# Patient Record
Sex: FEMALE | Race: WHITE | NOT HISPANIC OR LATINO | Employment: OTHER | URBAN - METROPOLITAN AREA
[De-identification: names, ages, dates, MRNs, and addresses within clinical notes are randomized per-mention and may not be internally consistent; named-entity substitution may affect disease eponyms.]

---

## 2024-08-04 ENCOUNTER — NURSE TRIAGE (OUTPATIENT)
Dept: ADMINISTRATIVE | Facility: CLINIC | Age: 79
End: 2024-08-04
Payer: MEDICARE

## 2024-08-04 ENCOUNTER — TELEPHONE (OUTPATIENT)
Dept: URGENT CARE | Facility: CLINIC | Age: 79
End: 2024-08-04
Payer: MEDICARE

## 2024-08-04 ENCOUNTER — OFFICE VISIT (OUTPATIENT)
Dept: URGENT CARE | Facility: CLINIC | Age: 79
End: 2024-08-04
Payer: MEDICARE

## 2024-08-04 ENCOUNTER — HOSPITAL ENCOUNTER (INPATIENT)
Facility: HOSPITAL | Age: 79
LOS: 2 days | Discharge: HOME | DRG: 177 | End: 2024-08-06
Attending: EMERGENCY MEDICINE | Admitting: INTERNAL MEDICINE
Payer: MEDICARE

## 2024-08-04 VITALS
OXYGEN SATURATION: 91 % | BODY MASS INDEX: 17.31 KG/M2 | DIASTOLIC BLOOD PRESSURE: 66 MMHG | HEART RATE: 93 BPM | WEIGHT: 116.88 LBS | SYSTOLIC BLOOD PRESSURE: 120 MMHG | HEIGHT: 69 IN | TEMPERATURE: 99 F | RESPIRATION RATE: 18 BRPM

## 2024-08-04 DIAGNOSIS — R07.9 CHEST PAIN: ICD-10-CM

## 2024-08-04 DIAGNOSIS — U07.1 COVID-19 VIRUS DETECTED: ICD-10-CM

## 2024-08-04 DIAGNOSIS — R79.89 ELEVATED TROPONIN: ICD-10-CM

## 2024-08-04 DIAGNOSIS — R79.89 ELEVATED TROPONIN I LEVEL: ICD-10-CM

## 2024-08-04 DIAGNOSIS — R09.02 HYPOXIA: ICD-10-CM

## 2024-08-04 DIAGNOSIS — R06.2 WHEEZING: ICD-10-CM

## 2024-08-04 DIAGNOSIS — J06.9 URI WITH COUGH AND CONGESTION: ICD-10-CM

## 2024-08-04 DIAGNOSIS — E11.9 DIABETES MELLITUS TYPE 2, NONINSULIN DEPENDENT: ICD-10-CM

## 2024-08-04 DIAGNOSIS — R93.89 ABNORMAL CT OF THE CHEST: ICD-10-CM

## 2024-08-04 DIAGNOSIS — I10 PRIMARY HYPERTENSION: ICD-10-CM

## 2024-08-04 DIAGNOSIS — U07.1 COVID-19: Primary | ICD-10-CM

## 2024-08-04 PROBLEM — E03.9 HYPOTHYROIDISM: Status: ACTIVE | Noted: 2024-08-04

## 2024-08-04 PROBLEM — E87.1 HYPONATREMIA: Status: ACTIVE | Noted: 2024-08-04

## 2024-08-04 PROBLEM — E78.5 HYPERLIPIDEMIA: Status: ACTIVE | Noted: 2024-08-04

## 2024-08-04 LAB
ALBUMIN SERPL BCP-MCNC: 3.5 G/DL (ref 3.5–5.2)
ALP SERPL-CCNC: 73 U/L (ref 55–135)
ALT SERPL W/O P-5'-P-CCNC: 32 U/L (ref 10–44)
ANION GAP SERPL CALC-SCNC: 12 MMOL/L (ref 8–16)
AST SERPL-CCNC: 37 U/L (ref 10–40)
BASOPHILS # BLD AUTO: 0.01 K/UL (ref 0–0.2)
BASOPHILS NFR BLD: 0.2 % (ref 0–1.9)
BILIRUB SERPL-MCNC: 1.2 MG/DL (ref 0.1–1)
BILIRUB UR QL STRIP: NEGATIVE
BNP SERPL-MCNC: 39 PG/ML (ref 0–99)
BUN SERPL-MCNC: 12 MG/DL (ref 8–23)
CALCIUM SERPL-MCNC: 8.5 MG/DL (ref 8.7–10.5)
CHLORIDE SERPL-SCNC: 93 MMOL/L (ref 95–110)
CLARITY UR: CLEAR
CO2 SERPL-SCNC: 21 MMOL/L (ref 23–29)
COLOR UR: COLORLESS
CREAT SERPL-MCNC: 0.8 MG/DL (ref 0.5–1.4)
CTP QC/QA: YES
DIFFERENTIAL METHOD BLD: ABNORMAL
EOSINOPHIL # BLD AUTO: 0 K/UL (ref 0–0.5)
EOSINOPHIL NFR BLD: 0.2 % (ref 0–8)
ERYTHROCYTE [DISTWIDTH] IN BLOOD BY AUTOMATED COUNT: 12.3 % (ref 11.5–14.5)
EST. GFR  (NO RACE VARIABLE): >60 ML/MIN/1.73 M^2
GLUCOSE SERPL-MCNC: 156 MG/DL (ref 70–110)
GLUCOSE UR QL STRIP: NEGATIVE
HCT VFR BLD AUTO: 38.5 % (ref 37–48.5)
HGB BLD-MCNC: 13.2 G/DL (ref 12–16)
HGB UR QL STRIP: NEGATIVE
IMM GRANULOCYTES # BLD AUTO: 0.04 K/UL (ref 0–0.04)
IMM GRANULOCYTES NFR BLD AUTO: 0.7 % (ref 0–0.5)
KETONES UR QL STRIP: ABNORMAL
LACTATE SERPL-SCNC: 0.9 MMOL/L (ref 0.5–2.2)
LACTATE SERPL-SCNC: 1 MMOL/L (ref 0.5–2.2)
LEUKOCYTE ESTERASE UR QL STRIP: NEGATIVE
LYMPHOCYTES # BLD AUTO: 0.1 K/UL (ref 1–4.8)
LYMPHOCYTES NFR BLD: 2.2 % (ref 18–48)
MCH RBC QN AUTO: 31.2 PG (ref 27–31)
MCHC RBC AUTO-ENTMCNC: 34.3 G/DL (ref 32–36)
MCV RBC AUTO: 91 FL (ref 82–98)
MONOCYTES # BLD AUTO: 0 K/UL (ref 0.3–1)
MONOCYTES NFR BLD: 0.4 % (ref 4–15)
NEUTROPHILS # BLD AUTO: 5.4 K/UL (ref 1.8–7.7)
NEUTROPHILS NFR BLD: 96.3 % (ref 38–73)
NITRITE UR QL STRIP: NEGATIVE
NRBC BLD-RTO: 0 /100 WBC
PH UR STRIP: 7 [PH] (ref 5–8)
PLATELET # BLD AUTO: 204 K/UL (ref 150–450)
PMV BLD AUTO: 9.8 FL (ref 9.2–12.9)
POTASSIUM SERPL-SCNC: 4.1 MMOL/L (ref 3.5–5.1)
PROT SERPL-MCNC: 6.9 G/DL (ref 6–8.4)
PROT UR QL STRIP: NEGATIVE
RBC # BLD AUTO: 4.23 M/UL (ref 4–5.4)
SARS-COV-2 AG RESP QL IA.RAPID: POSITIVE
SODIUM SERPL-SCNC: 126 MMOL/L (ref 136–145)
SP GR UR STRIP: 1.03 (ref 1–1.03)
TROPONIN I SERPL DL<=0.01 NG/ML-MCNC: 1.52 NG/ML (ref 0–0.03)
URN SPEC COLLECT METH UR: ABNORMAL
UROBILINOGEN UR STRIP-ACNC: NEGATIVE EU/DL
WBC # BLD AUTO: 5.55 K/UL (ref 3.9–12.7)

## 2024-08-04 PROCEDURE — 99214 OFFICE O/P EST MOD 30 MIN: CPT | Mod: ,,, | Performed by: NURSE PRACTITIONER

## 2024-08-04 PROCEDURE — 87040 BLOOD CULTURE FOR BACTERIA: CPT | Mod: 59 | Performed by: NURSE PRACTITIONER

## 2024-08-04 PROCEDURE — 87811 SARS-COV-2 COVID19 W/OPTIC: CPT | Mod: QW,,, | Performed by: NURSE PRACTITIONER

## 2024-08-04 PROCEDURE — 36415 COLL VENOUS BLD VENIPUNCTURE: CPT | Performed by: INTERNAL MEDICINE

## 2024-08-04 PROCEDURE — 21400001 HC TELEMETRY ROOM

## 2024-08-04 PROCEDURE — 81003 URINALYSIS AUTO W/O SCOPE: CPT | Performed by: NURSE PRACTITIONER

## 2024-08-04 PROCEDURE — 71046 X-RAY EXAM CHEST 2 VIEWS: CPT | Mod: S$GLB,,, | Performed by: RADIOLOGY

## 2024-08-04 PROCEDURE — 87449 NOS EACH ORGANISM AG IA: CPT | Performed by: INTERNAL MEDICINE

## 2024-08-04 PROCEDURE — 84145 PROCALCITONIN (PCT): CPT | Performed by: INTERNAL MEDICINE

## 2024-08-04 PROCEDURE — 80053 COMPREHEN METABOLIC PANEL: CPT | Performed by: NURSE PRACTITIONER

## 2024-08-04 PROCEDURE — 99285 EMERGENCY DEPT VISIT HI MDM: CPT | Mod: 25

## 2024-08-04 PROCEDURE — 93010 ELECTROCARDIOGRAM REPORT: CPT | Mod: ,,, | Performed by: INTERNAL MEDICINE

## 2024-08-04 PROCEDURE — 83605 ASSAY OF LACTIC ACID: CPT | Mod: 91 | Performed by: NURSE PRACTITIONER

## 2024-08-04 PROCEDURE — 63600175 PHARM REV CODE 636 W HCPCS: Performed by: INTERNAL MEDICINE

## 2024-08-04 PROCEDURE — 84484 ASSAY OF TROPONIN QUANT: CPT | Mod: 91 | Performed by: NURSE PRACTITIONER

## 2024-08-04 PROCEDURE — 83880 ASSAY OF NATRIURETIC PEPTIDE: CPT | Performed by: NURSE PRACTITIONER

## 2024-08-04 PROCEDURE — 93005 ELECTROCARDIOGRAM TRACING: CPT

## 2024-08-04 PROCEDURE — 27000207 HC ISOLATION

## 2024-08-04 PROCEDURE — 25000003 PHARM REV CODE 250: Performed by: INTERNAL MEDICINE

## 2024-08-04 PROCEDURE — 25500020 PHARM REV CODE 255: Performed by: EMERGENCY MEDICINE

## 2024-08-04 PROCEDURE — 85025 COMPLETE CBC W/AUTO DIFF WBC: CPT | Performed by: NURSE PRACTITIONER

## 2024-08-04 PROCEDURE — 84484 ASSAY OF TROPONIN QUANT: CPT | Performed by: INTERNAL MEDICINE

## 2024-08-04 RX ORDER — ASPIRIN 325 MG
325 TABLET ORAL ONCE
Status: COMPLETED | OUTPATIENT
Start: 2024-08-04 | End: 2024-08-04

## 2024-08-04 RX ORDER — ALBUTEROL SULFATE 90 UG/1
2 INHALANT RESPIRATORY (INHALATION) EVERY 6 HOURS PRN
Qty: 18 G | Refills: 1 | Status: ON HOLD | OUTPATIENT
Start: 2024-08-04 | End: 2024-08-06 | Stop reason: HOSPADM

## 2024-08-04 RX ORDER — NIRMATRELVIR AND RITONAVIR 300-100 MG
KIT ORAL
Qty: 30 TABLET | Refills: 0 | Status: ON HOLD | OUTPATIENT
Start: 2024-08-04 | End: 2024-08-06 | Stop reason: HOSPADM

## 2024-08-04 RX ORDER — PANTOPRAZOLE SODIUM 40 MG/1
40 TABLET, DELAYED RELEASE ORAL DAILY
Status: DISCONTINUED | OUTPATIENT
Start: 2024-08-05 | End: 2024-08-06 | Stop reason: HOSPADM

## 2024-08-04 RX ORDER — LEVOTHYROXINE SODIUM 125 UG/1
125 TABLET ORAL
Status: DISCONTINUED | OUTPATIENT
Start: 2024-08-05 | End: 2024-08-06 | Stop reason: HOSPADM

## 2024-08-04 RX ORDER — HYDRALAZINE HYDROCHLORIDE 20 MG/ML
10 INJECTION INTRAMUSCULAR; INTRAVENOUS EVERY 6 HOURS PRN
Status: DISCONTINUED | OUTPATIENT
Start: 2024-08-04 | End: 2024-08-06 | Stop reason: HOSPADM

## 2024-08-04 RX ORDER — ALBUTEROL SULFATE 90 UG/1
2 INHALANT RESPIRATORY (INHALATION) EVERY 6 HOURS PRN
Status: DISCONTINUED | OUTPATIENT
Start: 2024-08-04 | End: 2024-08-06 | Stop reason: HOSPADM

## 2024-08-04 RX ORDER — PRAVASTATIN SODIUM 20 MG/1
40 TABLET ORAL NIGHTLY
Status: DISCONTINUED | OUTPATIENT
Start: 2024-08-05 | End: 2024-08-06 | Stop reason: HOSPADM

## 2024-08-04 RX ORDER — ACETAMINOPHEN 325 MG/1
650 TABLET ORAL EVERY 6 HOURS PRN
Status: DISCONTINUED | OUTPATIENT
Start: 2024-08-04 | End: 2024-08-06 | Stop reason: HOSPADM

## 2024-08-04 RX ORDER — SODIUM CHLORIDE 9 MG/ML
INJECTION, SOLUTION INTRAVENOUS CONTINUOUS
Status: DISCONTINUED | OUTPATIENT
Start: 2024-08-04 | End: 2024-08-05

## 2024-08-04 RX ORDER — GLUCAGON 1 MG
1 KIT INJECTION
Status: DISCONTINUED | OUTPATIENT
Start: 2024-08-04 | End: 2024-08-06 | Stop reason: HOSPADM

## 2024-08-04 RX ORDER — ENOXAPARIN SODIUM 100 MG/ML
40 INJECTION SUBCUTANEOUS EVERY 12 HOURS
Status: DISCONTINUED | OUTPATIENT
Start: 2024-08-04 | End: 2024-08-06 | Stop reason: HOSPADM

## 2024-08-04 RX ORDER — IBUPROFEN 200 MG
16 TABLET ORAL
Status: DISCONTINUED | OUTPATIENT
Start: 2024-08-04 | End: 2024-08-06 | Stop reason: HOSPADM

## 2024-08-04 RX ORDER — INSULIN ASPART 100 [IU]/ML
0-5 INJECTION, SOLUTION INTRAVENOUS; SUBCUTANEOUS
Status: DISCONTINUED | OUTPATIENT
Start: 2024-08-04 | End: 2024-08-06 | Stop reason: HOSPADM

## 2024-08-04 RX ORDER — ONDANSETRON HYDROCHLORIDE 2 MG/ML
4 INJECTION, SOLUTION INTRAVENOUS EVERY 6 HOURS PRN
Status: DISCONTINUED | OUTPATIENT
Start: 2024-08-04 | End: 2024-08-06 | Stop reason: HOSPADM

## 2024-08-04 RX ORDER — SODIUM CHLORIDE 0.9 % (FLUSH) 0.9 %
10 SYRINGE (ML) INJECTION EVERY 12 HOURS PRN
Status: DISCONTINUED | OUTPATIENT
Start: 2024-08-04 | End: 2024-08-06 | Stop reason: HOSPADM

## 2024-08-04 RX ORDER — NALOXONE HCL 0.4 MG/ML
0.02 VIAL (ML) INJECTION
Status: DISCONTINUED | OUTPATIENT
Start: 2024-08-04 | End: 2024-08-06 | Stop reason: HOSPADM

## 2024-08-04 RX ORDER — IBUPROFEN 200 MG
24 TABLET ORAL
Status: DISCONTINUED | OUTPATIENT
Start: 2024-08-04 | End: 2024-08-06 | Stop reason: HOSPADM

## 2024-08-04 RX ADMIN — IOHEXOL 100 ML: 350 INJECTION, SOLUTION INTRAVENOUS at 08:08

## 2024-08-04 RX ADMIN — ASPIRIN 325 MG ORAL TABLET 325 MG: 325 PILL ORAL at 11:08

## 2024-08-04 RX ADMIN — DEXAMETHASONE 6 MG: 4 TABLET ORAL at 11:08

## 2024-08-04 RX ADMIN — ENOXAPARIN SODIUM 40 MG: 40 INJECTION SUBCUTANEOUS at 11:08

## 2024-08-04 RX ADMIN — SODIUM CHLORIDE: 9 INJECTION, SOLUTION INTRAVENOUS at 11:08

## 2024-08-04 NOTE — FIRST PROVIDER EVALUATION
"Medical screening examination initiated.  I have conducted a focused provider triage encounter, findings are as follows:    Brief history of present illness:  Patient presents to ER for COVID-19 concerns.  States she was evaluated at urgent care today diagnosed with COVID-19.  Was instructed to come in the ER due to low SpO2.    Vitals:    08/04/24 1830   BP: 122/62   BP Location: Left arm   Patient Position: Sitting   Pulse: 96   Resp: 20   Temp: 99.3 °F (37.4 °C)   SpO2: (!) 92%   Weight: 54 kg (119 lb 0.8 oz)   Height: 5' 8.9" (1.75 m)       Pertinent physical exam:  No acute distress.  SpO2 92% on room air.    Brief workup plan:  Workup. ED bed for further eval/treatment.      Preliminary workup initiated; this workup will be continued and followed by the physician or advanced practice provider that is assigned to the patient when roomed.  "

## 2024-08-04 NOTE — Clinical Note
Diagnosis: COVID-19 [[id number]]   Future Attending Provider: AUGUSTUS MADDOX [[id number]]   Reason for IP Medical Treatment  (Clinical interventions that can only be accomplished in the IP setting? ) :: Elevated troponin   I certify that Inpatient services for greater than or equal to 2 midnights are medically necessary:: No   Plans for Post-Acute care--if anticipated (pick the single best option):: A. No post acute care anticipated at this time   Special Needs:: No Special Needs [1]

## 2024-08-05 PROBLEM — J98.19 RIGHT MIDDLE LOBE SYNDROME: Status: ACTIVE | Noted: 2024-08-05

## 2024-08-05 PROBLEM — J47.9 ADULT BRONCHIECTASIS: Status: ACTIVE | Noted: 2024-08-05

## 2024-08-05 LAB
ALBUMIN SERPL BCP-MCNC: 3 G/DL (ref 3.5–5.2)
ALP SERPL-CCNC: 69 U/L (ref 55–135)
ALT SERPL W/O P-5'-P-CCNC: 36 U/L (ref 10–44)
ANION GAP SERPL CALC-SCNC: 11 MMOL/L (ref 8–16)
AORTIC ROOT ANNULUS: 2.62 CM
ASCENDING AORTA: 2.98 CM
AST SERPL-CCNC: 40 U/L (ref 10–40)
AV INDEX (PROSTH): 0.83
AV MEAN GRADIENT: 3 MMHG
AV PEAK GRADIENT: 6 MMHG
AV VALVE AREA BY VELOCITY RATIO: 2.55 CM²
AV VALVE AREA: 2.46 CM²
AV VELOCITY RATIO: 0.86
BASOPHILS # BLD AUTO: 0.01 K/UL (ref 0–0.2)
BASOPHILS NFR BLD: 0.4 % (ref 0–1.9)
BILIRUB SERPL-MCNC: 0.9 MG/DL (ref 0.1–1)
BSA FOR ECHO PROCEDURE: 1.6 M2
BUN SERPL-MCNC: 9 MG/DL (ref 8–23)
CALCIUM SERPL-MCNC: 8 MG/DL (ref 8.7–10.5)
CHLORIDE SERPL-SCNC: 94 MMOL/L (ref 95–110)
CO2 SERPL-SCNC: 22 MMOL/L (ref 23–29)
CREAT SERPL-MCNC: 0.8 MG/DL (ref 0.5–1.4)
CV ECHO LV RWT: 0.49 CM
DIFFERENTIAL METHOD BLD: ABNORMAL
DOP CALC AO PEAK VEL: 1.18 M/S
DOP CALC AO VTI: 26.3 CM
DOP CALC LVOT AREA: 3 CM2
DOP CALC LVOT DIAMETER: 1.94 CM
DOP CALC LVOT PEAK VEL: 1.02 M/S
DOP CALC LVOT STROKE VOLUME: 64.7 CM3
DOP CALC RVOT PEAK VEL: 0.52 M/S
DOP CALC RVOT VTI: 12.7 CM
DOP CALCLVOT PEAK VEL VTI: 21.9 CM
E WAVE DECELERATION TIME: 171.45 MSEC
E/A RATIO: 0.98
E/E' RATIO: 6.56 M/S
ECHO LV POSTERIOR WALL: 0.98 CM (ref 0.6–1.1)
EJECTION FRACTION: 55 %
EOSINOPHIL # BLD AUTO: 0 K/UL (ref 0–0.5)
EOSINOPHIL NFR BLD: 0 % (ref 0–8)
ERYTHROCYTE [DISTWIDTH] IN BLOOD BY AUTOMATED COUNT: 12.5 % (ref 11.5–14.5)
EST. GFR  (NO RACE VARIABLE): >60 ML/MIN/1.73 M^2
FRACTIONAL SHORTENING: 36 % (ref 28–44)
GLUCOSE SERPL-MCNC: 296 MG/DL (ref 70–110)
HCT VFR BLD AUTO: 36.4 % (ref 37–48.5)
HGB BLD-MCNC: 12.3 G/DL (ref 12–16)
IMM GRANULOCYTES # BLD AUTO: 0.01 K/UL (ref 0–0.04)
IMM GRANULOCYTES NFR BLD AUTO: 0.4 % (ref 0–0.5)
INTERVENTRICULAR SEPTUM: 0.87 CM (ref 0.6–1.1)
IVC DIAMETER: 1.57 CM
IVRT: 91.34 MSEC
LA MAJOR: 5.18 CM
LA MINOR: 2.83 CM
LA WIDTH: 2.5 CM
LEFT ATRIUM SIZE: 2.49 CM
LEFT ATRIUM VOLUME INDEX: 11.8 ML/M2
LEFT ATRIUM VOLUME: 19.37 CM3
LEFT INTERNAL DIMENSION IN SYSTOLE: 2.58 CM (ref 2.1–4)
LEFT VENTRICLE DIASTOLIC VOLUME INDEX: 43.19 ML/M2
LEFT VENTRICLE DIASTOLIC VOLUME: 70.83 ML
LEFT VENTRICLE MASS INDEX: 70 G/M2
LEFT VENTRICLE SYSTOLIC VOLUME INDEX: 14.7 ML/M2
LEFT VENTRICLE SYSTOLIC VOLUME: 24.06 ML
LEFT VENTRICULAR INTERNAL DIMENSION IN DIASTOLE: 4.02 CM (ref 3.5–6)
LEFT VENTRICULAR MASS: 114.83 G
LV LATERAL E/E' RATIO: 5.86 M/S
LV SEPTAL E/E' RATIO: 7.45 M/S
LVED V (TEICH): 70.83 ML
LVES V (TEICH): 24.06 ML
LVOT MG: 2.21 MMHG
LVOT MV: 0.7 CM/S
LYMPHOCYTES # BLD AUTO: 0.2 K/UL (ref 1–4.8)
LYMPHOCYTES NFR BLD: 5.9 % (ref 18–48)
MCH RBC QN AUTO: 31.4 PG (ref 27–31)
MCHC RBC AUTO-ENTMCNC: 33.8 G/DL (ref 32–36)
MCV RBC AUTO: 93 FL (ref 82–98)
MONOCYTES # BLD AUTO: 0.1 K/UL (ref 0.3–1)
MONOCYTES NFR BLD: 2.7 % (ref 4–15)
MV PEAK A VEL: 0.84 M/S
MV PEAK E VEL: 0.82 M/S
NEUTROPHILS # BLD AUTO: 2.3 K/UL (ref 1.8–7.7)
NEUTROPHILS NFR BLD: 90.6 % (ref 38–73)
NRBC BLD-RTO: 0 /100 WBC
OHS QRS DURATION: 88 MS
OHS QTC CALCULATION: 450 MS
PLATELET # BLD AUTO: 209 K/UL (ref 150–450)
PMV BLD AUTO: 10.1 FL (ref 9.2–12.9)
POCT GLUCOSE: 326 MG/DL (ref 70–110)
POTASSIUM SERPL-SCNC: 4.8 MMOL/L (ref 3.5–5.1)
PROCALCITONIN SERPL IA-MCNC: 14.21 NG/ML
PROT SERPL-MCNC: 5.6 G/DL (ref 6–8.4)
PV MEAN GRADIENT: 1 MMHG
RA MAJOR: 3.68 CM
RA PRESSURE ESTIMATED: 3 MMHG
RA WIDTH: 3.8 CM
RBC # BLD AUTO: 3.92 M/UL (ref 4–5.4)
SODIUM SERPL-SCNC: 127 MMOL/L (ref 136–145)
STJ: 2.78 CM
TDI LATERAL: 0.14 M/S
TDI SEPTAL: 0.11 M/S
TDI: 0.13 M/S
TRICUSPID ANNULAR PLANE SYSTOLIC EXCURSION: 2.04 CM
TROPONIN I SERPL DL<=0.01 NG/ML-MCNC: 0.73 NG/ML (ref 0–0.03)
TROPONIN I SERPL DL<=0.01 NG/ML-MCNC: 1.24 NG/ML (ref 0–0.03)
WBC # BLD AUTO: 2.56 K/UL (ref 3.9–12.7)
Z-SCORE OF LEFT VENTRICULAR DIMENSION IN END DIASTOLE: -1.38
Z-SCORE OF LEFT VENTRICULAR DIMENSION IN END SYSTOLE: -0.82

## 2024-08-05 PROCEDURE — 25000003 PHARM REV CODE 250: Performed by: INTERNAL MEDICINE

## 2024-08-05 PROCEDURE — 36415 COLL VENOUS BLD VENIPUNCTURE: CPT | Performed by: INTERNAL MEDICINE

## 2024-08-05 PROCEDURE — 99222 1ST HOSP IP/OBS MODERATE 55: CPT | Mod: ,,, | Performed by: INTERNAL MEDICINE

## 2024-08-05 PROCEDURE — 63600175 PHARM REV CODE 636 W HCPCS: Performed by: INTERNAL MEDICINE

## 2024-08-05 PROCEDURE — 86606 ASPERGILLUS ANTIBODY: CPT | Performed by: NURSE PRACTITIONER

## 2024-08-05 PROCEDURE — 27000207 HC ISOLATION

## 2024-08-05 PROCEDURE — 94799 UNLISTED PULMONARY SVC/PX: CPT | Mod: XB

## 2024-08-05 PROCEDURE — 85025 COMPLETE CBC W/AUTO DIFF WBC: CPT | Performed by: INTERNAL MEDICINE

## 2024-08-05 PROCEDURE — 21400001 HC TELEMETRY ROOM

## 2024-08-05 PROCEDURE — 99900035 HC TECH TIME PER 15 MIN (STAT)

## 2024-08-05 PROCEDURE — 80053 COMPREHEN METABOLIC PANEL: CPT | Performed by: INTERNAL MEDICINE

## 2024-08-05 PROCEDURE — 84484 ASSAY OF TROPONIN QUANT: CPT | Performed by: INTERNAL MEDICINE

## 2024-08-05 PROCEDURE — 63600175 PHARM REV CODE 636 W HCPCS: Performed by: EMERGENCY MEDICINE

## 2024-08-05 PROCEDURE — 25000003 PHARM REV CODE 250: Performed by: EMERGENCY MEDICINE

## 2024-08-05 PROCEDURE — 86698 HISTOPLASMA ANTIBODY: CPT | Performed by: NURSE PRACTITIONER

## 2024-08-05 PROCEDURE — 99223 1ST HOSP IP/OBS HIGH 75: CPT | Mod: ,,, | Performed by: INTERNAL MEDICINE

## 2024-08-05 PROCEDURE — 86612 BLASTOMYCES ANTIBODY: CPT | Performed by: NURSE PRACTITIONER

## 2024-08-05 PROCEDURE — 36415 COLL VENOUS BLD VENIPUNCTURE: CPT | Performed by: NURSE PRACTITIONER

## 2024-08-05 RX ORDER — FUROSEMIDE 10 MG/ML
20 INJECTION INTRAMUSCULAR; INTRAVENOUS DAILY
Status: DISCONTINUED | OUTPATIENT
Start: 2024-08-05 | End: 2024-08-06 | Stop reason: HOSPADM

## 2024-08-05 RX ORDER — CYANOCOBALAMIN 1000 UG/ML
1000 INJECTION, SOLUTION INTRAMUSCULAR; SUBCUTANEOUS DAILY
Status: COMPLETED | OUTPATIENT
Start: 2024-08-05 | End: 2024-08-06

## 2024-08-05 RX ORDER — THIAMINE HCL 100 MG
100 TABLET ORAL DAILY
Status: DISCONTINUED | OUTPATIENT
Start: 2024-08-05 | End: 2024-08-06 | Stop reason: HOSPADM

## 2024-08-05 RX ORDER — SODIUM CHLORIDE 9 MG/ML
INJECTION, SOLUTION INTRAVENOUS CONTINUOUS
Status: DISCONTINUED | OUTPATIENT
Start: 2024-08-05 | End: 2024-08-05

## 2024-08-05 RX ADMIN — SODIUM CHLORIDE: 9 INJECTION, SOLUTION INTRAVENOUS at 09:08

## 2024-08-05 RX ADMIN — AZITHROMYCIN MONOHYDRATE 500 MG: 500 INJECTION, POWDER, LYOPHILIZED, FOR SOLUTION INTRAVENOUS at 01:08

## 2024-08-05 RX ADMIN — Medication 100 MG: at 03:08

## 2024-08-05 RX ADMIN — Medication 1 TABLET: at 03:08

## 2024-08-05 RX ADMIN — INSULIN ASPART 2 UNITS: 100 INJECTION, SOLUTION INTRAVENOUS; SUBCUTANEOUS at 09:08

## 2024-08-05 RX ADMIN — LEVOTHYROXINE SODIUM 125 MCG: 125 TABLET ORAL at 05:08

## 2024-08-05 RX ADMIN — CYANOCOBALAMIN 1000 MCG: 1000 INJECTION INTRAMUSCULAR; SUBCUTANEOUS at 03:08

## 2024-08-05 RX ADMIN — CEFTRIAXONE SODIUM 2 G: 2 INJECTION, POWDER, FOR SOLUTION INTRAMUSCULAR; INTRAVENOUS at 02:08

## 2024-08-05 RX ADMIN — DEXAMETHASONE 6 MG: 4 TABLET ORAL at 10:08

## 2024-08-05 RX ADMIN — THERA TABS 1 TABLET: TAB at 03:08

## 2024-08-05 RX ADMIN — ENOXAPARIN SODIUM 40 MG: 40 INJECTION SUBCUTANEOUS at 09:08

## 2024-08-05 RX ADMIN — PANTOPRAZOLE SODIUM 40 MG: 40 TABLET, DELAYED RELEASE ORAL at 10:08

## 2024-08-05 RX ADMIN — ENOXAPARIN SODIUM 40 MG: 40 INJECTION SUBCUTANEOUS at 10:08

## 2024-08-05 RX ADMIN — FUROSEMIDE 20 MG: 10 INJECTION, SOLUTION INTRAMUSCULAR; INTRAVENOUS at 03:08

## 2024-08-05 NOTE — SUBJECTIVE & OBJECTIVE
Past Medical History:   Diagnosis Date    Diabetes mellitus     Hypercholesteremia     Hypertension     Hypothyroidism     Osteopenia     Endocrinologist treating       Past Surgical History:   Procedure Laterality Date    BREAST BIOPSY      BREAST CYST ASPIRATION      COLONOSCOPY  2016    BR NL    ENDOSCOPY         Review of patient's allergies indicates:   Allergen Reactions    Adhesive tape-silicones     Latex, natural rubber        Family History       Problem Relation (Age of Onset)    Colon cancer Father (86)    Hodgkin's lymphoma Father          Tobacco Use    Smoking status: Never    Smokeless tobacco: Never   Substance and Sexual Activity    Alcohol use: Never    Drug use: Never    Sexual activity: Not Currently     Partners: Male     Birth control/protection: None         Review of Systems   Constitutional:  Negative for fatigue.   Respiratory:  Negative for cough, shortness of breath and stridor.      Objective:     Vital Signs (Most Recent):  Temp: 98.3 °F (36.8 °C) (08/05/24 1144)  Pulse: 75 (08/05/24 1144)  Resp: 18 (08/05/24 1144)  BP: 121/71 (08/05/24 1144)  SpO2: 95 % (08/05/24 1144) Vital Signs (24h Range):  Temp:  [97.5 °F (36.4 °C)-99.3 °F (37.4 °C)] 98.3 °F (36.8 °C)  Pulse:  [66-96] 75  Resp:  [17-30] 18  SpO2:  [92 %-96 %] 95 %  BP: (108-146)/(62-72) 121/71     Weight: 52.6 kg (116 lb)  Body mass index is 17.13 kg/m².    No intake or output data in the 24 hours ending 08/05/24 1333     Physical Exam  Vitals and nursing note reviewed.   Constitutional:       Appearance: She is well-developed.   HENT:      Head: Normocephalic and atraumatic.      Nose: Nose normal.      Mouth/Throat:      Pharynx: No oropharyngeal exudate.   Eyes:      General: No scleral icterus.     Conjunctiva/sclera: Conjunctivae normal.      Pupils: Pupils are equal, round, and reactive to light.   Neck:      Thyroid: No thyromegaly.      Vascular: No JVD.      Trachea: No tracheal deviation.   Cardiovascular:      Rate  "and Rhythm: Normal rate and regular rhythm.      Heart sounds: Normal heart sounds, S1 normal and S2 normal. No murmur heard.  Pulmonary:      Effort: Pulmonary effort is normal. No tachypnea, accessory muscle usage or respiratory distress.      Breath sounds: Normal breath sounds. No stridor.   Abdominal:      General: Bowel sounds are normal. There is no distension.      Palpations: Abdomen is soft. There is no hepatomegaly, splenomegaly or mass.      Tenderness: There is no abdominal tenderness. There is no guarding or rebound.   Musculoskeletal:         General: No tenderness. Normal range of motion.      Cervical back: Normal range of motion and neck supple.   Lymphadenopathy:      Upper Body:      Right upper body: No supraclavicular adenopathy.      Left upper body: No supraclavicular adenopathy.   Skin:     General: Skin is warm and dry.      Findings: No rash.      Nails: There is no clubbing.   Neurological:      Mental Status: She is alert and oriented to person, place, and time.      Coordination: Coordination normal.      Gait: Gait normal.      Deep Tendon Reflexes: Reflexes are normal and symmetric.          Vents:       Lines/Drains/Airways       Peripheral Intravenous Line  Duration                  Peripheral IV - Single Lumen 08/04/24 1936 20 G Left Antecubital <1 day                    Significant Labs:    CBC/Anemia Profile:  Recent Labs   Lab 08/04/24  1934 08/05/24  0323   WBC 5.55 2.56*   HGB 13.2 12.3   HCT 38.5 36.4*    209   MCV 91 93   RDW 12.3 12.5        Chemistries:  Recent Labs   Lab 08/04/24 1934 08/05/24  0323   * 127*   K 4.1 4.8   CL 93* 94*   CO2 21* 22*   BUN 12 9   CREATININE 0.8 0.8   CALCIUM 8.5* 8.0*   ALBUMIN 3.5 3.0*   PROT 6.9 5.6*   BILITOT 1.2* 0.9   ALKPHOS 73 69   ALT 32 36   AST 37 40       Cardiac Markers: No results for input(s): "CKMB", "TROPONINT", "MYOGLOBIN" in the last 48 hours.  Respiratory Culture: No results for input(s): "GSRESP", " ""RESPIRATORYC" in the last 48 hours.  Troponin:   Recent Labs   Lab 08/04/24  1934 08/04/24  2330 08/05/24  0323   TROPONINI 1.523* 1.238* 0.731*     All pertinent labs within the past 24 hours have been reviewed.    Significant Imaging:   I have reviewed all pertinent imaging results/findings within the past 24 hours.    Echo    Left Ventricle: The left ventricle is normal in size. Normal wall   thickness. There is concentric remodeling. There is normal systolic   function. Ejection fraction by visual approximation is 55%. There is   normal diastolic function.    Right Ventricle: Normal right ventricular cavity size. Wall thickness   is normal. Systolic function is normal.    IVC/SVC: Normal venous pressure at 3 mmHg.       EXAMINATION:  CTA CHEST NON CORONARY (PE STUDIES)     CLINICAL HISTORY:  Pulmonary embolism (PE) suspected, high prob;     TECHNIQUE:  Low dose axial images, sagittal and coronal reformations were obtained from the thoracic inlet to the lung bases following the IV administration of 100 mL of Omnipaque 350.  Contrast timing was optimized to evaluate the pulmonary arteries.  MIP images were performed.     COMPARISON:  None     FINDINGS:  No evidence for pulmonary embolism.  No evidence for aortic dissection or aneurysm.  Cardiovascular structures have a normal non gated appearance.  No acute osseous injury     Tree in bud opacities consistent with endobronchial infection. 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     Mild motion artifacts.  Subpleural nodular opacities likely related to infectious process.     Impression:     No pulmonary embolism.  No dissection.     Moderate tree in bud opacities consistent with endobronchial infection.  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  Correlate clinically to chronic DORI infection.     All CT scans   are performed using dose optimization techniques including the following: automated exposure control; adjustment of the mA and/or " kV; use of iterative reconstruction technique.  Dose modulation was employed for ALARA by means of: Automated exposure control; adjustment of the mA and/or kV according to patient size (this includes techniques or standardized protocols for targeted exams where dose is matched to indication/reason for exam; i.e. extremities or head); and/or use of iterative reconstructive technique.     Component      Latest Ref Rng 8/4/2024   Sodium      136 - 145 mmol/L 126 (L)    Potassium      3.5 - 5.1 mmol/L 4.1    Chloride      95 - 110 mmol/L 93 (L)    CO2      23 - 29 mmol/L 21 (L)    Glucose      70 - 110 mg/dL 156 (H)    BUN      8 - 23 mg/dL 12    Creatinine      0.5 - 1.4 mg/dL 0.8    Calcium      8.7 - 10.5 mg/dL 8.5 (L)    PROTEIN TOTAL      6.0 - 8.4 g/dL 6.9    Albumin      3.5 - 5.2 g/dL 3.5    BILIRUBIN TOTAL      0.1 - 1.0 mg/dL 1.2 (H)    ALP      55 - 135 U/L 73    AST      10 - 40 U/L 37    ALT      10 - 44 U/L 32    eGFR      >60 mL/min/1.73 m^2 >60    Anion Gap      8 - 16 mmol/L 12    Lactic Acid Level      0.5 - 2.2 mmol/L 1.0    BNP      0 - 99 pg/mL 39    Troponin I      0.000 - 0.026 ng/mL 1.238 (H)    Procalcitonin      <0.25 ng/mL 14.21 (H)       Legend:  (L) Low  (H) High

## 2024-08-05 NOTE — H&P
[facility] Medicine  History & Physical    Patient Name: Nehal Jimenez  MRN: [id number]  Patient Class: IP- Inpatient  Admission Date: 8/4/2024  Attending Physician:  Irene Rutledge MD  Primary Care Provider: Cory Gibbons MD         Patient information was obtained from patient, relative(s), ER records, and ER physician .     Subjective:     Principal Problem:COVID-19    Chief Complaint:   Chief Complaint   Patient presents with    COVID-19 Concerns     [facility] sent pt dx with covid today with abnormal chest xr results, denies sob, c/o cough        HPI: 79-year-old white woman with history of non-insulin-dependent diabetes mellitus type 2, hypertension, hyperlipidemia, hypothyroidism, urinary incontinence, and osteoporosis who originally went to urgent care today with complaint of rhinorrhea and cough and was diagnosed with COVID.  She had an abnormal chest x-ray with right middle lobe lung nodule and O2 saturations of 90-91% on room air and was sent to the emergency department.  Patient reports she has had upper respiratory infection symptoms over the last several days, constant, mild to moderate severity.  She has had no associated chest pain, shortness for breath, palpitations, nausea, vomiting, fevers, chills, diarrhea, constipation, or abdominal pain.  Her cough is nonproductive.  Abnormal labs in our emergency department include sodium 126, glucose 156, and troponin 1.523.  Patient denies any prior cardiac history.    Past Medical History:   Diagnosis Date    Diabetes mellitus     Hypercholesteremia     Hypertension     Hypothyroidism     Osteopenia     Endocrinologist treating       Past Surgical History:   Procedure Laterality Date    BREAST BIOPSY      BREAST CYST ASPIRATION      COLONOSCOPY  2016    [facility]    ENDOSCOPY         Review of patient's allergies indicates:   Allergen Reactions    Adhesive tape-silicones     Latex, natural rubber        No current facility-administered  medications on file prior to encounter.     Current Outpatient Medications on File Prior to Encounter   Medication Sig    albuterol (VENTOLIN HFA) 90 mcg/actuation inhaler Inhale 2 puffs into the lungs every 6 (six) hours as needed for Wheezing. Inhaler    calcium carbonate (CALCIUM 300 ORAL) Take by mouth.    calcium citrate-vitamin D3 315-200 mg (CITRACAL+D) 315 mg-5 mcg (200 unit) per tablet Take 1 tablet by mouth 2 (two) times daily.    ergocalciferol, vitamin D2, (VITAMIN D ORAL) Take by mouth.    levothyroxine (SYNTHROID) 125 MCG tablet Take by mouth.    losartan (COZAAR) 25 MG tablet losartan 25 mg tablet   TAKE 1 TABLET BY MOUTH DAILY    metFORMIN (GLUCOPHAGE-XR) 500 MG ER 24hr tablet Take 500 mg by mouth once daily.    moxifloxacin (VIGAMOX) 0.5 % ophthalmic solution Place 1 drop into the left eye 4 (four) times daily.    nirmatrelvir-ritonavir (PAXLOVID) 300 mg (150 mg x 2)-100 mg copackaged tablets (EUA) Take 3 tablets by mouth 2 (two) times daily. Each dose contains 2 nirmatrelvir (pink tablets) and 1 ritonavir (white tablet). Take all 3 tablets together  HOLD STATIN/CHOLESTERAL MED X 7 DAYS    pravastatin (PRAVACHOL) 40 MG tablet Take 1 tablet by mouth once daily.    azithromycin (Z-STIVEN) 250 MG tablet azithromycin 250 mg tablet (Patient not taking: Reported on 8/4/2024)    denosumab (PROLIA) 60 mg/mL Syrg Inject 60 mg into the skin.    lancets 33 gauge Misc USE EVERY DAY    levocetirizine (XYZAL) 5 MG tablet Take 5 mg by mouth every evening. (Patient not taking: Reported on 8/4/2024)    lisinopriL (PRINIVIL,ZESTRIL) 2.5 MG tablet Take 1 tablet by mouth once daily. (Patient not taking: Reported on 8/4/2024)    meclizine (ANTIVERT) 25 MG tablet meclizine 25 mg chewable tablet   CHEW AND SWALLOW 1 TABLET BY MOUTH THREE TIMES DAILY AS NEEDED (Patient not taking: Reported on 8/4/2024)    mirabegron (MYRBETRIQ) 50 mg Tb24 Take 1 tablet by mouth once daily. (Patient not taking: Reported on 8/4/2024)     mometasone (NASONEX) 50 mcg/actuation nasal spray by Nasal route. (Patient not taking: Reported on 8/4/2024)    ONETOUCH ULTRA TEST Strp TEST BLOOD SUGAR ONE TIME DAILY    pantoprazole (PROTONIX) 40 MG tablet pantoprazole 40 mg tablet,delayed release   TAKE 1 TABLET BY MOUTH EVERY MORNING (Patient not taking: Reported on 8/4/2024)    pimecrolimus (ELIDEL) 1 % cream 2 (two) times daily. (Patient not taking: Reported on 8/4/2024)    predniSONE (DELTASONE) 5 MG tablet prednisone 5 mg tablet   TAKE 1 TABLET BY MOUTH EVERY DAY IN THE MORNING (Patient not taking: Reported on 8/4/2024)     Family History       Problem Relation (Age of Onset)    Colon cancer Father (86)    Hodgkin's lymphoma Father          Tobacco Use    Smoking status: Never    Smokeless tobacco: Never   Substance and Sexual Activity    Alcohol use: Never    Drug use: Never    Sexual activity: Not Currently     Partners: Male     Birth control/protection: None     Review of Systems   Constitutional:  Negative for chills and fever.   HENT:  Positive for rhinorrhea.    Respiratory:  Positive for cough. Negative for shortness of breath.    Cardiovascular:  Negative for chest pain, palpitations and leg swelling.   Gastrointestinal:  Negative for abdominal pain, constipation, diarrhea, nausea and vomiting.   All other systems reviewed and are negative.    Objective:     Vital Signs (Most Recent):  Temp: 98.9 °F (37.2 °C) (08/04/24 2202)  Pulse: 85 (08/04/24 2202)  Resp: (!) 22 (08/04/24 2202)  BP: 134/71 (08/04/24 2202)  SpO2: 95 % (08/04/24 2202) Vital Signs (24h Range):  Temp:  [98.8 °F (37.1 °C)-99.3 °F (37.4 °C)] 98.9 °F (37.2 °C)  Pulse:  [85-96] 85  Resp:  [17-30] 22  SpO2:  [91 %-96 %] 95 %  BP: (120-146)/(62-71) 134/71     Weight: 54 kg (119 lb 0.8 oz)  Body mass index is 17.63 kg/m².     Physical Exam  Vitals reviewed.   Constitutional:       General: She is not in acute distress.     Appearance: She is not ill-appearing or diaphoretic.   HENT:       Nose: Nose normal.   Eyes:      Conjunctiva/sclera: Conjunctivae normal.   Cardiovascular:      Rate and Rhythm: Normal rate.   Pulmonary:      Effort: Pulmonary effort is normal. No respiratory distress.      Breath sounds: No wheezing.   Abdominal:      General: There is no distension.      Tenderness: There is no abdominal tenderness.   Musculoskeletal:         General: No swelling.   Skin:     General: Skin is warm and dry.   Neurological:      Mental Status: She is alert and oriented to person, place, and time.   Psychiatric:         Mood and Affect: Mood normal.         Behavior: Behavior normal.                Significant Labs: All pertinent labs within the past 24 hours have been reviewed.  Recent Lab Results         08/04/24  2117 08/04/24  1934   08/04/24  1001        Albumin   3.5         ALP   73         ALT   32         Anion Gap   12         Appearance, UA Clear           AST   37         Baso #   0.01         Basophil %   0.2         Bilirubin (UA) Negative           BILIRUBIN TOTAL   1.2  Comment: For infants and newborns, interpretation of results should be based  on gestational age, weight and in agreement with clinical  observations.    Premature Infant recommended reference ranges:  Up to 24 hours.............<8.0 mg/dL  Up to 48 hours............<12.0 mg/dL  3-5 days..................<15.0 mg/dL  6-29 days.................<15.0 mg/dL           BNP   39  Comment: Values of less than 100 pg/ml are consistent with non-CHF populations.         BUN   12         Calcium   8.5         Chloride   93         CO2   21         Color, UA Colorless           Creatinine   0.8         Differential Method   Automated         eGFR   >60         Eos #   0.0         Eos %   0.2         Glucose   156         Glucose, UA Negative           Gran # (ANC)   5.4         Gran %   96.3         Hematocrit   38.5         Hemoglobin   13.2         Immature Grans (Abs)   0.04  Comment: Mild elevation in immature  granulocytes is non specific and   can be seen in a variety of conditions including stress response,   acute inflammation, trauma and pregnancy. Correlation with other   laboratory and clinical findings is essential.           Immature Granulocytes   0.7         Ketones, UA 1+           Lactic Acid Level   1.0  Comment: Falsely low lactic acid results can be found in samples   containing >=13.0 mg/dL total bilirubin and/or >=3.5 mg/dL   direct bilirubin.           Leukocyte Esterase, UA Negative           Lymph #   0.1         Lymph %   2.2         MCH   31.2         MCHC   34.3         MCV   91         Mono #   0.0         Mono %   0.4         MPV   9.8         NITRITE UA Negative           nRBC   0         Blood, UA Negative           pH, UA 7.0           Platelet Count   204         Potassium   4.1         PROTEIN TOTAL   6.9         Protein, UA Negative  Comment: Recommend a 24 hour urine protein or a urine   protein/creatinine ratio if globulin induced proteinuria is  clinically suspected.              Acceptable     Yes       RBC   4.23         RDW   12.3         SARS Coronavirus 2 Antigen     Positive       Sodium   126         Spec Grav UA 1.030           Specimen UA Urine, Clean Catch           Troponin I   1.523  Comment: The reference interval for Troponin I represents the 99th percentile   cutoff   for our facility and is consistent with 3rd generation assay   performance.           UROBILINOGEN UA Negative           WBC   5.55                 Significant Imaging: I have reviewed all pertinent imaging results/findings within the past 24 hours.  CTA Chest Non-Coronary (PE Studies)   Final Result      No pulmonary embolism.  No dissection.      Moderate tree in bud opacities consistent with endobronchial infection.  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  Correlate clinically to chronic ODRI infection.      All CT scans   are performed using dose optimization techniques  "including the following: automated exposure control; adjustment of the mA and/or kV; use of iterative reconstruction technique.  Dose modulation was employed for ALARA by means of: Automated exposure control; adjustment of the mA and/or kV according to patient size (this includes techniques or standardized protocols for targeted exams where dose is matched to indication/reason for exam; i.e. extremities or head); and/or use of iterative reconstructive technique.         Electronically signed by: Lee Marrero   Date:    08/04/2024   Time:    21:08      X-Ray Chest AP Portable   Final Result      No acute abnormality.  Chronic changes similar to prior exam.  Stable exam         Electronically signed by: Lee Marrero   Date:    08/04/2024   Time:    19:57         Assessment/Plan:     * COVID-19  Patient is identified as Severe COVID-19 based on hypoxemia with O2 saturations <94% on room air or on ambulation  Initiate standard COVID protocols; COVID-19 testing ,Infection Control notification  and isolation- respiratory, contact and droplet per protocol    Diagnostics: CBC, CMP, Procalcitonin, BNP, and Portable CXR  -white blood cell count 5.55, lactic acid level 1.0, COVID-19 positive, BNP 39, troponin 1.523, urinalysis negative, blood cultures pending, and currently afebrile  -CTA of chest with No pulmonary embolism.  No dissection. Moderate tree in bud opacities consistent with endobronchial infection.  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  Correlate clinically to chronic DORI infection."     Management: Initiate targeted therapy with Dexamethasone PO/IV 6mg daily x10 days, Maintain oxygen saturations 92-96% via Nasal Cannula  LPM and monitor with continuous pulse oximetry. , Inhaled bronchodilators as needed for shortness of breath., and Continuous cardiac monitoring.  -Decadron 6 mg p.o. daily added as patient with relative hypoxia 90-91% on room air upon ER arrival  -empiric IV Rocephin and IV " azithromycin given abnormal CTA of chest  -check procalcitonin level  -check sputum culture, AFB culture, and fungal culture  -check Fungitell assay  -pulmonary consult for a.m. in the setting of abnormal CTA of chest    Elevated troponin  Elevated troponin most likely related to COVID-19 infection and underlying pulmonary process; however, patient does have multiple risk factors for cardiovascular disease and we will evaluate further  -patient denies chest pain, palpitations, and shortness a breath  -troponin 1.5-3, BNP 39  -EKG with normal sinus rhythm at 93 with nonspecific T-wave abnormalities  -CTA of chest reviewed as above with no evidence of PE  -recent labs 04/17/2024 with total cholesterol 190, triglycerides 46, HDL 89, LDL 92, TSH 0.78, free T4 1.59, and A1c 6.8  -give aspirin 325 mg p.o. x1 dose  -check serial cardiac enzymes  -check echocardiogram  -telemetry monitoring  -NPO at midnight in case cardiac evaluation/intervention pursued  -cardiology consult for a.m.      Hyponatremia  Hyponatremia is likely due to unclear etiology. The patient's most recent sodium results are listed below.  Recent Labs     08/04/24  1934   *     Plan  - Correct the sodium by 4-6mEq in 24 hours.   - additional studies not indicated at this time--> recent thyroid studies were within normal range 04/17/2024.  Patient with chronic mild hyponatremia on review of past labs; sodium 139 on 04/17/2024 and 134 on 10/12/2023  - Will treat the hyponatremia with IV fluids as follows:  Normal saline at 100 mL/hr times 10 hours  - Monitor sodium Daily.   - Patient hyponatremia is  pending reassessment  -patient is on no home diuretics and denies vomiting or diarrhea    Diabetes mellitus type 2, noninsulin dependent  Patient's FSGs are controlled on current medication regimen.  Last A1c reviewed-   Lab Results   Component Value Date    HGBA1C 6.8 (H) 04/17/2024     Most recent fingerstick glucose reviewed- No results for input(s):  ""POCTGLUCOSE" in the last 24 hours.  Current correctional scale  Low  Maintain anti-hyperglycemic dose as follows-   Antihyperglycemics (From admission, onward)      Start     Stop Route Frequency Ordered    08/04/24 2335  insulin aspart U-100 pen 0-5 Units         -- SubQ Before meals & nightly PRN 08/04/24 2236          Hold Oral hypoglycemics while patient is in the hospital.  Hold Glucophage.    Hypothyroidism  -continue Synthroid  -recent labs 04/17/2024 with TSH 0.78 and free T4 1.59      Hyperlipidemia  -continue Pravachol  -recent lipids 04/17/2024 with total cholesterol 190, triglycerides 46, HDL 89, and LDL 92      Primary hypertension  Chronic, controlled. Latest blood pressure and vitals reviewed-     Temp:  [98.8 °F (37.1 °C)-99.3 °F (37.4 °C)]   Pulse:  [85-96]   Resp:  [17-30]   BP: (120-146)/(62-71)   SpO2:  [91 %-96 %] .   Home meds for hypertension were reviewed and noted below.   Hypertension Medications               lisinopriL (PRINIVIL,ZESTRIL) 2.5 MG tablet Take 1 tablet by mouth once daily.    losartan (COZAAR) 25 MG tablet losartan 25 mg tablet   TAKE 1 TABLET BY MOUTH DAILY            While in the hospital, will manage blood pressure as follows; Adjust home antihypertensive regimen as follows- hold lisinopril and Cozaar.  P.r.n. IV hydralazine with parameters.    Will utilize p.r.n. blood pressure medication only if patient's blood pressure greater than 160/100 and she develops symptoms such as worsening chest pain or shortness of breath.      VTE Risk Mitigation (From admission, onward)           Ordered     enoxaparin injection 40 mg  Every 12 hours         08/04/24 2236     IP VTE HIGH RISK PATIENT  Once         08/04/24 2236     Place sequential compression device  Until discontinued         08/04/24 2236                                    Irene Rutledge MD  Department of Hospital Medicine  [facility] Tele[facility] ([facility])          "

## 2024-08-05 NOTE — PLAN OF CARE
[facility] - Tele[facility] (Hospital)  Initial Discharge Assessment       Primary Care Provider: Cory Gibbons MD    Admission Diagnosis: Abnormal CT of the chest [R93.89]  Elevated troponin [R79.89]  Elevated troponin I level [R79.89]  Chest pain [R07.9]  COVID-19 [U07.1]    Admission Date: 8/4/2024  Expected Discharge Date: Per Attending    Transition of Care Barriers: None    Payor: MEDICARE / Plan: MEDICARE PART A & B / Product Type: Government /     Extended Emergency Contact Information  Primary Emergency Contact: nida jimenez  Mobile Phone: [phone]  Relation: Spouse  Preferred language: English   needed? No    Discharge Plan A: Home with family         [facility] [employer] [id number] [place] [place] [facility] [address] [facility] [address]  [address]  [facility] [address]  Phone: [phone] Fax: [phone]      Initial Assessment (most recent)       Adult Discharge Assessment - 08/05/24 0915          Discharge Assessment    Assessment Type Discharge Planning Assessment     Confirmed/corrected address, phone number and insurance Yes     Confirmed Demographics Correct on Facesheet     Source of Information family     When was your last doctors appointment? 08/04/24   Mehul Pritchett NP - [facility]    Communicated RUMA with patient/caregiver Date not available/Unable to determine     Reason For Admission COVID-19     People in Home spouse     Facility Arrived From: home     Do you expect to return to your current living situation? Yes     Do you have help at home or someone to help you manage your care at home? Yes     Who are your caregiver(s) and their phone number(s)? Nida Jimenez - spouse     Prior to hospitilization cognitive status: Alert/Oriented     Current cognitive status: Alert/Oriented     Walking or Climbing Stairs Difficulty no     Dressing/Bathing Difficulty no     Home Accessibility wheelchair accessible     Equipment Currently Used  at Home none     Readmission within 30 days? No     Patient currently being followed by outpatient case management? No     Do you currently have service(s) that help you manage your care at home? No     Do you take prescription medications? Yes     Do you have prescription coverage? Yes     Coverage Medicare A & B     Do you have any problems affording any of your prescribed medications? No     Is the patient taking medications as prescribed? yes     Who is going to help you get home at discharge? Bran Jimenez - spouse and 2 adult daughters     How do you get to doctors appointments? car, drives self     Are you on dialysis? No     Do you take coumadin? No     Discharge Plan A Home with family     DME Needed Upon Discharge  none     Discharge Plan discussed with: Adult children     Transition of Care Barriers None        Transportation Needs    Has the lack of transportation kept you from medical appointments, meetings, work or from getting things needed for daily living? No                   Anticipated DC dispo: home with family  Prior Level of Function: independent with ADLs  People in home: Bran Jimenez - spouse     Comments:  SW spoke to Patient's daughter, Lay, over the phone, due to isolations,  to introduce role and discuss discharge planning. Patient's spouse will be help at home and can provide transport at time of discharge. Confirmed demographics, insurance, and emergency contacts. SW updated Patient's whiteboard with contact information and anticipated DC plan. CM discharge needs depends on hospital progress. JAKOB will continue following to assist with other needs.

## 2024-08-05 NOTE — HPI
79-year-old white woman with history of non-insulin-dependent diabetes mellitus type 2, hypertension, hyperlipidemia, hypothyroidism, urinary incontinence, and osteoporosis who originally went to urgent care today with complaint of rhinorrhea and cough and was diagnosed with COVID.  She had an abnormal chest x-ray with right middle lobe lung nodule and O2 saturations of 90-91% on room air and was sent to the emergency department.  Patient reports she has had upper respiratory infection symptoms over the last several days, constant, mild to moderate severity.  She has had no associated chest pain, shortness for breath, palpitations, nausea, vomiting, fevers, chills, diarrhea, constipation, or abdominal pain.  Her cough is nonproductive.  Abnormal labs in our emergency department include sodium 126, glucose 156, and troponin 1.523.  Patient denies any prior cardiac history.

## 2024-08-05 NOTE — CONSULTS
[facility] Tele[facility] ([facility])  Cardiology  Consult Note    Patient Name: Nehal Jimenez  MRN: [id number]  Admission Date: 8/4/2024  Hospital Length of Stay: 1 days  Code Status: Full Code   Attending Provider: Meagan Hernandez MD   Consulting Provider: Jose Miller MD  Primary Care Physician: Cory Gibbons MD  Principal Problem:COVID-19    Patient information was obtained from patient and ER records.     Inpatient consult to Cardiology  Consult performed by: Jose Miller MD  Consult ordered by: Irene Rutledge MD        Subjective:     Chief Complaint:  troponin     HPI:   79-year-old white woman with history of non-insulin-dependent diabetes mellitus type 2, hypertension, hyperlipidemia, hypothyroidism, admitted for covid pneumonia and hypoxemia.  Pt also with mild increase in troponin peak 1.5 .Patient denies CP, angina or anginal equivalent.Pt active at home before this episode, aerobics 3x week. EKG is normal.        Past Medical History:   Diagnosis Date    Diabetes mellitus     Hypercholesteremia     Hypertension     Hypothyroidism     Osteopenia     Endocrinologist treating       Past Surgical History:   Procedure Laterality Date    BREAST BIOPSY      BREAST CYST ASPIRATION      COLONOSCOPY  2016    [facility]    ENDOSCOPY         Review of patient's allergies indicates:   Allergen Reactions    Adhesive tape-silicones     Latex, natural rubber        No current facility-administered medications on file prior to encounter.     Current Outpatient Medications on File Prior to Encounter   Medication Sig    albuterol (VENTOLIN HFA) 90 mcg/actuation inhaler Inhale 2 puffs into the lungs every 6 (six) hours as needed for Wheezing. Inhaler    calcium carbonate (CALCIUM 300 ORAL) Take by mouth.    calcium citrate-vitamin D3 315-200 mg (CITRACAL+D) 315 mg-5 mcg (200 unit) per tablet Take 1 tablet by mouth 2 (two) times daily.    ergocalciferol, vitamin D2, (VITAMIN D ORAL) Take by mouth.    levothyroxine  (SYNTHROID) 125 MCG tablet Take by mouth.    losartan (COZAAR) 25 MG tablet losartan 25 mg tablet   TAKE 1 TABLET BY MOUTH DAILY    metFORMIN (GLUCOPHAGE-XR) 500 MG ER 24hr tablet Take 500 mg by mouth once daily.    moxifloxacin (VIGAMOX) 0.5 % ophthalmic solution Place 1 drop into the left eye 4 (four) times daily.    nirmatrelvir-ritonavir (PAXLOVID) 300 mg (150 mg x 2)-100 mg copackaged tablets (EUA) Take 3 tablets by mouth 2 (two) times daily. Each dose contains 2 nirmatrelvir (pink tablets) and 1 ritonavir (white tablet). Take all 3 tablets together  HOLD STATIN/CHOLESTERAL MED X 7 DAYS    pravastatin (PRAVACHOL) 40 MG tablet Take 1 tablet by mouth once daily.    azithromycin (Z-STIVEN) 250 MG tablet azithromycin 250 mg tablet (Patient not taking: Reported on 8/4/2024)    denosumab (PROLIA) 60 mg/mL Syrg Inject 60 mg into the skin.    lancets 33 gauge Misc USE EVERY DAY    levocetirizine (XYZAL) 5 MG tablet Take 5 mg by mouth every evening. (Patient not taking: Reported on 8/4/2024)    lisinopriL (PRINIVIL,ZESTRIL) 2.5 MG tablet Take 1 tablet by mouth once daily. (Patient not taking: Reported on 8/4/2024)    meclizine (ANTIVERT) 25 MG tablet meclizine 25 mg chewable tablet   CHEW AND SWALLOW 1 TABLET BY MOUTH THREE TIMES DAILY AS NEEDED (Patient not taking: Reported on 8/4/2024)    mirabegron (MYRBETRIQ) 50 mg Tb24 Take 1 tablet by mouth once daily. (Patient not taking: Reported on 8/4/2024)    mometasone (NASONEX) 50 mcg/actuation nasal spray by Nasal route. (Patient not taking: Reported on 8/4/2024)    ONETOUCH ULTRA TEST Strp TEST BLOOD SUGAR ONE TIME DAILY    pantoprazole (PROTONIX) 40 MG tablet pantoprazole 40 mg tablet,delayed release   TAKE 1 TABLET BY MOUTH EVERY MORNING (Patient not taking: Reported on 8/4/2024)    pimecrolimus (ELIDEL) 1 % cream 2 (two) times daily. (Patient not taking: Reported on 8/4/2024)    predniSONE (DELTASONE) 5 MG tablet prednisone 5 mg tablet   TAKE 1 TABLET BY MOUTH EVERY DAY  IN THE MORNING (Patient not taking: Reported on 8/4/2024)     Family History       Problem Relation (Age of Onset)    Colon cancer Father (86)    Hodgkin's lymphoma Father          Tobacco Use    Smoking status: Never    Smokeless tobacco: Never   Substance and Sexual Activity    Alcohol use: Never    Drug use: Never    Sexual activity: Not Currently     Partners: Male     Birth control/protection: None     Review of Systems   Constitutional: Negative. Negative for weight gain.   HENT: Negative.     Eyes: Negative.    Cardiovascular: Negative.  Negative for chest pain, leg swelling and palpitations.   Respiratory: Negative.  Negative for shortness of breath.    Endocrine: Negative.    Hematologic/Lymphatic: Negative.    Skin: Negative.    Musculoskeletal:  Negative for muscle weakness.   Gastrointestinal: Negative.    Genitourinary: Negative.    Neurological: Negative.  Negative for dizziness.   Psychiatric/Behavioral: Negative.     Allergic/Immunologic: Negative.    All other systems reviewed and are negative.    Objective:     Vital Signs (Most Recent):  Temp: 97.5 °F (36.4 °C) (08/05/24 0805)  Pulse: 75 (08/05/24 0812)  Resp: 18 (08/05/24 0805)  BP: 126/72 (08/05/24 0805)  SpO2: (!) 94 % (08/05/24 0805) Vital Signs (24h Range):  Temp:  [97.5 °F (36.4 °C)-99.3 °F (37.4 °C)] 97.5 °F (36.4 °C)  Pulse:  [66-96] 75  Resp:  [17-30] 18  SpO2:  [91 %-96 %] 94 %  BP: (108-146)/(62-72) 126/72     Weight: 52.6 kg (116 lb)  Body mass index is 17.13 kg/m².    SpO2: (!) 94 %       No intake or output data in the 24 hours ending 08/05/24 0950    Lines/Drains/Airways       Peripheral Intravenous Line  Duration                  Peripheral IV - Single Lumen 08/04/24 1936 20 G Left Antecubital <1 day                     Physical Exam  Vitals and nursing note reviewed.   Constitutional:       Appearance: She is well-developed.   HENT:      Head: Normocephalic and atraumatic.   Eyes:      Conjunctiva/sclera: Conjunctivae normal.       Pupils: Pupils are equal, round, and reactive to light.   Cardiovascular:      Rate and Rhythm: Normal rate and regular rhythm.      Pulses: Intact distal pulses.      Heart sounds: Normal heart sounds.   Pulmonary:      Effort: Pulmonary effort is normal.      Breath sounds: Normal breath sounds.   Abdominal:      General: Bowel sounds are normal.      Palpations: Abdomen is soft.   Musculoskeletal:         General: Normal range of motion.      Cervical back: Normal range of motion and neck supple.   Skin:     General: Skin is warm and dry.   Neurological:      Mental Status: She is alert and oriented to person, place, and time.          Significant Labs: All pertinent lab results from the last 24 hours have been reviewed.    Significant Imaging: X-Ray: CXR: X-Ray Chest 1 View (CXR): No results found for this visit on 08/04/24.  Assessment and Plan:     Elevated troponin  79-year-old white woman with history of non-insulin-dependent diabetes mellitus type 2, hypertension, hyperlipidemia, hypothyroidism, admitted for covid pneumonia and hypoxemia.  Pt also with mild increase in troponin peak 1.5 .Patient denies CP, angina or anginal equivalent.Pt active at home before this episode, aerobics 3x week. EKG is normal.    Troponin is probably from demand ischemia. If echo normal follow up in cardiology clinic 2-4 weeks after discharge.        VTE Risk Mitigation (From admission, onward)           Ordered     enoxaparin injection 40 mg  Every 12 hours         08/04/24 2236     IP VTE HIGH RISK PATIENT  Once         08/04/24 2236     Place sequential compression device  Until discontinued         08/04/24 2236                    Thank you for your consult. I will follow-up with patient. Please contact us if you have any additional questions.    Jose Miller MD  Cardiology   [facility] Tele[facility] ([facility])

## 2024-08-05 NOTE — ASSESSMENT & PLAN NOTE
Elevated troponin most likely related to COVID-19 infection and underlying pulmonary process; however, patient does have multiple risk factors for cardiovascular disease and we will evaluate further  -patient denies chest pain, palpitations, and shortness a breath  -troponin 1.5-3, BNP 39  -EKG with normal sinus rhythm at 93 with nonspecific T-wave abnormalities  -CTA of chest reviewed as above with no evidence of PE  -recent labs 04/17/2024 with total cholesterol 190, triglycerides 46, HDL 89, LDL 92, TSH 0.78, free T4 1.59, and A1c 6.8  -give aspirin 325 mg p.o. x1 dose  -check serial cardiac enzymes  -check echocardiogram  -telemetry monitoring  -NPO at midnight in case cardiac evaluation/intervention pursued  -cardiology consult for a.m.

## 2024-08-05 NOTE — ASSESSMENT & PLAN NOTE
79-year-old white woman with history of non-insulin-dependent diabetes mellitus type 2, hypertension, hyperlipidemia, hypothyroidism, admitted for covid pneumonia and hypoxemia.  Pt also with mild increase in troponin peak 1.5 .Patient denies CP, angina or anginal equivalent.Pt active at home before this episode, aerobics 3x week. EKG is normal.    Troponin is probably from demand ischemia. If echo normal follow up in cardiology clinic 2-4 weeks after discharge.

## 2024-08-05 NOTE — ASSESSMENT & PLAN NOTE
"Patient's FSGs are controlled on current medication regimen.  Last A1c reviewed-   Lab Results   Component Value Date    HGBA1C 6.8 (H) 04/17/2024     Most recent fingerstick glucose reviewed- No results for input(s): "POCTGLUCOSE" in the last 24 hours.  Current correctional scale  Low  Maintain anti-hyperglycemic dose as follows-   Antihyperglycemics (From admission, onward)      Start     Stop Route Frequency Ordered    08/04/24 2335  insulin aspart U-100 pen 0-5 Units         -- SubQ Before meals & nightly PRN 08/04/24 2236          Hold Oral hypoglycemics while patient is in the hospital.  Hold Glucophage.  "

## 2024-08-05 NOTE — HPI
Nehal Jimenez is 79 y.o.  Asked to see for abn chest CT and respiratory infection: Covid  Saturday: seen in urgent care body care, nasal congestion  Cough, yellow expectorated sputum  COVID SARS +ve started on Albuterol, PAXLOVID and Flonase  Was asked to go to ER after CXR was read abnormal  Denied fever, No hemoptysis  No chest pain, No SOB   On RA  Most constutuional symptoms resolved after taking 1st dose of Paxlovid  Her Troponin was elevated Procalcitonin 14.6, Na 126    No sputum resulted: Blood cultures -ve to date

## 2024-08-05 NOTE — ASSESSMENT & PLAN NOTE
Elevated troponin most likely related to COVID-19 infection and underlying pulmonary process; however, patient does have multiple risk factors for cardiovascular disease and we will evaluate further  -patient denies chest pain, palpitations, and shortness a breath  -troponin 1.5-3, BNP 39  -EKG with normal sinus rhythm at 93 with nonspecific T-wave abnormalities  -CTA of chest reviewed as above with no evidence of PE  -recent labs 04/17/2024 with total cholesterol 190, triglycerides 46, HDL 89, LDL 92, TSH 0.78, free T4 1.59, and A1c 6.8  -give aspirin 325 mg p.o. x1 dose  -check serial cardiac enzymes  -check echocardiogram  -telemetry monitoring  -NPO at midnight in case cardiac evaluation/intervention pursued  -cardiology consult for a.m.      Demand ischemia sec to Covid, no NSTEMI

## 2024-08-05 NOTE — PROGRESS NOTES
[facility] Medicine  Progress Note    Patient Name: Nehal Jimenez  MRN: [id number]  Patient Class: IP- Inpatient   Admission Date: 8/4/2024  Length of Stay: 1 days  Attending Physician: Meagan Hernandez MD  Primary Care Provider: Cory Gibbons MD        Subjective:     Principal Problem:COVID-19        HPI:  79-year-old white woman with history of non-insulin-dependent diabetes mellitus type 2, hypertension, hyperlipidemia, hypothyroidism, urinary incontinence, and osteoporosis who originally went to urgent care today with complaint of rhinorrhea and cough and was diagnosed with COVID.  She had an abnormal chest x-ray with right middle lobe lung nodule and O2 saturations of 90-91% on room air and was sent to the emergency department.  Patient reports she has had upper respiratory infection symptoms over the last several days, constant, mild to moderate severity.  She has had no associated chest pain, shortness for breath, palpitations, nausea, vomiting, fevers, chills, diarrhea, constipation, or abdominal pain.  Her cough is nonproductive.  Abnormal labs in our emergency department include sodium 126, glucose 156, and troponin 1.523.  Patient denies any prior cardiac history.    Overview/Hospital Course:  79-year-old white woman with hx DM2, HTN, HLP, hypothyroidism, urinary incontinence, and osteoporosis sent from  to ER yesterday with c/o rhinorrhea and cough and was diagnosed with COVID.  She had an abnormal chest x-ray with RML nodule and O2 saturations of 90-91% on room air. Patient reports she has had upper respiratory infection symptoms over the last several days, constant, mild to moderate severity.  She has had no associated chest pain, shortness for breath, palpitations, nausea, vomiting, fevers, chills, diarrhea, constipation, or abdominal pain.  Her cough is nonproductive.  Abnormal labs in our emergency department include sodium 126, glucose 156, and troponin 1.523.   Patient denies any prior cardiac history.    8/5- Appreciate Cards and Pulm input. Looks and feels better, no CP or SOB, no fever, chills, getting Dexa plus IV abx. IS added. Troponin coming down, and cards thinks that the elevated Trop due to demand ischemia. Pulm also cleared her as Covid Pneumonia or possible [place] Synd. Na 127 now, d/c IVF, given Lasix 20 mg IV. Will add IS to present care and may d/c her soon.     Interval History: Appreciate Cards and Pulm input. Looks and feels better, no CP or SOB, no fever, chills, getting Dexa plus IV abx. IS added. Troponin coming down, and cards thinks that the elevated Trop due to demand ischemia. Pulm also cleared her as Covid Pneumonia or possible [place] Synd. Na 127 now, d/c IVF, given Lasix 20 mg IV. Will add IS to present care and may d/c her soon.     Review of Systems   Constitutional:  Positive for activity change. Negative for chills and fever.   HENT:  Positive for rhinorrhea.    Respiratory:  Positive for cough. Negative for shortness of breath.    Cardiovascular:  Negative for chest pain, palpitations and leg swelling.   Gastrointestinal:  Negative for abdominal pain, constipation, diarrhea, nausea and vomiting.   All other systems reviewed and are negative.    Objective:     Vital Signs (Most Recent):  Temp: 98.2 °F (36.8 °C) (08/05/24 1550)  Pulse: 83 (08/05/24 1550)  Resp: 18 (08/05/24 1550)  BP: 108/69 (08/05/24 1550)  SpO2: (!) 94 % (08/05/24 1550) Vital Signs (24h Range):  Temp:  [97.5 °F (36.4 °C)-99 °F (37.2 °C)] 98.2 °F (36.8 °C)  Pulse:  [66-90] 83  Resp:  [17-30] 18  SpO2:  [94 %-96 %] 94 %  BP: (108-146)/(62-72) 108/69     Weight: 52.6 kg (116 lb)  Body mass index is 17.13 kg/m².  No intake or output data in the 24 hours ending 08/05/24 1848      Physical Exam  Vitals and nursing note reviewed.   Constitutional:       Appearance: She is well-developed.   HENT:      Head: Normocephalic and atraumatic.      Nose: Nose normal.       Mouth/Throat:      Pharynx: No oropharyngeal exudate.   Eyes:      General: No scleral icterus.     Conjunctiva/sclera: Conjunctivae normal.      Pupils: Pupils are equal, round, and reactive to light.   Neck:      Thyroid: No thyromegaly.      Vascular: No JVD.      Trachea: No tracheal deviation.   Cardiovascular:      Rate and Rhythm: Normal rate and regular rhythm.      Heart sounds: Normal heart sounds, S1 normal and S2 normal. No murmur heard.  Pulmonary:      Effort: Pulmonary effort is normal. No tachypnea, accessory muscle usage or respiratory distress.      Breath sounds: Normal breath sounds. No stridor.   Abdominal:      General: Bowel sounds are normal. There is no distension.      Palpations: Abdomen is soft. There is no hepatomegaly, splenomegaly or mass.      Tenderness: There is no abdominal tenderness. There is no guarding or rebound.   Musculoskeletal:         General: No tenderness. Normal range of motion.      Cervical back: Normal range of motion and neck supple.   Lymphadenopathy:      Upper Body:      Right upper body: No supraclavicular adenopathy.      Left upper body: No supraclavicular adenopathy.   Skin:     General: Skin is warm and dry.      Findings: No rash.      Nails: There is no clubbing.   Neurological:      Mental Status: She is alert and oriented to person, place, and time.      Coordination: Coordination normal.      Gait: Gait normal.      Deep Tendon Reflexes: Reflexes are normal and symmetric.             Significant Labs: All pertinent labs within the past 24 hours have been reviewed.  Blood Culture:   Recent Labs   Lab 08/04/24 1921 08/04/24 1934   LABBLOO No Growth to date No Growth to date     BMP:   Recent Labs   Lab 08/05/24  0323   *   *   K 4.8   CL 94*   CO2 22*   BUN 9   CREATININE 0.8   CALCIUM 8.0*     CBC:   Recent Labs   Lab 08/04/24 1934 08/05/24  0323   WBC 5.55 2.56*   HGB 13.2 12.3   HCT 38.5 36.4*    209     CMP:   Recent Labs   Lab  "08/04/24 1934 08/05/24  0323   * 127*   K 4.1 4.8   CL 93* 94*   CO2 21* 22*   * 296*   BUN 12 9   CREATININE 0.8 0.8   CALCIUM 8.5* 8.0*   PROT 6.9 5.6*   ALBUMIN 3.5 3.0*   BILITOT 1.2* 0.9   ALKPHOS 73 69   AST 37 40   ALT 32 36   ANIONGAP 12 11     Cardiac Markers:   Recent Labs   Lab 08/04/24 1934   BNP 39     Lactic Acid:   Recent Labs   Lab 08/04/24 1934 08/04/24  2229   LACTATE 1.0 0.9     Troponin:   Recent Labs   Lab 08/04/24 1934 08/04/24  2330 08/05/24  0323   TROPONINI 1.523* 1.238* 0.731*       Significant Imaging: I have reviewed all pertinent imaging results/findings within the past 24 hours.    Assessment/Plan:      * COVID-19  Patient is identified as Severe COVID-19 based on hypoxemia with O2 saturations <94% on room air or on ambulation  Initiate standard COVID protocols; COVID-19 testing ,Infection Control notification  and isolation- respiratory, contact and droplet per protocol    Diagnostics: CBC, CMP, Procalcitonin, BNP, and Portable CXR  -white blood cell count 5.55, lactic acid level 1.0, COVID-19 positive, BNP 39, troponin 1.523, urinalysis negative, blood cultures pending, and currently afebrile  -CTA of chest with No pulmonary embolism.  No dissection. Moderate tree in bud opacities consistent with endobronchial infection.  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  Correlate clinically to chronic DORI infection."     Management: Initiate targeted therapy with Dexamethasone PO/IV 6mg daily x10 days, Maintain oxygen saturations 92-96% via Nasal Cannula  LPM and monitor with continuous pulse oximetry. , Inhaled bronchodilators as needed for shortness of breath., and Continuous cardiac monitoring.  -Decadron 6 mg p.o. daily added as patient with relative hypoxia 90-91% on room air upon ER arrival  -empiric IV Rocephin and IV azithromycin given abnormal CTA of chest  -check procalcitonin level  -check sputum culture, AFB culture, and fungal culture  -check " "Fungitell assay  -pulmonary consult for a.m. in the setting of abnormal CTA of chest    Doing better, on oral Dexa, no Remdesivir  May d/c soon    Hypothyroidism  -continue Synthroid  -recent labs 04/17/2024 with TSH 0.78 and free T4 1.59      Hyperlipidemia  -continue Pravachol  -recent lipids 04/17/2024 with total cholesterol 190, triglycerides 46, HDL 89, and LDL 92      Primary hypertension  Chronic, controlled. Latest blood pressure and vitals reviewed-     Temp:  [98.8 °F (37.1 °C)-99.3 °F (37.4 °C)]   Pulse:  [85-96]   Resp:  [17-30]   BP: (120-146)/(62-71)   SpO2:  [91 %-96 %] .   Home meds for hypertension were reviewed and noted below.   Hypertension Medications               lisinopriL (PRINIVIL,ZESTRIL) 2.5 MG tablet Take 1 tablet by mouth once daily.    losartan (COZAAR) 25 MG tablet losartan 25 mg tablet   TAKE 1 TABLET BY MOUTH DAILY            While in the hospital, will manage blood pressure as follows; Adjust home antihypertensive regimen as follows- hold lisinopril and Cozaar.  P.r.n. IV hydralazine with parameters.    Will utilize p.r.n. blood pressure medication only if patient's blood pressure greater than 160/100 and she develops symptoms such as worsening chest pain or shortness of breath.    Diabetes mellitus type 2, noninsulin dependent  Patient's FSGs are controlled on current medication regimen.  Last A1c reviewed-   Lab Results   Component Value Date    HGBA1C 6.8 (H) 04/17/2024     Most recent fingerstick glucose reviewed- No results for input(s): "POCTGLUCOSE" in the last 24 hours.  Current correctional scale  Low  Maintain anti-hyperglycemic dose as follows-   Antihyperglycemics (From admission, onward)      Start     Stop Route Frequency Ordered    08/04/24 2335  insulin aspart U-100 pen 0-5 Units         -- SubQ Before meals & nightly PRN 08/04/24 2236          Hold Oral hypoglycemics while patient is in the hospital.  Hold Glucophage.    DM well " controlled    Hyponatremia  Hyponatremia is likely due to unclear etiology. The patient's most recent sodium results are listed below.  Recent Labs     08/04/24  1934 08/05/24  0323   * 127*       Plan  - Correct the sodium by 4-6mEq in 24 hours.   - additional studies not indicated at this time--> recent thyroid studies were within normal range 04/17/2024.  Patient with chronic mild hyponatremia on review of past labs; sodium 139 on 04/17/2024 and 134 on 10/12/2023  - Will treat the hyponatremia with IV fluids as follows:  Normal saline at 100 mL/hr times 10 hours  - Monitor sodium Daily.   - Patient hyponatremia is  pending reassessment  -patient is on no home diuretics and denies vomiting or diarrhea    Possibly due to Covid Pneumonia  D/c IVF, give lasix  Check labs in am    Elevated troponin  Elevated troponin most likely related to COVID-19 infection and underlying pulmonary process; however, patient does have multiple risk factors for cardiovascular disease and we will evaluate further  -patient denies chest pain, palpitations, and shortness a breath  -troponin 1.5-3, BNP 39  -EKG with normal sinus rhythm at 93 with nonspecific T-wave abnormalities  -CTA of chest reviewed as above with no evidence of PE  -recent labs 04/17/2024 with total cholesterol 190, triglycerides 46, HDL 89, LDL 92, TSH 0.78, free T4 1.59, and A1c 6.8  -give aspirin 325 mg p.o. x1 dose  -check serial cardiac enzymes  -check echocardiogram  -telemetry monitoring  -NPO at midnight in case cardiac evaluation/intervention pursued  -cardiology consult for a.m.      Demand ischemia sec to Covid, no NSTEMI      VTE Risk Mitigation (From admission, onward)           Ordered     enoxaparin injection 40 mg  Every 12 hours         08/04/24 2236     IP VTE HIGH RISK PATIENT  Once         08/04/24 2236     Place sequential compression device  Until discontinued         08/04/24 2236                    Discharge Planning   RUMA:      Code  Status: Full Code   Is the patient medically ready for discharge?:     Reason for patient still in hospital (select all that apply): Patient trending condition, Laboratory test, Treatment, Imaging, and Consult recommendations  Discharge Plan A: Home with family          Meagan Hernandez MD  Department of Hospital Medicine   [facility] Tele[facility] ([facility])

## 2024-08-05 NOTE — ASSESSMENT & PLAN NOTE
"Patient's FSGs are controlled on current medication regimen.  Last A1c reviewed-   Lab Results   Component Value Date    HGBA1C 6.8 (H) 04/17/2024     Most recent fingerstick glucose reviewed- No results for input(s): "POCTGLUCOSE" in the last 24 hours.  Current correctional scale  Low  Maintain anti-hyperglycemic dose as follows-   Antihyperglycemics (From admission, onward)      Start     Stop Route Frequency Ordered    08/04/24 2335  insulin aspart U-100 pen 0-5 Units         -- SubQ Before meals & nightly PRN 08/04/24 2236          Hold Oral hypoglycemics while patient is in the hospital.  Hold Glucophage.    DM well controlled  "

## 2024-08-05 NOTE — ASSESSMENT & PLAN NOTE
Patient is identified as       Afebrile  On RA  PO Dexamethasone    Not candidate for REMDESIVIR    Outpatient PAXLOVID    Initiate standard COVID protocols; COVID-19 testing ,Infection Control notification  and isolation- respiratory, contact and droplet per protocol    Diagnostics: CBC, CMP, Ferritin, CRP, and Portable CXR    Management: Inhaled bronchodilators as needed for shortness of breath.    Advance Care Planning  Current advance care plan has not been discussed with patient/family/POA and patient currently wishes Full Code.

## 2024-08-05 NOTE — ASSESSMENT & PLAN NOTE
"Patient is identified as Severe COVID-19 based on hypoxemia with O2 saturations <94% on room air or on ambulation  Initiate standard COVID protocols; COVID-19 testing ,Infection Control notification  and isolation- respiratory, contact and droplet per protocol    Diagnostics: CBC, CMP, Procalcitonin, BNP, and Portable CXR  -white blood cell count 5.55, lactic acid level 1.0, COVID-19 positive, BNP 39, troponin 1.523, urinalysis negative, blood cultures pending, and currently afebrile  -CTA of chest with No pulmonary embolism.  No dissection. Moderate tree in bud opacities consistent with endobronchial infection.  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  Correlate clinically to chronic DORI infection."     Management: Initiate targeted therapy with Dexamethasone PO/IV 6mg daily x10 days, Maintain oxygen saturations 92-96% via Nasal Cannula  LPM and monitor with continuous pulse oximetry. , Inhaled bronchodilators as needed for shortness of breath., and Continuous cardiac monitoring.  -Decadron 6 mg p.o. daily added as patient with relative hypoxia 90-91% on room air upon ER arrival  -empiric IV Rocephin and IV azithromycin given abnormal CTA of chest  -check procalcitonin level  -check sputum culture, AFB culture, and fungal culture  -check Fungitell assay  -pulmonary consult for a.m. in the setting of abnormal CTA of chest  "

## 2024-08-05 NOTE — HOSPITAL COURSE
79-year-old white woman with hx DM2, HTN, HLP, hypothyroidism, urinary incontinence, and osteoporosis sent from  to ER yesterday with c/o rhinorrhea and cough and was diagnosed with COVID.  She had an abnormal chest x-ray with RML nodule and O2 saturations of 90-91% on room air. Patient reports she has had upper respiratory infection symptoms over the last several days, constant, mild to moderate severity.  She has had no associated chest pain, shortness for breath, palpitations, nausea, vomiting, fevers, chills, diarrhea, constipation, or abdominal pain.  Her cough is nonproductive.  Abnormal labs in our emergency department include sodium 126, glucose 156, and troponin 1.523.  Patient denies any prior cardiac history.    8/5- Appreciate Cards and Pulm input. Looks and feels better, no CP or SOB, no fever, chills, getting Dexa plus IV abx. IS added. Troponin coming down, and cards thinks that the elevated Trop due to demand ischemia. Pulm also cleared her as Covid Pneumonia or possible [place] Synd. Na 127 now, d/c IVF, given Lasix 20 mg IV. Will add IS to present care and may d/c her soon.     8/6- looks and feels much better, stronger, breathing easier, eating drinking well. Cough, SOB much improved. Good response to Dexamethasone plus Remdesivir. Wishes to go home. Sharla Aldrich and Paul also examined and cleared her, elevated troponin likely from Demand Ischemia. Her Sats remained 96% on RA and she did not need any O2. She was seen and examined and deemed stable for discharge home today.

## 2024-08-05 NOTE — ASSESSMENT & PLAN NOTE
Hyponatremia is likely due to unclear etiology. The patient's most recent sodium results are listed below.  Recent Labs     08/04/24  1934   *     Plan  - Correct the sodium by 4-6mEq in 24 hours.   - additional studies not indicated at this time--> recent thyroid studies were within normal range 04/17/2024.  Patient with chronic mild hyponatremia on review of past labs; sodium 139 on 04/17/2024 and 134 on 10/12/2023  - Will treat the hyponatremia with IV fluids as follows:  Normal saline at 100 mL/hr times 10 hours  - Monitor sodium Daily.   - Patient hyponatremia is  pending reassessment  -patient is on no home diuretics and denies vomiting or diarrhea

## 2024-08-05 NOTE — ASSESSMENT & PLAN NOTE
"Patient is identified as Severe COVID-19 based on hypoxemia with O2 saturations <94% on room air or on ambulation  Initiate standard COVID protocols; COVID-19 testing ,Infection Control notification  and isolation- respiratory, contact and droplet per protocol    Diagnostics: CBC, CMP, Procalcitonin, BNP, and Portable CXR  -white blood cell count 5.55, lactic acid level 1.0, COVID-19 positive, BNP 39, troponin 1.523, urinalysis negative, blood cultures pending, and currently afebrile  -CTA of chest with No pulmonary embolism.  No dissection. Moderate tree in bud opacities consistent with endobronchial infection.  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  Correlate clinically to chronic DORI infection."     Management: Initiate targeted therapy with Dexamethasone PO/IV 6mg daily x10 days, Maintain oxygen saturations 92-96% via Nasal Cannula  LPM and monitor with continuous pulse oximetry. , Inhaled bronchodilators as needed for shortness of breath., and Continuous cardiac monitoring.  -Decadron 6 mg p.o. daily added as patient with relative hypoxia 90-91% on room air upon ER arrival  -empiric IV Rocephin and IV azithromycin given abnormal CTA of chest  -check procalcitonin level  -check sputum culture, AFB culture, and fungal culture  -check Fungitell assay  -pulmonary consult for a.m. in the setting of abnormal CTA of chest    Doing better, on oral Dexa, no Remdesivir  May d/c soon  "

## 2024-08-05 NOTE — SUBJECTIVE & OBJECTIVE
Interval History: Appreciate Cards and Pulm input. Looks and feels better, no CP or SOB, no fever, chills, getting Dexa plus IV abx. IS added. Troponin coming down, and cards thinks that the elevated Trop due to demand ischemia. Pulm also cleared her as Covid Pneumonia or possible [place] [facility]. Na 127 now, d/c IVF, given Lasix 20 mg IV. Will add IS to present care and may d/c her soon.     Review of Systems   Constitutional:  Positive for activity change. Negative for chills and fever.   HENT:  Positive for rhinorrhea.    Respiratory:  Positive for cough. Negative for shortness of breath.    Cardiovascular:  Negative for chest pain, palpitations and leg swelling.   Gastrointestinal:  Negative for abdominal pain, constipation, diarrhea, nausea and vomiting.   All other systems reviewed and are negative.    Objective:     Vital Signs (Most Recent):  Temp: 98.2 °F (36.8 °C) (08/05/24 1550)  Pulse: 83 (08/05/24 1550)  Resp: 18 (08/05/24 1550)  BP: 108/69 (08/05/24 1550)  SpO2: (!) 94 % (08/05/24 1550) Vital Signs (24h Range):  Temp:  [97.5 °F (36.4 °C)-99 °F (37.2 °C)] 98.2 °F (36.8 °C)  Pulse:  [66-90] 83  Resp:  [17-30] 18  SpO2:  [94 %-96 %] 94 %  BP: (108-146)/(62-72) 108/69     Weight: 52.6 kg (116 lb)  Body mass index is 17.13 kg/m².  No intake or output data in the 24 hours ending 08/05/24 1848      Physical Exam  Vitals and nursing note reviewed.   Constitutional:       Appearance: She is well-developed.   HENT:      Head: Normocephalic and atraumatic.      Nose: Nose normal.      Mouth/Throat:      Pharynx: No oropharyngeal exudate.   Eyes:      General: No scleral icterus.     Conjunctiva/sclera: Conjunctivae normal.      Pupils: Pupils are equal, round, and reactive to light.   Neck:      Thyroid: No thyromegaly.      Vascular: No JVD.      Trachea: No tracheal deviation.   Cardiovascular:      Rate and Rhythm: Normal rate and regular rhythm.      Heart sounds: Normal heart sounds, S1 normal and S2 normal.  No murmur heard.  Pulmonary:      Effort: Pulmonary effort is normal. No tachypnea, accessory muscle usage or respiratory distress.      Breath sounds: Normal breath sounds. No stridor.   Abdominal:      General: Bowel sounds are normal. There is no distension.      Palpations: Abdomen is soft. There is no hepatomegaly, splenomegaly or mass.      Tenderness: There is no abdominal tenderness. There is no guarding or rebound.   Musculoskeletal:         General: No tenderness. Normal range of motion.      Cervical back: Normal range of motion and neck supple.   Lymphadenopathy:      Upper Body:      Right upper body: No supraclavicular adenopathy.      Left upper body: No supraclavicular adenopathy.   Skin:     General: Skin is warm and dry.      Findings: No rash.      Nails: There is no clubbing.   Neurological:      Mental Status: She is alert and oriented to person, place, and time.      Coordination: Coordination normal.      Gait: Gait normal.      Deep Tendon Reflexes: Reflexes are normal and symmetric.             Significant Labs: All pertinent labs within the past 24 hours have been reviewed.  Blood Culture:   Recent Labs   Lab 08/04/24 1921 08/04/24 1934   LABBLOO No Growth to date No Growth to date     BMP:   Recent Labs   Lab 08/05/24  0323   *   *   K 4.8   CL 94*   CO2 22*   BUN 9   CREATININE 0.8   CALCIUM 8.0*     CBC:   Recent Labs   Lab 08/04/24 1934 08/05/24  0323   WBC 5.55 2.56*   HGB 13.2 12.3   HCT 38.5 36.4*    209     CMP:   Recent Labs   Lab 08/04/24 1934 08/05/24  0323   * 127*   K 4.1 4.8   CL 93* 94*   CO2 21* 22*   * 296*   BUN 12 9   CREATININE 0.8 0.8   CALCIUM 8.5* 8.0*   PROT 6.9 5.6*   ALBUMIN 3.5 3.0*   BILITOT 1.2* 0.9   ALKPHOS 73 69   AST 37 40   ALT 32 36   ANIONGAP 12 11     Cardiac Markers:   Recent Labs   Lab 08/04/24 1934   BNP 39     Lactic Acid:   Recent Labs   Lab 08/04/24 1934 08/04/24 2229   LACTATE 1.0 0.9     Troponin:   Recent  Labs   Lab 08/04/24  1934 08/04/24  2330 08/05/24  0323   TROPONINI 1.523* 1.238* 0.731*       Significant Imaging: I have reviewed all pertinent imaging results/findings within the past 24 hours.

## 2024-08-05 NOTE — SUBJECTIVE & OBJECTIVE
Past Medical History:   Diagnosis Date    Diabetes mellitus     Hypercholesteremia     Hypertension     Hypothyroidism     Osteopenia     Endocrinologist treating       Past Surgical History:   Procedure Laterality Date    BREAST BIOPSY      BREAST CYST ASPIRATION      COLONOSCOPY  2016    [facility]    ENDOSCOPY         Review of patient's allergies indicates:   Allergen Reactions    Adhesive tape-silicones     Latex, natural rubber        No current facility-administered medications on file prior to encounter.     Current Outpatient Medications on File Prior to Encounter   Medication Sig    albuterol (VENTOLIN HFA) 90 mcg/actuation inhaler Inhale 2 puffs into the lungs every 6 (six) hours as needed for Wheezing. Inhaler    calcium carbonate (CALCIUM 300 ORAL) Take by mouth.    calcium citrate-vitamin D3 315-200 mg (CITRACAL+D) 315 mg-5 mcg (200 unit) per tablet Take 1 tablet by mouth 2 (two) times daily.    ergocalciferol, vitamin D2, (VITAMIN D ORAL) Take by mouth.    levothyroxine (SYNTHROID) 125 MCG tablet Take by mouth.    losartan (COZAAR) 25 MG tablet losartan 25 mg tablet   TAKE 1 TABLET BY MOUTH DAILY    metFORMIN (GLUCOPHAGE-XR) 500 MG ER 24hr tablet Take 500 mg by mouth once daily.    moxifloxacin (VIGAMOX) 0.5 % ophthalmic solution Place 1 drop into the left eye 4 (four) times daily.    nirmatrelvir-ritonavir (PAXLOVID) 300 mg (150 mg x 2)-100 mg copackaged tablets (EUA) Take 3 tablets by mouth 2 (two) times daily. Each dose contains 2 nirmatrelvir (pink tablets) and 1 ritonavir (white tablet). Take all 3 tablets together  HOLD STATIN/CHOLESTERAL MED X 7 DAYS    pravastatin (PRAVACHOL) 40 MG tablet Take 1 tablet by mouth once daily.    azithromycin (Z-STIVEN) 250 MG tablet azithromycin 250 mg tablet (Patient not taking: Reported on 8/4/2024)    denosumab (PROLIA) 60 mg/mL Syrg Inject 60 mg into the skin.    lancets 33 gauge Misc USE EVERY DAY    levocetirizine (XYZAL) 5 MG tablet Take 5 mg by mouth every  evening. (Patient not taking: Reported on 8/4/2024)    lisinopriL (PRINIVIL,ZESTRIL) 2.5 MG tablet Take 1 tablet by mouth once daily. (Patient not taking: Reported on 8/4/2024)    meclizine (ANTIVERT) 25 MG tablet meclizine 25 mg chewable tablet   CHEW AND SWALLOW 1 TABLET BY MOUTH THREE TIMES DAILY AS NEEDED (Patient not taking: Reported on 8/4/2024)    mirabegron (MYRBETRIQ) 50 mg Tb24 Take 1 tablet by mouth once daily. (Patient not taking: Reported on 8/4/2024)    mometasone (NASONEX) 50 mcg/actuation nasal spray by Nasal route. (Patient not taking: Reported on 8/4/2024)    ONETOUCH ULTRA TEST Strp TEST BLOOD SUGAR ONE TIME DAILY    pantoprazole (PROTONIX) 40 MG tablet pantoprazole 40 mg tablet,delayed release   TAKE 1 TABLET BY MOUTH EVERY MORNING (Patient not taking: Reported on 8/4/2024)    pimecrolimus (ELIDEL) 1 % cream 2 (two) times daily. (Patient not taking: Reported on 8/4/2024)    predniSONE (DELTASONE) 5 MG tablet prednisone 5 mg tablet   TAKE 1 TABLET BY MOUTH EVERY DAY IN THE MORNING (Patient not taking: Reported on 8/4/2024)     Family History       Problem Relation (Age of Onset)    Colon cancer Father (86)    Hodgkin's lymphoma Father          Tobacco Use    Smoking status: Never    Smokeless tobacco: Never   Substance and Sexual Activity    Alcohol use: Never    Drug use: Never    Sexual activity: Not Currently     Partners: Male     Birth control/protection: None     Review of Systems   Constitutional:  Negative for chills and fever.   HENT:  Positive for rhinorrhea.    Respiratory:  Positive for cough. Negative for shortness of breath.    Cardiovascular:  Negative for chest pain, palpitations and leg swelling.   Gastrointestinal:  Negative for abdominal pain, constipation, diarrhea, nausea and vomiting.   All other systems reviewed and are negative.    Objective:     Vital Signs (Most Recent):  Temp: 98.9 °F (37.2 °C) (08/04/24 2202)  Pulse: 85 (08/04/24 2202)  Resp: (!) 22 (08/04/24 2202)  BP:  134/71 (08/04/24 2202)  SpO2: 95 % (08/04/24 2202) Vital Signs (24h Range):  Temp:  [98.8 °F (37.1 °C)-99.3 °F (37.4 °C)] 98.9 °F (37.2 °C)  Pulse:  [85-96] 85  Resp:  [17-30] 22  SpO2:  [91 %-96 %] 95 %  BP: (120-146)/(62-71) 134/71     Weight: 54 kg (119 lb 0.8 oz)  Body mass index is 17.63 kg/m².     Physical Exam  Vitals reviewed.   Constitutional:       General: She is not in acute distress.     Appearance: She is not ill-appearing or diaphoretic.   HENT:      Nose: Nose normal.   Eyes:      Conjunctiva/sclera: Conjunctivae normal.   Cardiovascular:      Rate and Rhythm: Normal rate.   Pulmonary:      Effort: Pulmonary effort is normal. No respiratory distress.      Breath sounds: No wheezing.   Abdominal:      General: There is no distension.      Tenderness: There is no abdominal tenderness.   Musculoskeletal:         General: No swelling.   Skin:     General: Skin is warm and dry.   Neurological:      Mental Status: She is alert and oriented to person, place, and time.   Psychiatric:         Mood and Affect: Mood normal.         Behavior: Behavior normal.                Significant Labs: All pertinent labs within the past 24 hours have been reviewed.  Recent Lab Results         08/04/24  2117 08/04/24  1934   08/04/24  1001        Albumin   3.5         ALP   73         ALT   32         Anion Gap   12         Appearance, UA Clear           AST   37         Baso #   0.01         Basophil %   0.2         Bilirubin (UA) Negative           BILIRUBIN TOTAL   1.2  Comment: For infants and newborns, interpretation of results should be based  on gestational age, weight and in agreement with clinical  observations.    Premature Infant recommended reference ranges:  Up to 24 hours.............<8.0 mg/dL  Up to 48 hours............<12.0 mg/dL  3-5 days..................<15.0 mg/dL  6-29 days.................<15.0 mg/dL           BNP   39  Comment: Values of less than 100 pg/ml are consistent with non-CHF  populations.         BUN   12         Calcium   8.5         Chloride   93         CO2   21         Color, UA Colorless           Creatinine   0.8         Differential Method   Automated         eGFR   >60         Eos #   0.0         Eos %   0.2         Glucose   156         Glucose, UA Negative           Gran # (ANC)   5.4         Gran %   96.3         Hematocrit   38.5         Hemoglobin   13.2         Immature Grans (Abs)   0.04  Comment: Mild elevation in immature granulocytes is non specific and   can be seen in a variety of conditions including stress response,   acute inflammation, trauma and pregnancy. Correlation with other   laboratory and clinical findings is essential.           Immature Granulocytes   0.7         Ketones, UA 1+           Lactic Acid Level   1.0  Comment: Falsely low lactic acid results can be found in samples   containing >=13.0 mg/dL total bilirubin and/or >=3.5 mg/dL   direct bilirubin.           Leukocyte Esterase, UA Negative           Lymph #   0.1         Lymph %   2.2         MCH   31.2         MCHC   34.3         MCV   91         Mono #   0.0         Mono %   0.4         MPV   9.8         NITRITE UA Negative           nRBC   0         Blood, UA Negative           pH, UA 7.0           Platelet Count   204         Potassium   4.1         PROTEIN TOTAL   6.9         Protein, UA Negative  Comment: Recommend a 24 hour urine protein or a urine   protein/creatinine ratio if globulin induced proteinuria is  clinically suspected.              Acceptable     Yes       RBC   4.23         RDW   12.3         SARS Coronavirus 2 Antigen     Positive       Sodium   126         Spec Grav UA 1.030           Specimen UA Urine, Clean Catch           Troponin I   1.523  Comment: The reference interval for Troponin I represents the 99th percentile   cutoff   for our facility and is consistent with 3rd generation assay   performance.           UROBILINOGEN UA Negative           WBC    5.55                 Significant Imaging: I have reviewed all pertinent imaging results/findings within the past 24 hours.  CTA Chest Non-Coronary (PE Studies)   Final Result      No pulmonary embolism.  No dissection.      Moderate tree in bud opacities consistent with endobronchial infection.  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  Correlate clinically to chronic DORI infection.      All CT scans   are performed using dose optimization techniques including the following: automated exposure control; adjustment of the mA and/or kV; use of iterative reconstruction technique.  Dose modulation was employed for ALARA by means of: Automated exposure control; adjustment of the mA and/or kV according to patient size (this includes techniques or standardized protocols for targeted exams where dose is matched to indication/reason for exam; i.e. extremities or head); and/or use of iterative reconstructive technique.         Electronically signed by: Lee Marrero   Date:    08/04/2024   Time:    21:08      X-Ray Chest AP Portable   Final Result      No acute abnormality.  Chronic changes similar to prior exam.  Stable exam         Electronically signed by: Lee Marrero   Date:    08/04/2024   Time:    19:57

## 2024-08-05 NOTE — ASSESSMENT & PLAN NOTE
Hyponatremia is likely due to unclear etiology. The patient's most recent sodium results are listed below.  Recent Labs     08/04/24  1934 08/05/24  0323   * 127*       Plan  - Correct the sodium by 4-6mEq in 24 hours.   - additional studies not indicated at this time--> recent thyroid studies were within normal range 04/17/2024.  Patient with chronic mild hyponatremia on review of past labs; sodium 139 on 04/17/2024 and 134 on 10/12/2023  - Will treat the hyponatremia with IV fluids as follows:  Normal saline at 100 mL/hr times 10 hours  - Monitor sodium Daily.   - Patient hyponatremia is  pending reassessment  -patient is on no home diuretics and denies vomiting or diarrhea    Possibly due to Covid Pneumonia  D/c IVF, give lasix  Check labs in am

## 2024-08-05 NOTE — ASSESSMENT & PLAN NOTE
-continue Pravachol  -recent lipids 04/17/2024 with total cholesterol 190, triglycerides 46, HDL 89, and LDL 92

## 2024-08-05 NOTE — ASSESSMENT & PLAN NOTE
Chronic, controlled. Latest blood pressure and vitals reviewed-     Temp:  [98.8 °F (37.1 °C)-99.3 °F (37.4 °C)]   Pulse:  [85-96]   Resp:  [17-30]   BP: (120-146)/(62-71)   SpO2:  [91 %-96 %] .   Home meds for hypertension were reviewed and noted below.   Hypertension Medications               lisinopriL (PRINIVIL,ZESTRIL) 2.5 MG tablet Take 1 tablet by mouth once daily.    losartan (COZAAR) 25 MG tablet losartan 25 mg tablet   TAKE 1 TABLET BY MOUTH DAILY            While in the hospital, will manage blood pressure as follows; Adjust home antihypertensive regimen as follows- hold lisinopril and Cozaar.  P.r.n. IV hydralazine with parameters.    Will utilize p.r.n. blood pressure medication only if patient's blood pressure greater than 160/100 and she develops symptoms such as worsening chest pain or shortness of breath.

## 2024-08-05 NOTE — SUBJECTIVE & OBJECTIVE
Past Medical History:   Diagnosis Date    Diabetes mellitus     Hypercholesteremia     Hypertension     Hypothyroidism     Osteopenia     Endocrinologist treating       Past Surgical History:   Procedure Laterality Date    BREAST BIOPSY      BREAST CYST ASPIRATION      COLONOSCOPY  2016    [facility]    ENDOSCOPY         Review of patient's allergies indicates:   Allergen Reactions    Adhesive tape-silicones     Latex, natural rubber        No current facility-administered medications on file prior to encounter.     Current Outpatient Medications on File Prior to Encounter   Medication Sig    albuterol (VENTOLIN HFA) 90 mcg/actuation inhaler Inhale 2 puffs into the lungs every 6 (six) hours as needed for Wheezing. Inhaler    calcium carbonate (CALCIUM 300 ORAL) Take by mouth.    calcium citrate-vitamin D3 315-200 mg (CITRACAL+D) 315 mg-5 mcg (200 unit) per tablet Take 1 tablet by mouth 2 (two) times daily.    ergocalciferol, vitamin D2, (VITAMIN D ORAL) Take by mouth.    levothyroxine (SYNTHROID) 125 MCG tablet Take by mouth.    losartan (COZAAR) 25 MG tablet losartan 25 mg tablet   TAKE 1 TABLET BY MOUTH DAILY    metFORMIN (GLUCOPHAGE-XR) 500 MG ER 24hr tablet Take 500 mg by mouth once daily.    moxifloxacin (VIGAMOX) 0.5 % ophthalmic solution Place 1 drop into the left eye 4 (four) times daily.    nirmatrelvir-ritonavir (PAXLOVID) 300 mg (150 mg x 2)-100 mg copackaged tablets (EUA) Take 3 tablets by mouth 2 (two) times daily. Each dose contains 2 nirmatrelvir (pink tablets) and 1 ritonavir (white tablet). Take all 3 tablets together  HOLD STATIN/CHOLESTERAL MED X 7 DAYS    pravastatin (PRAVACHOL) 40 MG tablet Take 1 tablet by mouth once daily.    azithromycin (Z-STIVEN) 250 MG tablet azithromycin 250 mg tablet (Patient not taking: Reported on 8/4/2024)    denosumab (PROLIA) 60 mg/mL Syrg Inject 60 mg into the skin.    lancets 33 gauge Misc USE EVERY DAY    levocetirizine (XYZAL) 5 MG tablet Take 5 mg by mouth every  evening. (Patient not taking: Reported on 8/4/2024)    lisinopriL (PRINIVIL,ZESTRIL) 2.5 MG tablet Take 1 tablet by mouth once daily. (Patient not taking: Reported on 8/4/2024)    meclizine (ANTIVERT) 25 MG tablet meclizine 25 mg chewable tablet   CHEW AND SWALLOW 1 TABLET BY MOUTH THREE TIMES DAILY AS NEEDED (Patient not taking: Reported on 8/4/2024)    mirabegron (MYRBETRIQ) 50 mg Tb24 Take 1 tablet by mouth once daily. (Patient not taking: Reported on 8/4/2024)    mometasone (NASONEX) 50 mcg/actuation nasal spray by Nasal route. (Patient not taking: Reported on 8/4/2024)    ONETOUCH ULTRA TEST Strp TEST BLOOD SUGAR ONE TIME DAILY    pantoprazole (PROTONIX) 40 MG tablet pantoprazole 40 mg tablet,delayed release   TAKE 1 TABLET BY MOUTH EVERY MORNING (Patient not taking: Reported on 8/4/2024)    pimecrolimus (ELIDEL) 1 % cream 2 (two) times daily. (Patient not taking: Reported on 8/4/2024)    predniSONE (DELTASONE) 5 MG tablet prednisone 5 mg tablet   TAKE 1 TABLET BY MOUTH EVERY DAY IN THE MORNING (Patient not taking: Reported on 8/4/2024)     Family History       Problem Relation (Age of Onset)    Colon cancer Father (86)    Hodgkin's lymphoma Father          Tobacco Use    Smoking status: Never    Smokeless tobacco: Never   Substance and Sexual Activity    Alcohol use: Never    Drug use: Never    Sexual activity: Not Currently     Partners: Male     Birth control/protection: None     Review of Systems   Constitutional: Negative. Negative for weight gain.   HENT: Negative.     Eyes: Negative.    Cardiovascular: Negative.  Negative for chest pain, leg swelling and palpitations.   Respiratory: Negative.  Negative for shortness of breath.    Endocrine: Negative.    Hematologic/Lymphatic: Negative.    Skin: Negative.    Musculoskeletal:  Negative for muscle weakness.   Gastrointestinal: Negative.    Genitourinary: Negative.    Neurological: Negative.  Negative for dizziness.   Psychiatric/Behavioral: Negative.      Allergic/Immunologic: Negative.    All other systems reviewed and are negative.    Objective:     Vital Signs (Most Recent):  Temp: 97.5 °F (36.4 °C) (08/05/24 0805)  Pulse: 75 (08/05/24 0812)  Resp: 18 (08/05/24 0805)  BP: 126/72 (08/05/24 0805)  SpO2: (!) 94 % (08/05/24 0805) Vital Signs (24h Range):  Temp:  [97.5 °F (36.4 °C)-99.3 °F (37.4 °C)] 97.5 °F (36.4 °C)  Pulse:  [66-96] 75  Resp:  [17-30] 18  SpO2:  [91 %-96 %] 94 %  BP: (108-146)/(62-72) 126/72     Weight: 52.6 kg (116 lb)  Body mass index is 17.13 kg/m².    SpO2: (!) 94 %       No intake or output data in the 24 hours ending 08/05/24 0950    Lines/Drains/Airways       Peripheral Intravenous Line  Duration                  Peripheral IV - Single Lumen 08/04/24 1936 20 G Left Antecubital <1 day                     Physical Exam  Vitals and nursing note reviewed.   Constitutional:       Appearance: She is well-developed.   HENT:      Head: Normocephalic and atraumatic.   Eyes:      Conjunctiva/sclera: Conjunctivae normal.      Pupils: Pupils are equal, round, and reactive to light.   Cardiovascular:      Rate and Rhythm: Normal rate and regular rhythm.      Pulses: Intact distal pulses.      Heart sounds: Normal heart sounds.   Pulmonary:      Effort: Pulmonary effort is normal.      Breath sounds: Normal breath sounds.   Abdominal:      General: Bowel sounds are normal.      Palpations: Abdomen is soft.   Musculoskeletal:         General: Normal range of motion.      Cervical back: Normal range of motion and neck supple.   Skin:     General: Skin is warm and dry.   Neurological:      Mental Status: She is alert and oriented to person, place, and time.          Significant Labs: All pertinent lab results from the last 24 hours have been reviewed.    Significant Imaging: X-Ray: CXR: X-Ray Chest 1 View (CXR): No results found for this visit on 08/04/24.

## 2024-08-05 NOTE — ASSESSMENT & PLAN NOTE
Hyponatremia is likely due to COVID, Bronchiectasis. The patient's most recent sodium results are listed below.  Recent Labs     08/04/24  1934 08/05/24  0323   * 127*     Plan  - Correct the sodium by 4-6mEq in 24 hours.   - Obtain the following studies:    - Will treat the hyponatremia with Removal of offending medications  - Monitor sodium Daily.   - Patient hyponatremia is improving  -

## 2024-08-05 NOTE — PLAN OF CARE
pt able to make needs known  pt remained afebrile throughout this shift  pt remained free of falls this shift  plan of care reviewed pt verbalizes understanding  Pt moving/turning independent. Frequent weight shifting encouraged  Pt sinus rhythm of monitor  Bed low, side rails up x 2, wheels locked,call light in reach  Hourly rounding completed  Will continue to observe    Problem: Adult Inpatient Plan of Care  Goal: Plan of Care Review  Outcome: Progressing

## 2024-08-05 NOTE — ED PROVIDER NOTES
SCRIBE #1 NOTE: I, Amalia Doherty am scribing for, and in the presence of, Garcia Cardoza MD. I have scribed the HPI/ROS/PEx    SCRIBE #2 NOTE: I, Nisreen Nguyen am scribing for, and in the presence of,  Phuc Cintron MD. I have scribed the remaining portions of the note not scribed by Scribe #1.      History     Chief Complaint   Patient presents with    COVID-19 Concerns      sent pt dx with covid today with abnormal chest xr results, denies sob, c/o cough     Review of patient's allergies indicates:   Allergen Reactions    Adhesive tape-silicones     Latex, natural rubber          History of Present Illness     HPI    8/4/2024, 7:33 PM  History obtained from the patient and       History of Present Illness: Nehal Jimenez is a 79 y.o. female patient with a PMHx of DM, hypercholesteremia, hypothyroidism, osteopenia, and HTN who presents to the Emergency Department for evaluation of abnormal chest x-ray results that showed a nodule in her R middle lobe which were gathered PTA during an Urgent Care visit. The pt and her  went to  for sxs of rhinorrhea and cough; they were dx with COVID-19. Symptoms are constant and moderate in severity. No mitigating or exacerbating factors reported. Patient denies any SOB, fever, n/v, fatigue, chills, appetite changes, and all other sxs at this time. No prior tx.  notes that pt's O2 was also in the low 90s at Urgent care. No further complaints or concerns at this time.       Arrival mode: Personal vehicle    PCP: Cory Gibbons MD        Past Medical History:  Past Medical History:   Diagnosis Date    Diabetes mellitus     Hypercholesteremia     Hypertension     Hypothyroidism     Osteopenia     Endocrinologist treating       Past Surgical History:  Past Surgical History:   Procedure Laterality Date    BREAST BIOPSY      BREAST CYST ASPIRATION      COLONOSCOPY  2016    [facility]    ENDOSCOPY           Family History:  Family History   Problem Relation  Name Age of Onset    Colon cancer Father  86    Hodgkin's lymphoma Father         Social History:  Social History     Tobacco Use    Smoking status: Never    Smokeless tobacco: Never   Substance and Sexual Activity    Alcohol use: Never    Drug use: Never    Sexual activity: Not Currently     Partners: Male     Birth control/protection: None        Review of Systems     Review of Systems   Constitutional:  Negative for appetite change, chills, fatigue and fever.   HENT:  Positive for rhinorrhea. Negative for sore throat.    Respiratory:  Positive for cough. Negative for shortness of breath.    Cardiovascular:  Negative for chest pain.   Gastrointestinal:  Negative for nausea and vomiting.   Genitourinary:  Negative for dysuria.   Musculoskeletal:  Negative for back pain.   Skin:  Negative for rash.   Neurological:  Negative for weakness.   Hematological:  Does not bruise/bleed easily.   All other systems reviewed and are negative.     Physical Exam     Initial Vitals [08/04/24 1830]   BP Pulse Resp Temp SpO2   122/62 96 20 99.3 °F (37.4 °C) (!) 92 %      MAP       --          Physical Exam  Nursing Notes and Vital Signs Reviewed.  Constitutional: Patient is in no acute distress. Appears stated age.  Head: Atraumatic. Normocephalic.  Eyes: EOM intact. Conjunctivae are not pale. No scleral icterus.  ENT: Mucous membranes are moist. Oropharynx is clear and symmetric.    Neck: Supple. Full ROM. No lymphadenopathy.  Cardiovascular: Regular rate. Regular rhythm. No murmurs, rubs, or gallops.  Pulmonary/Chest: No respiratory distress. Clear to auscultation bilaterally. No wheezing or rales. No retractions.  Abdominal: Soft and non-distended.  There is no tenderness.  No rebound, guarding, or rigidity. Good bowel sounds.  Genitourinary: No CVA tenderness  Musculoskeletal: Moves all extremities. No obvious deformities. No edema. No calf tenderness.  Skin: Warm and dry.  Neurological:  Alert, awake, and appropriate.  Normal  "speech.  No acute focal neurological deficits are appreciated.  Psychiatric: Normal affect. Good eye contact. Appropriate in content.     ED Course   Procedures  ED Vital Signs:  Vitals:    08/04/24 1830 08/04/24 1920 08/04/24 1933 08/04/24 2102   BP: 122/62  123/67 (!) 146/68   Pulse: 96 89 88 90   Resp: 20  (!) 30 17   Temp: 99.3 °F (37.4 °C)   99 °F (37.2 °C)   TempSrc:       SpO2: (!) 92%  95% 96%   Weight: 54 kg (119 lb 0.8 oz)      Height: 5' 8.9" (1.75 m)       08/04/24 2202 08/04/24 2320 08/04/24 2334 08/05/24 0023   BP: 134/71 123/68     Pulse: 85 79 80 70   Resp: (!) 22 18  20   Temp: 98.9 °F (37.2 °C) 97.5 °F (36.4 °C)  97.9 °F (36.6 °C)   TempSrc:    Oral   SpO2: 95% (!) 94%  (!) 94%   Weight:       Height:        08/05/24 0024 08/05/24 0410   BP: 123/68    Pulse: 70 66   Resp: 20    Temp: 97.9 °F (36.6 °C)    TempSrc:     SpO2: (!) 94%    Weight:     Height:         Abnormal Lab Results:  Labs Reviewed   CBC W/ AUTO DIFFERENTIAL - Abnormal       Result Value    WBC 5.55      RBC 4.23      Hemoglobin 13.2      Hematocrit 38.5      MCV 91      MCH 31.2 (*)     MCHC 34.3      RDW 12.3      Platelets 204      MPV 9.8      Immature Granulocytes 0.7 (*)     Gran # (ANC) 5.4      Immature Grans (Abs) 0.04      Lymph # 0.1 (*)     Mono # 0.0 (*)     Eos # 0.0      Baso # 0.01      nRBC 0      Gran % 96.3 (*)     Lymph % 2.2 (*)     Mono % 0.4 (*)     Eosinophil % 0.2      Basophil % 0.2      Differential Method Automated     COMPREHENSIVE METABOLIC PANEL - Abnormal    Sodium 126 (*)     Potassium 4.1      Chloride 93 (*)     CO2 21 (*)     Glucose 156 (*)     BUN 12      Creatinine 0.8      Calcium 8.5 (*)     Total Protein 6.9      Albumin 3.5      Total Bilirubin 1.2 (*)     Alkaline Phosphatase 73      AST 37      ALT 32      eGFR >60      Anion Gap 12     URINALYSIS, REFLEX TO URINE CULTURE - Abnormal    Specimen UA Urine, Clean Catch      Color, UA Colorless (*)     Appearance, UA Clear      pH, UA 7.0   "    Specific Gravity, UA 1.030      Protein, UA Negative      Glucose, UA Negative      Ketones, UA 1+ (*)     Bilirubin (UA) Negative      Occult Blood UA Negative      Nitrite, UA Negative      Urobilinogen, UA Negative      Leukocytes, UA Negative      Narrative:     Specimen Source->Urine   TROPONIN I - Abnormal    Troponin I 1.523 (*)    CULTURE, RESPIRATORY   AFB CULTURE & SMEAR   CULTURE, FUNGUS   LACTIC ACID, PLASMA    Lactate (Lactic Acid) 1.0     B-TYPE NATRIURETIC PEPTIDE    BNP 39     FUNGITELL ASSAY FOR (1.3)-B-D-GLUCANS   POCT GLUCOSE MONITORING CONTINUOUS        All Lab Results:  Results for orders placed or performed during the hospital encounter of 08/04/24   CBC auto differential   Result Value Ref Range    WBC 5.55 3.90 - 12.70 K/uL    RBC 4.23 4.00 - 5.40 M/uL    Hemoglobin 13.2 12.0 - 16.0 g/dL    Hematocrit 38.5 37.0 - 48.5 %    MCV 91 82 - 98 fL    MCH 31.2 (H) 27.0 - 31.0 pg    MCHC 34.3 32.0 - 36.0 g/dL    RDW 12.3 11.5 - 14.5 %    Platelets 204 150 - 450 K/uL    MPV 9.8 9.2 - 12.9 fL    Immature Granulocytes 0.7 (H) 0.0 - 0.5 %    Gran # (ANC) 5.4 1.8 - 7.7 K/uL    Immature Grans (Abs) 0.04 0.00 - 0.04 K/uL    Lymph # 0.1 (L) 1.0 - 4.8 K/uL    Mono # 0.0 (L) 0.3 - 1.0 K/uL    Eos # 0.0 0.0 - 0.5 K/uL    Baso # 0.01 0.00 - 0.20 K/uL    nRBC 0 0 /100 WBC    Gran % 96.3 (H) 38.0 - 73.0 %    Lymph % 2.2 (L) 18.0 - 48.0 %    Mono % 0.4 (L) 4.0 - 15.0 %    Eosinophil % 0.2 0.0 - 8.0 %    Basophil % 0.2 0.0 - 1.9 %    Differential Method Automated    Comprehensive metabolic panel   Result Value Ref Range    Sodium 126 (L) 136 - 145 mmol/L    Potassium 4.1 3.5 - 5.1 mmol/L    Chloride 93 (L) 95 - 110 mmol/L    CO2 21 (L) 23 - 29 mmol/L    Glucose 156 (H) 70 - 110 mg/dL    BUN 12 8 - 23 mg/dL    Creatinine 0.8 0.5 - 1.4 mg/dL    Calcium 8.5 (L) 8.7 - 10.5 mg/dL    Total Protein 6.9 6.0 - 8.4 g/dL    Albumin 3.5 3.5 - 5.2 g/dL    Total Bilirubin 1.2 (H) 0.1 - 1.0 mg/dL    Alkaline Phosphatase 73 55 -  135 U/L    AST 37 10 - 40 U/L    ALT 32 10 - 44 U/L    eGFR >60 >60 mL/min/1.73 m^2    Anion Gap 12 8 - 16 mmol/L   Lactic acid, plasma #1   Result Value Ref Range    Lactate (Lactic Acid) 1.0 0.5 - 2.2 mmol/L   Urinalysis, Reflex to Urine Culture Urine, Clean Catch    Specimen: Urine   Result Value Ref Range    Specimen UA Urine, Clean Catch     Color, UA Colorless (A) Yellow, Straw, Mimi    Appearance, UA Clear Clear    pH, UA 7.0 5.0 - 8.0    Specific Gravity, UA 1.030 1.005 - 1.030    Protein, UA Negative Negative    Glucose, UA Negative Negative    Ketones, UA 1+ (A) Negative    Bilirubin (UA) Negative Negative    Occult Blood UA Negative Negative    Nitrite, UA Negative Negative    Urobilinogen, UA Negative <2.0 EU/dL    Leukocytes, UA Negative Negative   Brain natriuretic peptide   Result Value Ref Range    BNP 39 0 - 99 pg/mL   Troponin I   Result Value Ref Range    Troponin I 1.523 (H) 0.000 - 0.026 ng/mL   Lactic acid, plasma #2   Result Value Ref Range    Lactate (Lactic Acid) 0.9 0.5 - 2.2 mmol/L   Troponin I   Result Value Ref Range    Troponin I 1.238 (H) 0.000 - 0.026 ng/mL   Procalcitonin   Result Value Ref Range    Procalcitonin 14.21 (H) <0.25 ng/mL   Troponin I   Result Value Ref Range    Troponin I 0.731 (H) 0.000 - 0.026 ng/mL   CBC Auto Differential   Result Value Ref Range    WBC 2.56 (L) 3.90 - 12.70 K/uL    RBC 3.92 (L) 4.00 - 5.40 M/uL    Hemoglobin 12.3 12.0 - 16.0 g/dL    Hematocrit 36.4 (L) 37.0 - 48.5 %    MCV 93 82 - 98 fL    MCH 31.4 (H) 27.0 - 31.0 pg    MCHC 33.8 32.0 - 36.0 g/dL    RDW 12.5 11.5 - 14.5 %    Platelets 209 150 - 450 K/uL    MPV 10.1 9.2 - 12.9 fL    Immature Granulocytes 0.4 0.0 - 0.5 %    Gran # (ANC) 2.3 1.8 - 7.7 K/uL    Immature Grans (Abs) 0.01 0.00 - 0.04 K/uL    Lymph # 0.2 (L) 1.0 - 4.8 K/uL    Mono # 0.1 (L) 0.3 - 1.0 K/uL    Eos # 0.0 0.0 - 0.5 K/uL    Baso # 0.01 0.00 - 0.20 K/uL    nRBC 0 0 /100 WBC    Gran % 90.6 (H) 38.0 - 73.0 %    Lymph % 5.9 (L)  18.0 - 48.0 %    Mono % 2.7 (L) 4.0 - 15.0 %    Eosinophil % 0.0 0.0 - 8.0 %    Basophil % 0.4 0.0 - 1.9 %    Differential Method Automated    Comprehensive metabolic panel   Result Value Ref Range    Sodium 127 (L) 136 - 145 mmol/L    Potassium 4.8 3.5 - 5.1 mmol/L    Chloride 94 (L) 95 - 110 mmol/L    CO2 22 (L) 23 - 29 mmol/L    Glucose 296 (H) 70 - 110 mg/dL    BUN 9 8 - 23 mg/dL    Creatinine 0.8 0.5 - 1.4 mg/dL    Calcium 8.0 (L) 8.7 - 10.5 mg/dL    Total Protein 5.6 (L) 6.0 - 8.4 g/dL    Albumin 3.0 (L) 3.5 - 5.2 g/dL    Total Bilirubin 0.9 0.1 - 1.0 mg/dL    Alkaline Phosphatase 69 55 - 135 U/L    AST 40 10 - 40 U/L    ALT 36 10 - 44 U/L    eGFR >60 >60 mL/min/1.73 m^2    Anion Gap 11 8 - 16 mmol/L   EKG 12-lead   Result Value Ref Range    QRS Duration 88 ms    [facility] QTC Calculation 450 ms         Imaging Results:  Imaging Results              CTA Chest Non-Coronary (PE Studies) (Final result)  Result time 08/04/24 21:08:12      Final result by Lee Marrero MD (08/04/24 21:08:12)                   Impression:      No pulmonary embolism.  No dissection.    Moderate tree in bud opacities consistent with endobronchial infection.  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  Correlate clinically to chronic DORI infection.    All CT scans   are performed using dose optimization techniques including the following: automated exposure control; adjustment of the mA and/or kV; use of iterative reconstruction technique.  Dose modulation was employed for ALARA by means of: Automated exposure control; adjustment of the mA and/or kV according to patient size (this includes techniques or standardized protocols for targeted exams where dose is matched to indication/reason for exam; i.e. extremities or head); and/or use of iterative reconstructive technique.      Electronically signed by: Lee Marrero  Date:    08/04/2024  Time:    21:08               Narrative:    EXAMINATION:  CTA CHEST NON CORONARY (PE  STUDIES)    CLINICAL HISTORY:  Pulmonary embolism (PE) suspected, high prob;    TECHNIQUE:  Low dose axial images, sagittal and coronal reformations were obtained from the thoracic inlet to the lung bases following the IV administration of 100 mL of Omnipaque 350.  Contrast timing was optimized to evaluate the pulmonary arteries.  MIP images were performed.    COMPARISON:  None    FINDINGS:  No evidence for pulmonary embolism.  No evidence for aortic dissection or aneurysm.  Cardiovascular structures have a normal non gated appearance.  No acute osseous injury    Tree in bud opacities consistent with endobronchial infection. 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    Mild motion artifacts.  Subpleural nodular opacities likely related to infectious process.                                       X-Ray Chest AP Portable (Final result)  Result time 08/04/24 19:57:58      Final result by Lee Marrero MD (08/04/24 19:57:58)                   Impression:      No acute abnormality.  Chronic changes similar to prior exam.  Stable exam      Electronically signed by: Lee Marrero  Date:    08/04/2024  Time:    19:57               Narrative:    EXAMINATION:  XR CHEST AP PORTABLE    CLINICAL HISTORY:  Cough;    TECHNIQUE:  Single frontal view of the chest was performed.    COMPARISON:  Prior    FINDINGS:  No pleural effusion or pneumothorax.  Coarse interstitial lung markings with hyperexpansion of the lungs similar to prior exam.  Atherosclerotic changes.    The cardiac silhouette is normal in size. The hilar and mediastinal contours are unremarkable.    Bones are intact.                                       The EKG was ordered, reviewed, and independently interpreted by the ED provider.  Interpretation time: 18:45  Rate: 93 BPM  Rhythm: normal sinus rhythm  Interpretation: No acute ST changes. No STEMI.           The Emergency Provider reviewed the vital signs and test results, which are outlined above.      ED Discussion     8:00 PM: Dr. Cardoza transfers care of patient to Dr. Cintron pending lab and imaging results.    8:16 PM: Dr. Cintron agrees with Dr. Cardoza's assessment and plan of care. Evaluated pt. Pt is resting comfortably and is in no acute distress.  D/w pt all pertinent results. D/w pt any concerns expressed at this time. Answered all questions. Pt expresses understanding at this time.    9:57 PM: Discussed case with Irene Rutledge MD ([facility]). Dr. Rutledge agrees with current care and management of pt and accepts admission.   Admitting Service: [facility]  Admitting Physician: Dr. Rutledge  Admit to: Inpatient Tele    9:57 PM: Re-evaluated pt. I have discussed test results, shared treatment plan, and the need for admission with patient and family at bedside. Pt and family express understanding at this time and agree with all information. All questions answered. Pt and family have no further questions or concerns at this time. Pt is ready for admit.      ED Course as of 08/05/24 0503   Mon Aug 05, 2024   0502 DDx includes COVID, PNA, ACS, HF, CA [BA]      ED Course User Index  [BA] Phuc Cintron MD     Medical Decision Making  Amount and/or Complexity of Data Reviewed  Labs: ordered. Decision-making details documented in ED Course.  Radiology: ordered. Decision-making details documented in ED Course.  ECG/medicine tests: ordered and independent interpretation performed. Decision-making details documented in ED Course.    Risk  Prescription drug management.  Decision regarding hospitalization.                ED Medication(s):  Medications   albuterol inhaler 2 puff (has no administration in time range)   levothyroxine tablet 125 mcg (has no administration in time range)   pantoprazole EC tablet 40 mg (has no administration in time range)   pravastatin tablet 40 mg (has no administration in time range)   sodium chloride 0.9% flush 10 mL (has no administration in time range)   naloxone 0.4 mg/mL injection  0.02 mg (has no administration in time range)   glucose chewable tablet 16 g (has no administration in time range)   glucose chewable tablet 24 g (has no administration in time range)   glucagon (human recombinant) injection 1 mg (has no administration in time range)   0.9%  NaCl infusion ( Intravenous New Bag 8/4/24 2344)   acetaminophen tablet 650 mg (has no administration in time range)   ondansetron injection 4 mg (has no administration in time range)   hydrALAZINE injection 10 mg (has no administration in time range)   enoxaparin injection 40 mg (40 mg Subcutaneous Given 8/4/24 2338)   insulin aspart U-100 pen 0-5 Units (has no administration in time range)   dextrose 10% bolus 125 mL 125 mL (has no administration in time range)   dextrose 10% bolus 250 mL 250 mL (has no administration in time range)   dexAMETHasone tablet 6 mg (6 mg Oral Given 8/4/24 2336)   cefTRIAXone (ROCEPHIN) 2 g in D5W 100 mL IVPB (MB+) (0 g Intravenous Stopped 8/5/24 0323)   azithromycin (ZITHROMAX) 500 mg in D5W 250 mL IVPB (Vial-Mate) (0 mg Intravenous Stopped 8/5/24 0242)   iohexoL (OMNIPAQUE 350) injection 100 mL (100 mLs Intravenous Given 8/4/24 2054)   aspirin tablet 325 mg (325 mg Oral Given 8/4/24 2336)       Current Discharge Medication List                  Scribe Attestation:   Scribe #1: I performed the above scribed service and the documentation accurately describes the services I performed. I attest to the accuracy of the note.     Attending:   Physician Attestation Statement for Scribe #1: I, Garcia Cardoza MD, personally performed the services described in this documentation, as scribed by Amalia Doherty, in my presence, and it is both accurate and complete.       Scribe Attestation:   Scribe #2: I performed the above scribed service and the documentation accurately describes the services I performed. I attest to the accuracy of the note.    Attending Attestation:           Physician Attestation for  Scribe:    Physician Attestation Statement for Scribe #2: I, Phuc Cintron MD, reviewed documentation, as scribed by Nisreen Nguyen in my presence, and it is both accurate and complete. I also acknowledge and confirm the content of the note done by Scribe #1.           Clinical Impression       ICD-10-CM ICD-9-CM   1. Elevated troponin  R79.89 790.6   2. COVID-19  U07.1 079.89   3. Abnormal CT of the chest  R93.89 793.2   4. Chest pain  R07.9 786.50   5. Elevated troponin I level  R79.89 790.6       Disposition:   Disposition: Admitted  Condition: Fair         Phuc Cintron MD  08/05/24 0509

## 2024-08-05 NOTE — PLAN OF CARE
POC reviewed with pt. Pt verbalizes understanding of POC. No questions at this time.  AAOx4. NADN.  NSR on cardiac monitor.  Pt remains free of falls.  No complaints at this time.  Safety measures in place. Will continue to monitor. Patient wearing non-skid footwear.  Informed pt to call for assistance before getting up. Pt verbalizes understanding.  Hourly rounding and chart check complete.   Bed in lowest position, wheels locked, side rails up x2.     Writer discusses AVS with patient. New medications discussed, no questions. Patient's PCP was added to the AVS, PCP through Dr. Chalino Coronado. Patient will call to make appt for follow up. Patient's PIVs were removed. Patient left via wheelchair with transport to [facility] for cab voucher for ride home.

## 2024-08-05 NOTE — ASSESSMENT & PLAN NOTE
Underlying Bronchiectasis :unknown to patient  No prior imaging to collaborate  Sputum gram stain and culture  Abx: Ceftriaxone + AZITHRO: transition to PO Abx 10  days LEVAQUIN  Followup in pulmonary 4 weeks: CXR and repeat CT chest 8 weeks  Acc apella

## 2024-08-05 NOTE — CONSULTS
[facility] - Tele[facility] ([facility])  Pulmonology  Consult Note    Patient Name: Nehal Jimenez  MRN: [id number]  Admission Date: 8/4/2024  Hospital Length of Stay: 1 days  Code Status: Full Code  Attending Physician: Meagan Hernandez MD  Primary Care Provider: Cory Gibbons MD   Principal Problem: COVID-19    Consults  Subjective:     HPI:  Nehal Jimenez is 79 y.o.  Asked to see for abn chest CT and respiratory infection: Covid  Saturday: seen in urgent care body care, nasal congestion  Cough, yellow expectorated sputum  COVID SARS +ve started on Albuterol, PAXLOVID and Flonase  Was asked to go to ER after CXR was read abnormal  Denied fever, No hemoptysis  No chest pain, No SOB   On RA  Most constutuional symptoms resolved after taking 1st dose of Paxlovid  Her Troponin was elevated Procalcitonin 14.6, Na 126    No sputum resulted: Blood cultures -ve to date      Past Medical History:   Diagnosis Date    Diabetes mellitus     Hypercholesteremia     Hypertension     Hypothyroidism     Osteopenia     Endocrinologist treating       Past Surgical History:   Procedure Laterality Date    BREAST BIOPSY      BREAST CYST ASPIRATION      COLONOSCOPY  2016    [facility]    ENDOSCOPY         Review of patient's allergies indicates:   Allergen Reactions    Adhesive tape-silicones     Latex, natural rubber        Family History       Problem Relation (Age of Onset)    Colon cancer Father (86)    Hodgkin's lymphoma Father          Tobacco Use    Smoking status: Never    Smokeless tobacco: Never   Substance and Sexual Activity    Alcohol use: Never    Drug use: Never    Sexual activity: Not Currently     Partners: Male     Birth control/protection: None         Review of Systems   Constitutional:  Negative for fatigue.   Respiratory:  Negative for cough, shortness of breath and stridor.      Objective:     Vital Signs (Most Recent):  Temp: 98.3 °F (36.8 °C) (08/05/24 1144)  Pulse: 75 (08/05/24 1144)  Resp: 18 (08/05/24 1144)  BP: 121/71  (08/05/24 1144)  SpO2: 95 % (08/05/24 1144) Vital Signs (24h Range):  Temp:  [97.5 °F (36.4 °C)-99.3 °F (37.4 °C)] 98.3 °F (36.8 °C)  Pulse:  [66-96] 75  Resp:  [17-30] 18  SpO2:  [92 %-96 %] 95 %  BP: (108-146)/(62-72) 121/71     Weight: 52.6 kg (116 lb)  Body mass index is 17.13 kg/m².    No intake or output data in the 24 hours ending 08/05/24 1333     Physical Exam  Vitals and nursing note reviewed.   Constitutional:       Appearance: She is well-developed.   HENT:      Head: Normocephalic and atraumatic.      Nose: Nose normal.      Mouth/Throat:      Pharynx: No oropharyngeal exudate.   Eyes:      General: No scleral icterus.     Conjunctiva/sclera: Conjunctivae normal.      Pupils: Pupils are equal, round, and reactive to light.   Neck:      Thyroid: No thyromegaly.      Vascular: No JVD.      Trachea: No tracheal deviation.   Cardiovascular:      Rate and Rhythm: Normal rate and regular rhythm.      Heart sounds: Normal heart sounds, S1 normal and S2 normal. No murmur heard.  Pulmonary:      Effort: Pulmonary effort is normal. No tachypnea, accessory muscle usage or respiratory distress.      Breath sounds: Normal breath sounds. No stridor.   Abdominal:      General: Bowel sounds are normal. There is no distension.      Palpations: Abdomen is soft. There is no hepatomegaly, splenomegaly or mass.      Tenderness: There is no abdominal tenderness. There is no guarding or rebound.   Musculoskeletal:         General: No tenderness. Normal range of motion.      Cervical back: Normal range of motion and neck supple.   Lymphadenopathy:      Upper Body:      Right upper body: No supraclavicular adenopathy.      Left upper body: No supraclavicular adenopathy.   Skin:     General: Skin is warm and dry.      Findings: No rash.      Nails: There is no clubbing.   Neurological:      Mental Status: She is alert and oriented to person, place, and time.      Coordination: Coordination normal.      Gait: Gait normal.       "Deep Tendon Reflexes: Reflexes are normal and symmetric.          Vents:       Lines/Drains/Airways       Peripheral Intravenous Line  Duration                  Peripheral IV - Single Lumen 08/04/24 1936 20 G Left Antecubital <1 day                    Significant Labs:    CBC/Anemia Profile:  Recent Labs   Lab 08/04/24 1934 08/05/24  0323   WBC 5.55 2.56*   HGB 13.2 12.3   HCT 38.5 36.4*    209   MCV 91 93   RDW 12.3 12.5        Chemistries:  Recent Labs   Lab 08/04/24 1934 08/05/24  0323   * 127*   K 4.1 4.8   CL 93* 94*   CO2 21* 22*   BUN 12 9   CREATININE 0.8 0.8   CALCIUM 8.5* 8.0*   ALBUMIN 3.5 3.0*   PROT 6.9 5.6*   BILITOT 1.2* 0.9   ALKPHOS 73 69   ALT 32 36   AST 37 40       Cardiac Markers: No results for input(s): "CKMB", "TROPONINT", "MYOGLOBIN" in the last 48 hours.  Respiratory Culture: No results for input(s): "GSRESP", "RESPIRATORYC" in the last 48 hours.  Troponin:   Recent Labs   Lab 08/04/24 1934 08/04/24  2330 08/05/24  0323   TROPONINI 1.523* 1.238* 0.731*     All pertinent labs within the past 24 hours have been reviewed.    Significant Imaging:   I have reviewed all pertinent imaging results/findings within the past 24 hours.    Echo    Left Ventricle: The left ventricle is normal in size. Normal wall   thickness. There is concentric remodeling. There is normal systolic   function. Ejection fraction by visual approximation is 55%. There is   normal diastolic function.    Right Ventricle: Normal right ventricular cavity size. Wall thickness   is normal. Systolic function is normal.    IVC/SVC: Normal venous pressure at 3 mmHg.       EXAMINATION:  CTA CHEST NON CORONARY (PE STUDIES)     CLINICAL HISTORY:  Pulmonary embolism (PE) suspected, high prob;     TECHNIQUE:  Low dose axial images, sagittal and coronal reformations were obtained from the thoracic inlet to the lung bases following the IV administration of 100 mL of Omnipaque 350.  Contrast timing was optimized to evaluate " the pulmonary arteries.  MIP images were performed.     COMPARISON:  None     FINDINGS:  No evidence for pulmonary embolism.  No evidence for aortic dissection or aneurysm.  Cardiovascular structures have a normal non gated appearance.  No acute osseous injury     Tree in bud opacities consistent with endobronchial infection. 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     Mild motion artifacts.  Subpleural nodular opacities likely related to infectious process.     Impression:     No pulmonary embolism.  No dissection.     Moderate tree in bud opacities consistent with endobronchial infection.  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  Correlate clinically to chronic DORI infection.     All CT scans   are performed using dose optimization techniques including the following: automated exposure control; adjustment of the mA and/or kV; use of iterative reconstruction technique.  Dose modulation was employed for ALARA by means of: Automated exposure control; adjustment of the mA and/or kV according to patient size (this includes techniques or standardized protocols for targeted exams where dose is matched to indication/reason for exam; i.e. extremities or head); and/or use of iterative reconstructive technique.     Component      Latest Ref Rng 8/4/2024   Sodium      136 - 145 mmol/L 126 (L)    Potassium      3.5 - 5.1 mmol/L 4.1    Chloride      95 - 110 mmol/L 93 (L)    CO2      23 - 29 mmol/L 21 (L)    Glucose      70 - 110 mg/dL 156 (H)    BUN      8 - 23 mg/dL 12    Creatinine      0.5 - 1.4 mg/dL 0.8    Calcium      8.7 - 10.5 mg/dL 8.5 (L)    PROTEIN TOTAL      6.0 - 8.4 g/dL 6.9    Albumin      3.5 - 5.2 g/dL 3.5    BILIRUBIN TOTAL      0.1 - 1.0 mg/dL 1.2 (H)    ALP      55 - 135 U/L 73    AST      10 - 40 U/L 37    ALT      10 - 44 U/L 32    eGFR      >60 mL/min/1.73 m^2 >60    Anion Gap      8 - 16 mmol/L 12    Lactic Acid Level      0.5 - 2.2 mmol/L 1.0    BNP      0 - 99 pg/mL 39   "  Troponin I      0.000 - 0.026 ng/mL 1.238 (H)    Procalcitonin      <0.25 ng/mL 14.21 (H)       Legend:  (L) Low  (H) High    ABG  No results for input(s): "PH", "PO2", "PCO2", "HCO3", "BE" in the last 168 hours.  Assessment/Plan:     Pulmonary  Right middle lobe syndrome  Underlying Bronchiectasis :unknown to patient  No prior imaging to collaborate  Sputum gram stain and culture  Abx: Ceftriaxone + AZITHRO: transition to PO Abx 10  days LEVAQUIN  Followup in pulmonary 4 weeks: CXR and repeat CT chest 8 weeks  Acc apella      Adult bronchiectasis  Suggestive of [place] syndrome    Followup in office    PFT  Nebulizer  Acapella  Hypertonic saline nebs    May need bronch as outpatient is expectorated sputum not revealing      Cardiac/Vascular  Elevated troponin  Work up per cardiology    ID  * COVID-19  Patient is identified as       Afebrile  On RA  PO Dexamethasone    Not candidate for REMDESIVIR    Outpatient PAXLOVID    Initiate standard COVID protocols; COVID-19 testing ,Infection Control notification  and isolation- respiratory, contact and droplet per protocol    Diagnostics: CBC, CMP, Ferritin, CRP, and Portable CXR    Management: Inhaled bronchodilators as needed for shortness of breath.    Advance Care Planning Current advance care plan has not been discussed with patient/family/POA and patient currently wishes Full Code.     Endocrine  Hyponatremia  Hyponatremia is likely due to COVID, Bronchiectasis. The patient's most recent sodium results are listed below.  Recent Labs     08/04/24  1934 08/05/24  0323   * 127*     Plan  - Correct the sodium by 4-6mEq in 24 hours.   - Obtain the following studies:    - Will treat the hyponatremia with Removal of offending medications  - Monitor sodium Daily.   - Patient hyponatremia is improving  -            Thank you for your consult. I will follow-up with patient. Please contact us if you have any additional questions.     Brian Aldrich, " MD  Pulmonology  [facility] - Telemetry ([facility])

## 2024-08-05 NOTE — HPI
79-year-old white woman with history of non-insulin-dependent diabetes mellitus type 2, hypertension, hyperlipidemia, hypothyroidism, admitted for covid pneumonia and hypoxemia.  Pt also with mild increase in troponin peak 1.5 .Patient denies CP, angina or anginal equivalent.Pt active at home before this episode, aerobics 3x week. EKG is normal.

## 2024-08-05 NOTE — ASSESSMENT & PLAN NOTE
Suggestive of [place] syndrome    Followup in office    PFT  Nebulizer  Acapella  Hypertonic saline nebs    May need bronch as outpatient is expectorated sputum not revealing

## 2024-08-06 VITALS
HEIGHT: 69 IN | SYSTOLIC BLOOD PRESSURE: 113 MMHG | DIASTOLIC BLOOD PRESSURE: 68 MMHG | WEIGHT: 121.69 LBS | BODY MASS INDEX: 18.02 KG/M2 | RESPIRATION RATE: 18 BRPM | OXYGEN SATURATION: 93 % | TEMPERATURE: 98 F | HEART RATE: 76 BPM

## 2024-08-06 PROBLEM — R79.89 ELEVATED TROPONIN: Status: RESOLVED | Noted: 2024-08-04 | Resolved: 2024-08-06

## 2024-08-06 PROBLEM — J98.19 RIGHT MIDDLE LOBE SYNDROME: Status: RESOLVED | Noted: 2024-08-05 | Resolved: 2024-08-06

## 2024-08-06 PROBLEM — U07.1 COVID-19: Status: RESOLVED | Noted: 2024-08-04 | Resolved: 2024-08-06

## 2024-08-06 PROBLEM — E87.1 HYPONATREMIA: Status: RESOLVED | Noted: 2024-08-04 | Resolved: 2024-08-06

## 2024-08-06 LAB
ANION GAP SERPL CALC-SCNC: 9 MMOL/L (ref 8–16)
BASOPHILS # BLD AUTO: 0.01 K/UL (ref 0–0.2)
BASOPHILS NFR BLD: 0.2 % (ref 0–1.9)
BUN SERPL-MCNC: 16 MG/DL (ref 8–23)
CALCIUM SERPL-MCNC: 8.1 MG/DL (ref 8.7–10.5)
CHLORIDE SERPL-SCNC: 97 MMOL/L (ref 95–110)
CO2 SERPL-SCNC: 25 MMOL/L (ref 23–29)
CREAT SERPL-MCNC: 0.8 MG/DL (ref 0.5–1.4)
DIFFERENTIAL METHOD BLD: ABNORMAL
EOSINOPHIL # BLD AUTO: 0 K/UL (ref 0–0.5)
EOSINOPHIL NFR BLD: 0 % (ref 0–8)
ERYTHROCYTE [DISTWIDTH] IN BLOOD BY AUTOMATED COUNT: 12.1 % (ref 11.5–14.5)
EST. GFR  (NO RACE VARIABLE): >60 ML/MIN/1.73 M^2
GLUCOSE SERPL-MCNC: 283 MG/DL (ref 70–110)
HCT VFR BLD AUTO: 35.6 % (ref 37–48.5)
HGB BLD-MCNC: 12.2 G/DL (ref 12–16)
IMM GRANULOCYTES # BLD AUTO: 0.04 K/UL (ref 0–0.04)
IMM GRANULOCYTES NFR BLD AUTO: 0.9 % (ref 0–0.5)
LYMPHOCYTES # BLD AUTO: 0.6 K/UL (ref 1–4.8)
LYMPHOCYTES NFR BLD: 12.4 % (ref 18–48)
MCH RBC QN AUTO: 31.3 PG (ref 27–31)
MCHC RBC AUTO-ENTMCNC: 34.3 G/DL (ref 32–36)
MCV RBC AUTO: 91 FL (ref 82–98)
MONOCYTES # BLD AUTO: 0.2 K/UL (ref 0.3–1)
MONOCYTES NFR BLD: 3.7 % (ref 4–15)
NEUTROPHILS # BLD AUTO: 3.8 K/UL (ref 1.8–7.7)
NEUTROPHILS NFR BLD: 82.8 % (ref 38–73)
NRBC BLD-RTO: 0 /100 WBC
PLATELET # BLD AUTO: 230 K/UL (ref 150–450)
PMV BLD AUTO: 10.9 FL (ref 9.2–12.9)
POCT GLUCOSE: 247 MG/DL (ref 70–110)
POCT GLUCOSE: 369 MG/DL (ref 70–110)
POTASSIUM SERPL-SCNC: 4.2 MMOL/L (ref 3.5–5.1)
RBC # BLD AUTO: 3.9 M/UL (ref 4–5.4)
SODIUM SERPL-SCNC: 131 MMOL/L (ref 136–145)
WBC # BLD AUTO: 4.61 K/UL (ref 3.9–12.7)

## 2024-08-06 PROCEDURE — 99232 SBSQ HOSP IP/OBS MODERATE 35: CPT | Mod: ,,, | Performed by: INTERNAL MEDICINE

## 2024-08-06 PROCEDURE — 94761 N-INVAS EAR/PLS OXIMETRY MLT: CPT

## 2024-08-06 PROCEDURE — 94799 UNLISTED PULMONARY SVC/PX: CPT

## 2024-08-06 PROCEDURE — 36415 COLL VENOUS BLD VENIPUNCTURE: CPT | Performed by: EMERGENCY MEDICINE

## 2024-08-06 PROCEDURE — 63600175 PHARM REV CODE 636 W HCPCS: Performed by: EMERGENCY MEDICINE

## 2024-08-06 PROCEDURE — 25000003 PHARM REV CODE 250: Performed by: INTERNAL MEDICINE

## 2024-08-06 PROCEDURE — 63600175 PHARM REV CODE 636 W HCPCS: Performed by: INTERNAL MEDICINE

## 2024-08-06 PROCEDURE — 85025 COMPLETE CBC W/AUTO DIFF WBC: CPT | Performed by: EMERGENCY MEDICINE

## 2024-08-06 PROCEDURE — 25000003 PHARM REV CODE 250: Performed by: EMERGENCY MEDICINE

## 2024-08-06 PROCEDURE — 80048 BASIC METABOLIC PNL TOTAL CA: CPT | Performed by: EMERGENCY MEDICINE

## 2024-08-06 RX ORDER — LEVOFLOXACIN 500 MG/1
500 TABLET, FILM COATED ORAL DAILY
Qty: 10 TABLET | Refills: 0 | Status: SHIPPED | OUTPATIENT
Start: 2024-08-06 | End: 2024-08-08

## 2024-08-06 RX ADMIN — INSULIN ASPART 5 UNITS: 100 INJECTION, SOLUTION INTRAVENOUS; SUBCUTANEOUS at 01:08

## 2024-08-06 RX ADMIN — PANTOPRAZOLE SODIUM 40 MG: 40 TABLET, DELAYED RELEASE ORAL at 09:08

## 2024-08-06 RX ADMIN — ENOXAPARIN SODIUM 40 MG: 40 INJECTION SUBCUTANEOUS at 09:08

## 2024-08-06 RX ADMIN — FUROSEMIDE 20 MG: 10 INJECTION, SOLUTION INTRAMUSCULAR; INTRAVENOUS at 09:08

## 2024-08-06 RX ADMIN — Medication 100 MG: at 09:08

## 2024-08-06 RX ADMIN — LEVOTHYROXINE SODIUM 125 MCG: 125 TABLET ORAL at 06:08

## 2024-08-06 RX ADMIN — DEXAMETHASONE 6 MG: 4 TABLET ORAL at 09:08

## 2024-08-06 RX ADMIN — THERA TABS 1 TABLET: TAB at 09:08

## 2024-08-06 RX ADMIN — CEFTRIAXONE SODIUM 2 G: 2 INJECTION, POWDER, FOR SOLUTION INTRAMUSCULAR; INTRAVENOUS at 02:08

## 2024-08-06 RX ADMIN — INSULIN ASPART 2 UNITS: 100 INJECTION, SOLUTION INTRAVENOUS; SUBCUTANEOUS at 06:08

## 2024-08-06 RX ADMIN — AZITHROMYCIN MONOHYDRATE 500 MG: 500 INJECTION, POWDER, LYOPHILIZED, FOR SOLUTION INTRAVENOUS at 01:08

## 2024-08-06 RX ADMIN — CYANOCOBALAMIN 1000 MCG: 1000 INJECTION INTRAMUSCULAR; SUBCUTANEOUS at 09:08

## 2024-08-06 RX ADMIN — Medication 1 TABLET: at 09:08

## 2024-08-06 NOTE — HOSPITAL COURSE
8-6-24 echo is normal. EKG is normal troponin is from demand ischemia. Follow up in cardiology 2-4 weeks after discharge

## 2024-08-06 NOTE — PLAN OF CARE
[facility] - Telemetry (Hospital)  Discharge Final Note    Primary Care Provider: Cory Gibbons MD    Expected Discharge Date: 8/6/2024    Final Discharge Note (most recent)       Final Note - 08/06/24 1351          Final Note    Assessment Type Final Discharge Note     Anticipated Discharge Disposition Home or Self Care        Post-Acute Status    Coverage Medicare A & B     Discharge Delays None known at this time                     Important Message from Medicare             Contact Info       Cory Gibbons MD   Specialty: Internal Medicine   Relationship: PCP - General    [facility]  [address]  [address]  [place] [address]   Phone: [phone]       Next Steps: Schedule an appointment as soon as possible for a visit in 3 day(s)    Instructions: Hospital follow up          DC Disposition: home with family  Family Notified: Patient and daughter over the phone   Transportation: personal transportation    Patient had no equipment or placement needs from .     Patient has resources to schedule hospital follow up [facility] on [facility].

## 2024-08-06 NOTE — PROGRESS NOTES
[facility] - Telemetry ([facility])  Cardiology  Progress Note    Patient Name: Nehal Jimenez  MRN: [id number]  Admission Date: 8/4/2024  Hospital Length of Stay: 2 days  Code Status: Full Code   Attending Physician: Meagan Hernandez MD   Primary Care Physician: Cory Gibbons MD  Expected Discharge Date: 8/6/2024  Principal Problem:COVID-19    Subjective:     Hospital Course:   8-6-24 echo is normal. EKG is normal troponin is from demand ischemia. Follow up in cardiology 2-4 weeks after discharge    Interval History:     Review of Systems   Constitutional: Negative. Negative for weight gain.   HENT: Negative.     Eyes: Negative.    Cardiovascular: Negative.  Negative for chest pain, leg swelling and palpitations.   Respiratory: Negative.  Negative for shortness of breath.    Endocrine: Negative.    Hematologic/Lymphatic: Negative.    Skin: Negative.    Musculoskeletal:  Negative for muscle weakness.   Gastrointestinal: Negative.    Genitourinary: Negative.    Neurological: Negative.  Negative for dizziness.   Psychiatric/Behavioral: Negative.     Allergic/Immunologic: Negative.    All other systems reviewed and are negative.    Objective:     Vital Signs (Most Recent):  Temp: 98.1 °F (36.7 °C) (08/06/24 0802)  Pulse: 71 (08/06/24 1335)  Resp: 18 (08/06/24 1148)  BP: 113/68 (08/06/24 1148)  SpO2: (!) 93 % (08/06/24 1148) Vital Signs (24h Range):  Temp:  [97.6 °F (36.4 °C)-98.8 °F (37.1 °C)] 98.1 °F (36.7 °C)  Pulse:  [66-88] 71  Resp:  [16-18] 18  SpO2:  [93 %-96 %] 93 %  BP: (108-151)/(67-74) 113/68     Weight: 55.2 kg (121 lb 11.1 oz)  Body mass index is 17.97 kg/m².     SpO2: (!) 93 %         Intake/Output Summary (Last 24 hours) at 8/6/2024 1405  Last data filed at 8/5/2024 1859  Gross per 24 hour   Intake 375.14 ml   Output --   Net 375.14 ml       Lines/Drains/Airways       Peripheral Intravenous Line  Duration                  Peripheral IV - Single Lumen 08/04/24 1936 20 G Left Antecubital 1 day                        Physical Exam       Significant Labs: All pertinent lab results from the last 24 hours have been reviewed.    Significant Imaging: X-Ray: CXR: X-Ray Chest 1 View (CXR): No results found for this visit on 08/04/24.  Assessment and Plan:     Brief HPI:     No notes have been filed under this hospital service.  Service: Cardiology      VTE Risk Mitigation (From admission, onward)           Ordered     enoxaparin injection 40 mg  Every 12 hours         08/04/24 2236     IP VTE HIGH RISK PATIENT  Once         08/04/24 2236     Place sequential compression device  Until discontinued         08/04/24 2236                    Jose Miller MD  Cardiology  [facility] - Telemetry ([facility])

## 2024-08-06 NOTE — SUBJECTIVE & OBJECTIVE
Interval History:     Review of Systems   Constitutional: Negative. Negative for weight gain.   HENT: Negative.     Eyes: Negative.    Cardiovascular: Negative.  Negative for chest pain, leg swelling and palpitations.   Respiratory: Negative.  Negative for shortness of breath.    Endocrine: Negative.    Hematologic/Lymphatic: Negative.    Skin: Negative.    Musculoskeletal:  Negative for muscle weakness.   Gastrointestinal: Negative.    Genitourinary: Negative.    Neurological: Negative.  Negative for dizziness.   Psychiatric/Behavioral: Negative.     Allergic/Immunologic: Negative.    All other systems reviewed and are negative.    Objective:     Vital Signs (Most Recent):  Temp: 98.1 °F (36.7 °C) (08/06/24 0802)  Pulse: 71 (08/06/24 1335)  Resp: 18 (08/06/24 1148)  BP: 113/68 (08/06/24 1148)  SpO2: (!) 93 % (08/06/24 1148) Vital Signs (24h Range):  Temp:  [97.6 °F (36.4 °C)-98.8 °F (37.1 °C)] 98.1 °F (36.7 °C)  Pulse:  [66-88] 71  Resp:  [16-18] 18  SpO2:  [93 %-96 %] 93 %  BP: (108-151)/(67-74) 113/68     Weight: 55.2 kg (121 lb 11.1 oz)  Body mass index is 17.97 kg/m².     SpO2: (!) 93 %         Intake/Output Summary (Last 24 hours) at 8/6/2024 1405  Last data filed at 8/5/2024 1859  Gross per 24 hour   Intake 375.14 ml   Output --   Net 375.14 ml       Lines/Drains/Airways       Peripheral Intravenous Line  Duration                  Peripheral IV - Single Lumen 08/04/24 1936 20 G Left Antecubital 1 day                       Physical Exam       Significant Labs: All pertinent lab results from the last 24 hours have been reviewed.    Significant Imaging: X-Ray: CXR: X-Ray Chest 1 View (CXR): No results found for this visit on 08/04/24.

## 2024-08-07 ENCOUNTER — NURSE TRIAGE (OUTPATIENT)
Dept: ADMINISTRATIVE | Facility: CLINIC | Age: 79
End: 2024-08-07
Payer: MEDICARE

## 2024-08-07 LAB
1,3 BETA GLUCAN SER-MCNC: <31 PG/ML
FUNGITELL COMMENTS: NEGATIVE

## 2024-08-08 ENCOUNTER — TELEPHONE (OUTPATIENT)
Dept: PULMONOLOGY | Facility: CLINIC | Age: 79
End: 2024-08-08
Payer: MEDICARE

## 2024-08-08 DIAGNOSIS — J18.9 PNEUMONIA OF RIGHT MIDDLE LOBE DUE TO INFECTIOUS ORGANISM: Primary | ICD-10-CM

## 2024-08-08 DIAGNOSIS — J18.9 PNEUMONIA OF RIGHT MIDDLE LOBE DUE TO INFECTIOUS ORGANISM: ICD-10-CM

## 2024-08-08 DIAGNOSIS — J47.9 ADULT BRONCHIECTASIS: Primary | ICD-10-CM

## 2024-08-08 RX ORDER — AMOXICILLIN AND CLAVULANATE POTASSIUM 875; 125 MG/1; MG/1
1 TABLET, FILM COATED ORAL 2 TIMES DAILY
Qty: 20 TABLET | Refills: 0 | Status: SHIPPED | OUTPATIENT
Start: 2024-08-08 | End: 2024-08-18

## 2024-08-09 ENCOUNTER — TELEPHONE (OUTPATIENT)
Dept: CARDIOLOGY | Facility: HOSPITAL | Age: 79
End: 2024-08-09
Payer: MEDICARE

## 2024-08-09 NOTE — TELEPHONE ENCOUNTER
Contacted pt and got her scheduled for a HFU on 08/30/24 w/ C. EMILIE Canales    ---- SHAE Kim 08/09/24 12:16 PM ----  Please phone patient. Needs two week f/u with Michelle Canales NP.    Thanks

## 2024-08-10 LAB
BACTERIA BLD CULT: NORMAL
BACTERIA BLD CULT: NORMAL

## 2024-08-10 NOTE — DISCHARGE SUMMARY
[facility]  Discharge Summary      Patient Name: Nehal Jimenez  MRN: [id number]  [facility]: [id number]  Patient Class: IP- Inpatient  Admission Date: 8/4/2024  Hospital Length of Stay: 2 days  Discharge Date and Time: 8/6/2024  3:42 PM  Attending Physician: No att. providers found   Discharging Provider: Meagan Hernandez MD  Primary Care Provider: Cory Gibbons MD    Primary Care Team: Networked reference to record PCT     HPI:   79-year-old white woman with history of non-insulin-dependent diabetes mellitus type 2, hypertension, hyperlipidemia, hypothyroidism, urinary incontinence, and osteoporosis who originally went to urgent care today with complaint of rhinorrhea and cough and was diagnosed with COVID.  She had an abnormal chest x-ray with right middle lobe lung nodule and O2 saturations of 90-91% on room air and was sent to the emergency department.  Patient reports she has had upper respiratory infection symptoms over the last several days, constant, mild to moderate severity.  She has had no associated chest pain, shortness for breath, palpitations, nausea, vomiting, fevers, chills, diarrhea, constipation, or abdominal pain.  Her cough is nonproductive.  Abnormal labs in our emergency department include sodium 126, glucose 156, and troponin 1.523.  Patient denies any prior cardiac history.    * No surgery found *      Hospital Course:   79-year-old white woman with hx DM2, HTN, HLP, hypothyroidism, urinary incontinence, and osteoporosis sent from  to ER yesterday with c/o rhinorrhea and cough and was diagnosed with COVID.  She had an abnormal chest x-ray with RML nodule and O2 saturations of 90-91% on room air. Patient reports she has had upper respiratory infection symptoms over the last several days, constant, mild to moderate severity.  She has had no associated chest pain, shortness for breath, palpitations, nausea, vomiting, fevers, chills, diarrhea, constipation, or  abdominal pain.  Her cough is nonproductive.  Abnormal labs in our emergency department include sodium 126, glucose 156, and troponin 1.523.  Patient denies any prior cardiac history.    8/5- Appreciate Cards and Pulm input. Looks and feels better, no CP or SOB, no fever, chills, getting Dexa plus IV abx. IS added. Troponin coming down, and cards thinks that the elevated Trop due to demand ischemia. Pulm also cleared her as Covid Pneumonia or possible [place] Synd. Na 127 now, d/c IVF, given Lasix 20 mg IV. Will add IS to present care and may d/c her soon.     8/6- looks and feels much better, stronger, breathing easier, eating drinking well. Cough, SOB much improved. Good response to Dexamethasone plus Remdesivir. Wishes to go home. Sharla Aldrich and Paul also examined and cleared her, elevated troponin likely from Demand Ischemia. Her Sats remained 96% on RA and she did not need any O2. She was seen and examined and deemed stable for discharge home today.        Goals of Care Treatment Preferences:  Code Status: Full Code         Consults:   Consults (From admission, onward)          Status Ordering Provider     Inpatient consult to Pulmonology  Once        Provider:  Brian Aldrich MD    Completed AUGUSTUS MADDOX     Inpatient consult to Cardiology  Once        Provider:  Jose Miller MD    Completed AUGUSTUS MADDOX            No new Assessment & Plan notes have been filed under this hospital service since the last note was generated.  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 Schedule an appointment as soon as possible for a visit in 3 day(s).    Specialty: Internal Medicine  Why: Hospital follow up  Contact information:  [address]  [address]  [place] [address] [phone]                           Patient Instructions:      Diet diabetic     Diet Cardiac     Activity as tolerated       Significant Diagnostic Studies: Labs:     Recent Results (from the past 336 hour(s))   CBC Auto Differential    Collection Time: 08/06/24  5:10 AM   Result Value Ref Range    WBC 4.61 3.90 - 12.70 K/uL    Hemoglobin 12.2 12.0 - 16.0 g/dL    Hematocrit 35.6 (L) 37.0 - 48.5 %    Platelets 230 150 - 450 K/uL   CBC Auto Differential    Collection Time: 08/05/24  3:23 AM   Result Value Ref Range    WBC 2.56 (L) 3.90 - 12.70 K/uL    Hemoglobin 12.3 12.0 - 16.0 g/dL    Hematocrit 36.4 (L) 37.0 - 48.5 %    Platelets 209 150 - 450 K/uL   CBC auto differential    Collection Time: 08/04/24  7:34 PM   Result Value Ref Range    WBC 5.55 3.90 - 12.70 K/uL    Hemoglobin 13.2 12.0 - 16.0 g/dL    Hematocrit 38.5 37.0 - 48.5 %    Platelets 204 150 - 450 K/uL      Recent Results (from the past 336 hour(s))   Basic Metabolic Panel    Collection Time: 08/06/24  5:10 AM   Result Value Ref Range    Sodium 131 (L) 136 - 145 mmol/L    Potassium 4.2 3.5 - 5.1 mmol/L    Chloride 97 95 - 110 mmol/L    CO2 25 23 - 29 mmol/L    BUN 16 8 - 23 mg/dL    Creatinine 0.8 0.5 - 1.4 mg/dL    Calcium 8.1 (L) 8.7 - 10.5 mg/dL    Anion Gap 9 8 - 16 mmol/L         All labs within the past 24 hours have been reviewed  Microbiology: Blood Culture   Lab Results   Component Value Date    LABBLOO No growth after  "5 days. 08/04/2024    and Urine Culture  No results found for: "LABURIN"  Radiology:   Imaging Results              CTA Chest Non-Coronary (PE Studies) (Final result)  Result time 08/04/24 21:08:12      Final result by Lee Marrero MD (08/04/24 21:08:12)                   Impression:      No pulmonary embolism.  No dissection.    Moderate tree in bud opacities consistent with endobronchial infection.  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  Correlate clinically to chronic DORI infection.    All CT scans   are performed using dose optimization techniques including the following: automated exposure control; adjustment of the mA and/or kV; use of iterative reconstruction technique.  Dose modulation was employed for ALARA by means of: Automated exposure control; adjustment of the mA and/or kV according to patient size (this includes techniques or standardized protocols for targeted exams where dose is matched to indication/reason for exam; i.e. extremities or head); and/or use of iterative reconstructive technique.      Electronically signed by: Lee Marrero  Date:    08/04/2024  Time:    21:08               Narrative:    EXAMINATION:  CTA CHEST NON CORONARY (PE STUDIES)    CLINICAL HISTORY:  Pulmonary embolism (PE) suspected, high prob;    TECHNIQUE:  Low dose axial images, sagittal and coronal reformations were obtained from the thoracic inlet to the lung bases following the IV administration of 100 mL of Omnipaque 350.  Contrast timing was optimized to evaluate the pulmonary arteries.  MIP images were performed.    COMPARISON:  None    FINDINGS:  No evidence for pulmonary embolism.  No evidence for aortic dissection or aneurysm.  Cardiovascular structures have a normal non gated appearance.  No acute osseous injury    Tree in bud opacities consistent with endobronchial infection. 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    Mild motion artifacts.  Subpleural nodular opacities likely " related to infectious process.                                       X-Ray Chest AP Portable (Final result)  Result time 08/04/24 19:57:58      Final result by Lee Marrero MD (08/04/24 19:57:58)                   Impression:      No acute abnormality.  Chronic changes similar to prior exam.  Stable exam      Electronically signed by: Lee Marrero  Date:    08/04/2024  Time:    19:57               Narrative:    EXAMINATION:  XR CHEST AP PORTABLE    CLINICAL HISTORY:  Cough;    TECHNIQUE:  Single frontal view of the chest was performed.    COMPARISON:  Prior    FINDINGS:  No pleural effusion or pneumothorax.  Coarse interstitial lung markings with hyperexpansion of the lungs similar to prior exam.  Atherosclerotic changes.    The cardiac silhouette is normal in size. The hilar and mediastinal contours are unremarkable.    Bones are intact.                                     Cardiac Graphics: Echocardiogram: Transthoracic echo (TTE) complete (Cupid Only):   Results for orders placed or performed during the hospital encounter of 08/04/24   Echo   Result Value Ref Range    BSA 1.6 m2    LVOT stroke volume 64.70 cm3    LVIDd 4.02 3.5 - 6.0 cm    LV Systolic Volume 24.06 mL    LV Systolic Volume Index 14.7 mL/m2    LVIDs 2.58 2.1 - 4.0 cm    LV Diastolic Volume 70.83 mL    LV Diastolic Volume Index 43.19 mL/m2    Left Ventricular End Systolic Volume by Teichholz Method 24.06 mL    Left Ventricular End Diastolic Volume by Teichholz Method 70.83 mL    IVS 0.87 0.6 - 1.1 cm    LVOT diameter 1.94 cm    LVOT area 3.0 cm2    FS 36 28 - 44 %    Left Ventricle Relative Wall Thickness 0.49 cm    Posterior Wall 0.98 0.6 - 1.1 cm    LV mass 114.83 g    LV Mass Index 70 g/m2    MV Peak E Monty 0.82 m/s    TDI LATERAL 0.14 m/s    TDI SEPTAL 0.11 m/s    E/E' ratio 6.56 m/s    MV Peak A Monty 0.84 m/s    E/A ratio 0.98     IVRT 91.34 msec    E wave deceleration time 171.45 msec    LV SEPTAL E/E' RATIO 7.45 m/s    LV LATERAL E/E' RATIO  5.86 m/s    LVOT peak mabel 1.02 m/s    Left Ventricular Outflow Tract Mean Velocity 0.70 cm/s    Left Ventricular Outflow Tract Mean Gradient 2.21 mmHg    RVOT peak VTI 12.7 cm    TAPSE 2.04 cm    LA size 2.49 cm    Left Atrium Minor Axis 2.83 cm    Left Atrium Major Axis 5.18 cm    RA Major Axis 3.68 cm    AV mean gradient 3 mmHg    AV peak gradient 6 mmHg    Ao peak mabel 1.18 m/s    Ao VTI 26.30 cm    LVOT peak VTI 21.90 cm    AV valve area 2.46 cm²    AV Velocity Ratio 0.86     AV index (prosthetic) 0.83     AGNES by Velocity Ratio 2.55 cm²    PV mean gradient 1 mmHg    RVOT peak mabel 0.52 m/s    Ao root annulus 2.62 cm    STJ 2.78 cm    Ascending aorta 2.98 cm    IVC diameter 1.57 cm    Mean e' 0.13 m/s    ZLVIDS -0.82     ZLVIDD -1.38     LA Volume Index 11.8 mL/m2    LA volume 19.37 cm3    LA WIDTH 2.5 cm    RA Width 3.8 cm    EF 55 %    Est. RA pres 3 mmHg    Narrative      Left Ventricle: The left ventricle is normal in size. Normal wall   thickness. There is concentric remodeling. There is normal systolic   function. Ejection fraction by visual approximation is 55%. There is   normal diastolic function.    Right Ventricle: Normal right ventricular cavity size. Wall thickness   is normal. Systolic function is normal.    IVC/SVC: Normal venous pressure at 3 mmHg.         Pending Diagnostic Studies:       Procedure Component Value Units Date/Time    Fungal Immunodiffusion - Blood [[id number]] Collected: 08/05/24 1207    Order Status: Sent Lab Status: In process Updated: 08/05/24 2140    Specimen: Blood     Narrative:      Collection has been rescheduled by O at 08/05/2024 12:03 Reason:   Patient unavailable; RN starting IV           Medications:  Reconciled Home Medications:      Medication List        START taking these medications      pulse oximeter device  Commonly known as: pulse oximeter  by Apply Externally route 2 (two) times a day. Use twice daily at 8 AM and 3 PM and record the value in [place] as  directed.            CONTINUE taking these medications      calcium citrate-vitamin D3 315-200 mg 315 mg-5 mcg (200 unit) per tablet  Commonly known as: CITRACAL+D  Take 1 tablet by mouth 2 (two) times daily.     lancets 33 gauge Misc  USE EVERY DAY     levothyroxine 125 MCG tablet  Commonly known as: SYNTHROID  Take by mouth.     losartan 25 MG tablet  Commonly known as: COZAAR  losartan 25 mg tablet   TAKE 1 TABLET BY MOUTH DAILY     metFORMIN 500 MG ER 24hr tablet  Commonly known as: GLUCOPHAGE-XR  Take 500 mg by mouth once daily.     pravastatin 40 MG tablet  Commonly known as: PRAVACHOL  Take 1 tablet by mouth once daily.     PROLIA 60 mg/mL Syrg  Generic drug: denosumab  Inject 60 mg into the skin.     VITAMIN D ORAL  Take by mouth.            STOP taking these medications      albuterol 90 mcg/actuation inhaler  Commonly known as: VENTOLIN HFA     azithromycin 250 MG tablet  Commonly known as: Z-STIVEN     CALCIUM 300 ORAL     levocetirizine 5 MG tablet  Commonly known as: XYZAL     lisinopriL 2.5 MG tablet  Commonly known as: PRINIVIL,ZESTRIL     meclizine 25 MG tablet  Commonly known as: ANTIVERT     mometasone 50 mcg/actuation nasal spray  Commonly known as: NASONEX     moxifloxacin 0.5 % ophthalmic solution  Commonly known as: VIGAMOX     MYRBETRIQ 50 mg Tb24  Generic drug: mirabegron     ONETOUCH ULTRA TEST Strp  Generic drug: blood sugar diagnostic     pantoprazole 40 MG tablet  Commonly known as: PROTONIX     PAXLOVID 300 mg (150 mg x 2)-100 mg copackaged tablets (EUA)  Generic drug: nirmatrelvir-ritonavir     pimecrolimus 1 % cream  Commonly known as: ELIDEL     predniSONE 5 MG tablet  Commonly known as: DELTASONE              Indwelling Lines/Drains at time of discharge:   Lines/Drains/Airways       None                   Time spent on the discharge of patient: 45 minutes         Meagan Hernandez MD  Department of Hospital Medicine  '[place] [facility] ([facility])

## 2024-08-12 LAB
ASPERGILLUS AB SER QL ID: NOT DETECTED
B DERMAT AB SER QL ID: NOT DETECTED
C IMMITIS AB SER QL ID: NOT DETECTED
H CAPSUL AB SER QL ID: NOT DETECTED

## 2024-08-14 NOTE — PHYSICIAN QUERY
Please provide the pulmonary diagnosis:   COVID-19 Pneumonia           [facility]    Phone Message    May a detailed message be left on voicemail: yes     Reason for Call: Order(s): Home Care Orders: Occupational Therapy (OT): Reqesting verbal orders for occupational therapy once a week for two weeks, extension orders. Please call Linda at [phone]. Thank you.     Action Taken: Message routed to:  [facility] ([facility] Vascular    Travel Screening: Not Applicable

## 2024-08-27 ENCOUNTER — OFFICE VISIT (OUTPATIENT)
Dept: PULMONOLOGY | Facility: CLINIC | Age: 79
End: 2024-08-27
Attending: INTERNAL MEDICINE
Payer: MEDICARE

## 2024-08-27 ENCOUNTER — HOSPITAL ENCOUNTER (OUTPATIENT)
Dept: RADIOLOGY | Facility: HOSPITAL | Age: 79
Discharge: HOME | End: 2024-08-27
Attending: INTERNAL MEDICINE
Payer: MEDICARE

## 2024-08-27 VITALS
HEART RATE: 83 BPM | BODY MASS INDEX: 17.04 KG/M2 | OXYGEN SATURATION: 92 % | SYSTOLIC BLOOD PRESSURE: 122 MMHG | RESPIRATION RATE: 18 BRPM | WEIGHT: 115.06 LBS | DIASTOLIC BLOOD PRESSURE: 64 MMHG | HEIGHT: 69 IN

## 2024-08-27 DIAGNOSIS — J18.9 PNEUMONIA OF RIGHT MIDDLE LOBE DUE TO INFECTIOUS ORGANISM: ICD-10-CM

## 2024-08-27 DIAGNOSIS — J47.9 ADULT BRONCHIECTASIS: Primary | ICD-10-CM

## 2024-08-27 DIAGNOSIS — I10 PRIMARY HYPERTENSION: ICD-10-CM

## 2024-08-27 DIAGNOSIS — E03.9 HYPOTHYROIDISM, UNSPECIFIED TYPE: ICD-10-CM

## 2024-08-27 DIAGNOSIS — E78.49 OTHER HYPERLIPIDEMIA: ICD-10-CM

## 2024-08-27 DIAGNOSIS — E11.9 DIABETES MELLITUS TYPE 2, NONINSULIN DEPENDENT: ICD-10-CM

## 2024-08-27 PROCEDURE — 71046 X-RAY EXAM CHEST 2 VIEWS: CPT | Mod: 26,,, | Performed by: RADIOLOGY

## 2024-08-27 PROCEDURE — 99214 OFFICE O/P EST MOD 30 MIN: CPT | Mod: PBBFAC,25 | Performed by: INTERNAL MEDICINE

## 2024-08-27 PROCEDURE — 99999 PR PBB SHADOW E&M-EST. PATIENT-LVL IV: CPT | Mod: PBBFAC,,, | Performed by: INTERNAL MEDICINE

## 2024-08-27 PROCEDURE — 71046 X-RAY EXAM CHEST 2 VIEWS: CPT | Mod: TC

## 2024-08-27 NOTE — PROGRESS NOTES
Pulmonary Outpatient  Visit     Subjective:       Patient ID: Nehal Jimenez is a 79 y.o. female.    Social History     Tobacco Use   Smoking Status Never   Smokeless Tobacco Never            Chief Complaint: Hospital Follow Up          Nehal Jimenez is 79 y.o.  Follow-up visit hospital discharge   Right middle lobe pneumonia   Did not tolerate Levaquin changed to Augmentin   History of COVID  This is a reviewed discharge summary      [facility] Medicine  Discharge Summary        Patient Name: Nehal Jimenez  MRN: [id number]  [facility]: [id number]  Patient Class: IP- Inpatient  Admission Date: 8/4/2024  Hospital Length of Stay: 2 days  Discharge Date and Time: 8/6/2024  3:42 PM  Attending Physician: No att. providers found   Discharging Provider: Meagan Hernandez MD  Primary Care Provider: Cory Gibbons MD     Primary Care Team: Networked reference to record PCT      HPI:   79-year-old white woman with history of non-insulin-dependent diabetes mellitus type 2, hypertension, hyperlipidemia, hypothyroidism, urinary incontinence, and osteoporosis who originally went to urgent care today with complaint of rhinorrhea and cough and was diagnosed with COVID.  She had an abnormal chest x-ray with right middle lobe lung nodule and O2 saturations of 90-91% on room air and was sent to the emergency department.  Patient reports she has had upper respiratory infection symptoms over the last several days, constant, mild to moderate severity.  She has had no associated chest pain, shortness for breath, palpitations, nausea, vomiting, fevers, chills, diarrhea, constipation, or abdominal pain.  Her cough is nonproductive.  Abnormal labs in our emergency department include sodium 126, glucose 156, and troponin 1.523.  Patient denies any prior cardiac history.     * No surgery found *       Hospital Course:   79-year-old white woman with hx DM2, HTN, HLP,  hypothyroidism, urinary incontinence, and osteoporosis sent from  to ER yesterday with c/o rhinorrhea and cough and was diagnosed with COVID.  She had an abnormal chest x-ray with RML nodule and O2 saturations of 90-91% on room air. Patient reports she has had upper respiratory infection symptoms over the last several days, constant, mild to moderate severity.  She has had no associated chest pain, shortness for breath, palpitations, nausea, vomiting, fevers, chills, diarrhea, constipation, or abdominal pain.  Her cough is nonproductive.  Abnormal labs in our emergency department include sodium 126, glucose 156, and troponin 1.523.  Patient denies any prior cardiac history.     8/5- Appreciate Cards and Pulm input. Looks and feels better, no CP or SOB, no fever, chills, getting Dexa plus IV abx. IS added. Troponin coming down, and cards thinks that the elevated Trop due to demand ischemia. Pulm also cleared her as Covid Pneumonia or possible Austell Synd. Na 127 now, d/c IVF, given Lasix 20 mg IV. Will add IS to present care and may d/c her soon.      8/6- looks and feels much better, stronger, breathing easier, eating drinking well. Cough, SOB much improved. Good response to Dexamethasone plus Remdesivir. Wishes to go home. Sharla Aldrich and Paul also examined and cleared her, elevated troponin likely from Demand Ischemia. Her Sats remained 96% on RA and she did not need any O2. She was seen and examined and deemed stable for discharge home today.         Goals of Care Treatment Preferences:  Code Status: Full Code              Review of Systems   Constitutional:  Negative for fever and fatigue.   Respiratory: Negative.  Negative for snoring and cough.    All other systems reviewed and are negative.      Outpatient Encounter Medications as of 8/27/2024   Medication Sig Dispense Refill    calcium citrate-vitamin D3 315-200 mg (CITRACAL+D) 315 mg-5 mcg (200 unit) per tablet Take 1 tablet by mouth 2 (two)  times daily.      denosumab (PROLIA) 60 mg/mL Syrg Inject 60 mg into the skin.      ergocalciferol, vitamin D2, (VITAMIN D ORAL) Take by mouth.      lancets 33 gauge Misc USE EVERY DAY      levothyroxine (SYNTHROID) 125 MCG tablet Take by mouth.      losartan (COZAAR) 25 MG tablet losartan 25 mg tablet   TAKE 1 TABLET BY MOUTH DAILY      metFORMIN (GLUCOPHAGE-XR) 500 MG ER 24hr tablet Take 500 mg by mouth once daily.      pravastatin (PRAVACHOL) 40 MG tablet Take 1 tablet by mouth once daily.      pulse oximeter (PULSE OXIMETER) device by Apply Externally route 2 (two) times a day. Use twice daily at 8 AM and 3 PM and record the value in FolioDynamixt as directed. 1 each 0    [] amoxicillin-clavulanate 875-125mg (AUGMENTIN) 875-125 mg per tablet Take 1 tablet by mouth 2 (two) times daily. for 10 days 20 tablet 0     No facility-administered encounter medications on file as of 2024.           Pertinent Work Up:CXR, chest CT      Pulmonary Interventions:Abx: levaquin and augmentin      Smoking hx:none  Environmental/Occupational hx:retired           The following portions of the patient's history were reviewed and updated as appropriate: She  has a past medical history of Diabetes mellitus, Hypercholesteremia, Hypertension, Hypothyroidism, and Osteopenia.  She does not have any pertinent problems on file.  She  has a past surgical history that includes Breast biopsy; Breast cyst aspiration; Colonoscopy (2016); and Endoscopy.  Her family history includes Colon cancer (age of onset: 86) in her father; Hodgkin's lymphoma in her father.  She  reports that she has never smoked. She has never used smokeless tobacco. She reports that she does not drink alcohol and does not use drugs.  She has a current medication list which includes the following prescription(s): calcium citrate-vitamin d3 315-200 mg, prolia, ergocalciferol (vitamin d2), lancets, levothyroxine, losartan, metformin, pravastatin, and pulse  "oximeter.  Current Outpatient Medications on File Prior to Visit   Medication Sig Dispense Refill    calcium citrate-vitamin D3 315-200 mg (CITRACAL+D) 315 mg-5 mcg (200 unit) per tablet Take 1 tablet by mouth 2 (two) times daily.      denosumab (PROLIA) 60 mg/mL Syrg Inject 60 mg into the skin.      ergocalciferol, vitamin D2, (VITAMIN D ORAL) Take by mouth.      lancets 33 gauge Misc USE EVERY DAY      levothyroxine (SYNTHROID) 125 MCG tablet Take by mouth.      losartan (COZAAR) 25 MG tablet losartan 25 mg tablet   TAKE 1 TABLET BY MOUTH DAILY      metFORMIN (GLUCOPHAGE-XR) 500 MG ER 24hr tablet Take 500 mg by mouth once daily.      pravastatin (PRAVACHOL) 40 MG tablet Take 1 tablet by mouth once daily.      pulse oximeter (PULSE OXIMETER) device by Apply Externally route 2 (two) times a day. Use twice daily at 8 AM and 3 PM and record the value in Sandboxxt as directed. 1 each 0     No current facility-administered medications on file prior to visit.     She is allergic to adhesive tape-silicones and latex, natural rubber..      BP Readings from Last 3 Encounters:   08/27/24 122/64   08/06/24 113/68   08/04/24 120/66            MMRC Dyspnea Scale (4 is worst)     [] MMRC 0: Dyspneic on strenuous excercise (0 points)    [] MMRC 1: Dyspneic on walking a slight hill (0 points)    [] MMRC 2: Dyspneic on walking level ground; must stop occasionally due to breathlessness (1 point)    [] MMRC 3: Must stop for breathlessness after walking 100 yards or after a few minutes (2 points)    [] MMRC 4: Cannot leave house; breathless on dressing/undressing (3 points)                          No data to display                          Objective:     Vital Signs (Most Recent)  Vital Signs  Pulse: 83  Resp: 18  SpO2: (!) 92 %  BP: 122/64  Pain Score: 0-No pain  Height and Weight  Height: 5' 9" (175.3 cm)  Weight: 52.2 kg (115 lb 1.3 oz)  BSA (Calculated - sq m): 1.59 sq meters  BMI (Calculated): 17  Weight in (lb) to have BMI = 25: " "168.9]  Wt Readings from Last 2 Encounters:   08/27/24 52.2 kg (115 lb 1.3 oz)   08/06/24 55.2 kg (121 lb 11.1 oz)       Physical Exam  Vitals and nursing note reviewed.   Constitutional:       Appearance: She is normal weight.   HENT:      Head: Normocephalic and atraumatic.      Nose: Nose normal.   Eyes:      Pupils: Pupils are equal, round, and reactive to light.   Cardiovascular:      Rate and Rhythm: Normal rate and regular rhythm.      Pulses: Normal pulses.      Heart sounds: Normal heart sounds.   Pulmonary:      Effort: Pulmonary effort is normal.      Breath sounds: Normal breath sounds.   Abdominal:      General: Bowel sounds are normal.      Palpations: Abdomen is soft.   Musculoskeletal:      Cervical back: Normal range of motion.   Skin:     General: Skin is warm.   Neurological:      General: No focal deficit present.      Mental Status: She is alert and oriented to person, place, and time.   Psychiatric:         Mood and Affect: Mood normal.          Laboratory  Lab Results   Component Value Date    WBC 4.61 08/06/2024    RBC 3.90 (L) 08/06/2024    HGB 12.2 08/06/2024    HCT 35.6 (L) 08/06/2024    MCV 91 08/06/2024    MCH 31.3 (H) 08/06/2024    MCHC 34.3 08/06/2024    RDW 12.1 08/06/2024     08/06/2024    MPV 10.9 08/06/2024    GRAN 3.8 08/06/2024    GRAN 82.8 (H) 08/06/2024    LYMPH 0.6 (L) 08/06/2024    LYMPH 12.4 (L) 08/06/2024    MONO 0.2 (L) 08/06/2024    MONO 3.7 (L) 08/06/2024    EOS 0.0 08/06/2024    BASO 0.01 08/06/2024    EOSINOPHIL 0.0 08/06/2024    BASOPHIL 0.2 08/06/2024       BMP  Lab Results   Component Value Date     (L) 08/06/2024    K 4.2 08/06/2024    CL 97 08/06/2024    CO2 25 08/06/2024    BUN 16 08/06/2024    CREATININE 0.8 08/06/2024    CALCIUM 8.1 (L) 08/06/2024    ANIONGAP 9 08/06/2024    ESTGFRAFRICA 86 09/23/2021    AST 40 08/05/2024    ALT 36 08/05/2024    PROT 5.6 (L) 08/05/2024          No results found for: "IGE"     Lab Results   Component Value Date    " "ASPERGILLUS Not Detected 08/05/2024     No results found for: "AFUMIGATUSCL"     No results found for: "ACE"     Diagnostic Results:  I have personally reviewed today the following studies:    X-Ray Chest PA And Lateral  Narrative: EXAMINATION:  XR CHEST PA AND LATERAL    CLINICAL HISTORY:  Pneumonia, unspecified organism    TECHNIQUE:  PA and lateral views of the chest were performed.    COMPARISON:  Prior radiographs    FINDINGS:  Cardiac silhouette and mediastinal contours are stable.  Lungs demonstrate COPD hyperinflation without acute opacity.  Right middle lobe atelectasis noted.  No pleural effusion.  Osseous structures are osteopenic without acute abnormality..  Impression: Similar appearance of the chest.    Electronically signed by: Colby Florentino MD  Date:    08/27/2024  Time:    10:40       Assessment/Plan:     Problem List Items Addressed This Visit       Primary hypertension    Hyperlipidemia    Hypothyroidism    Diabetes mellitus type 2, noninsulin dependent    Adult bronchiectasis - Primary    Relevant Orders    CT Chest Without Contrast    Right middle lobe pneumonia     Repeat CT 8 weeks ensure resolution  Completed Abx         Relevant Orders    CT Chest Without Contrast             Follow up in about 8 weeks (around 10/22/2024), or chest CT.    This note was prepared using voice recognition system and is likely to have sound alike errors that may have been overlooked even after proof reading.  Please call me with any questions    Discussed diagnosis, its evaluation, treatment and usual course. All questions answered.    Thank you for the courtesy of participating in the care of this patient    Brian Aldrich MD      Personal Diagnostic Review  []  CXR    []  ECHO    []  ONSAT    []  6MWD    []  LABS    []  CHEST CT    []  PET CT    []  Biopsy results           "

## 2024-08-29 NOTE — PROGRESS NOTES
Subjective:   Patient ID:  Nehal Jimenez is a 79 y.o. female who presents for evaluation of Hospital Follow Up      HPI 78y/o F with PMHx of DM, HTN, Hlp, hypothyroidism seen at [facility]- ED COVID PNA. Found to have elevated troponin 1.5. Echo with nml EF. Here today denies any CP, angina or anginal equivalent at this time. She does aerobic 3x per week.    Exercise 3x per week  FH none  Tobacco none    Past Medical History:   Diagnosis Date    Diabetes mellitus     Hypercholesteremia     Hypertension     Hypothyroidism     Osteopenia     Endocrinologist treating       Past Surgical History:   Procedure Laterality Date    BREAST BIOPSY      BREAST CYST ASPIRATION      COLONOSCOPY  2016    BR NL    ENDOSCOPY         Social History     Tobacco Use    Smoking status: Never    Smokeless tobacco: Never   Substance Use Topics    Alcohol use: Never    Drug use: Never       Family History   Problem Relation Name Age of Onset    Colon cancer Father  86    Hodgkin's lymphoma Father         Current Outpatient Medications on File Prior to Visit   Medication Sig Dispense Refill    calcium citrate-vitamin D3 315-200 mg (CITRACAL+D) 315 mg-5 mcg (200 unit) per tablet Take 1 tablet by mouth 2 (two) times daily.      denosumab (PROLIA) 60 mg/mL Syrg Inject 60 mg into the skin.      ergocalciferol, vitamin D2, (VITAMIN D ORAL) Take by mouth.      lancets 33 gauge Misc USE EVERY DAY      levothyroxine (SYNTHROID) 125 MCG tablet Take by mouth.      losartan (COZAAR) 25 MG tablet losartan 25 mg tablet   TAKE 1 TABLET BY MOUTH DAILY      metFORMIN (GLUCOPHAGE-XR) 500 MG ER 24hr tablet Take 500 mg by mouth once daily.      pravastatin (PRAVACHOL) 40 MG tablet Take 1 tablet by mouth once daily.      pulse oximeter (PULSE OXIMETER) device by Apply Externally route 2 (two) times a day. Use twice daily at 8 AM and 3 PM and record the value in Lumicst as directed. 1 each 0     No current facility-administered medications on file prior to visit.       Wt Readings from Last 3 Encounters:   08/30/24 53.2 kg (117 lb 4.6 oz)   08/27/24 52.2 kg (115 lb 1.3 oz)   08/06/24 55.2 kg (121 lb 11.1 oz)     Temp Readings from Last 3 Encounters:   08/06/24 98.1 °F (36.7 °C) (Oral)   08/04/24 98.8 °F (37.1 °C) (Tympanic)     BP Readings from Last 3 Encounters:   08/30/24 (!) 142/80   08/27/24 122/64   08/06/24 113/68     Pulse Readings from Last 3 Encounters:   08/30/24 88   08/27/24 83   08/06/24 76        Review of Systems   Constitutional: Negative.   HENT: Negative.     Eyes: Negative.    Cardiovascular: Negative.    Respiratory: Negative.     Skin: Negative.    Musculoskeletal: Negative.    Gastrointestinal: Negative.    Genitourinary: Negative.    Neurological: Negative.    Psychiatric/Behavioral: Negative.         Objective:   Physical Exam  Vitals and nursing note reviewed.   Constitutional:       Appearance: Normal appearance.   HENT:      Head: Normocephalic.   Eyes:      Pupils: Pupils are equal, round, and reactive to light.   Cardiovascular:      Rate and Rhythm: Normal rate and regular rhythm.      Heart sounds: Normal heart sounds, S1 normal and S2 normal. No murmur heard.     No S3 or S4 sounds.   Pulmonary:      Effort: Pulmonary effort is normal.      Breath sounds: Normal breath sounds.   Abdominal:      General: Bowel sounds are normal.      Palpations: Abdomen is soft.   Musculoskeletal:         General: Normal range of motion.      Cervical back: Normal range of motion.   Skin:     Capillary Refill: Capillary refill takes less than 2 seconds.   Neurological:      General: No focal deficit present.      Mental Status: She is alert and oriented to person, place, and time.   Psychiatric:         Mood and Affect: Mood normal.         Behavior: Behavior normal.         Thought Content: Thought content normal.         Lab Results   Component Value Date    CHOL 207 (H) 03/15/2023     Lab Results   Component Value Date    HDL 96 03/15/2023     Lab Results  "  Component Value Date    LDLCALC 100 (H) 03/15/2023     Lab Results   Component Value Date    TRIG 63 03/15/2023     No results found for: "CHOLHDL"    Chemistry        Component Value Date/Time     (L) 08/06/2024 0510    K 4.2 08/06/2024 0510    CL 97 08/06/2024 0510    CO2 25 08/06/2024 0510    BUN 16 08/06/2024 0510    CREATININE 0.8 08/06/2024 0510     (H) 08/06/2024 0510        Component Value Date/Time    CALCIUM 8.1 (L) 08/06/2024 0510    ALKPHOS 69 08/05/2024 0323    AST 40 08/05/2024 0323    ALT 36 08/05/2024 0323    BILITOT 0.9 08/05/2024 0323    ESTGFRAFRICA 86 09/23/2021 0753          Lab Results   Component Value Date    TSH 0.78 04/17/2024     No results found for: "INR", "PROTIME"  @RESUFAST(WBC,HGB,HCT,MCV,PLT)  @LABRCNTIP(BNP,BNPTRIAGEBLO)@  CrCl cannot be calculated (Patient's most recent lab result is older than the maximum 7 days allowed.).     Results for orders placed during the hospital encounter of 08/04/24    Echo    Interpretation Summary    Left Ventricle: The left ventricle is normal in size. Normal wall thickness. There is concentric remodeling. There is normal systolic function. Ejection fraction by visual approximation is 55%. There is normal diastolic function.    Right Ventricle: Normal right ventricular cavity size. Wall thickness is normal. Systolic function is normal.    IVC/SVC: Normal venous pressure at 3 mmHg.     No results found for this or any previous visit.     Assessment:      1. Primary hypertension    2. Other hyperlipidemia        Plan:   Primary hypertension    Other hyperlipidemia      Losartan- HTN  Statin- Hlp  RF modifications  Low Na low fat diet  Daily exercise as tolerated  Profile BP    RTC 6 mos or sooner if needed    Courtney Guillot, FNP-C [facility], Cardiology    "

## 2024-08-30 ENCOUNTER — OFFICE VISIT (OUTPATIENT)
Dept: CARDIOLOGY | Facility: CLINIC | Age: 79
End: 2024-08-30
Payer: MEDICARE

## 2024-08-30 VITALS
HEART RATE: 88 BPM | HEIGHT: 69 IN | WEIGHT: 117.31 LBS | DIASTOLIC BLOOD PRESSURE: 78 MMHG | SYSTOLIC BLOOD PRESSURE: 138 MMHG | BODY MASS INDEX: 17.37 KG/M2

## 2024-08-30 DIAGNOSIS — E78.49 OTHER HYPERLIPIDEMIA: ICD-10-CM

## 2024-08-30 DIAGNOSIS — I10 PRIMARY HYPERTENSION: Primary | ICD-10-CM

## 2024-08-30 PROCEDURE — 99999 PR PBB SHADOW E&M-EST. PATIENT-LVL IV: CPT | Mod: PBBFAC,,,

## 2024-08-30 PROCEDURE — 99214 OFFICE O/P EST MOD 30 MIN: CPT | Mod: PBBFAC

## 2024-10-28 ENCOUNTER — HOSPITAL ENCOUNTER (OUTPATIENT)
Dept: RADIOLOGY | Facility: HOSPITAL | Age: 79
Discharge: HOME | End: 2024-10-28
Attending: INTERNAL MEDICINE
Payer: MEDICARE

## 2024-10-28 ENCOUNTER — OFFICE VISIT (OUTPATIENT)
Dept: PULMONOLOGY | Facility: CLINIC | Age: 79
End: 2024-10-28
Attending: INTERNAL MEDICINE
Payer: MEDICARE

## 2024-10-28 ENCOUNTER — TELEPHONE (OUTPATIENT)
Dept: PULMONOLOGY | Facility: CLINIC | Age: 79
End: 2024-10-28

## 2024-10-28 VITALS
RESPIRATION RATE: 19 BRPM | WEIGHT: 113.13 LBS | BODY MASS INDEX: 17.14 KG/M2 | DIASTOLIC BLOOD PRESSURE: 70 MMHG | HEIGHT: 68 IN | SYSTOLIC BLOOD PRESSURE: 132 MMHG | HEART RATE: 95 BPM | OXYGEN SATURATION: 92 %

## 2024-10-28 DIAGNOSIS — I10 PRIMARY HYPERTENSION: ICD-10-CM

## 2024-10-28 DIAGNOSIS — J47.9 ADULT BRONCHIECTASIS: ICD-10-CM

## 2024-10-28 DIAGNOSIS — E11.9 DIABETES MELLITUS TYPE 2, NONINSULIN DEPENDENT: ICD-10-CM

## 2024-10-28 DIAGNOSIS — J18.9 PNEUMONIA OF RIGHT MIDDLE LOBE DUE TO INFECTIOUS ORGANISM: ICD-10-CM

## 2024-10-28 DIAGNOSIS — E78.49 OTHER HYPERLIPIDEMIA: ICD-10-CM

## 2024-10-28 DIAGNOSIS — J47.9 ADULT BRONCHIECTASIS: Primary | ICD-10-CM

## 2024-10-28 DIAGNOSIS — E03.9 HYPOTHYROIDISM, UNSPECIFIED TYPE: ICD-10-CM

## 2024-10-28 PROCEDURE — 99215 OFFICE O/P EST HI 40 MIN: CPT | Mod: PBBFAC,25 | Performed by: INTERNAL MEDICINE

## 2024-10-28 PROCEDURE — 99214 OFFICE O/P EST MOD 30 MIN: CPT | Mod: S$PBB,,, | Performed by: INTERNAL MEDICINE

## 2024-10-28 PROCEDURE — 71250 CT THORAX DX C-: CPT | Mod: TC

## 2024-10-28 PROCEDURE — 71250 CT THORAX DX C-: CPT | Mod: 26,,, | Performed by: RADIOLOGY

## 2024-10-28 PROCEDURE — 99999 PR PBB SHADOW E&M-EST. PATIENT-LVL V: CPT | Mod: PBBFAC,,, | Performed by: INTERNAL MEDICINE

## 2024-10-28 RX ORDER — PREDNISOLONE ACETATE 10 MG/ML
1 SUSPENSION/ DROPS OPHTHALMIC
COMMUNITY
Start: 2024-08-29

## 2024-10-28 RX ORDER — LIDOCAINE HYDROCHLORIDE 40 MG/ML
4 SOLUTION TOPICAL ONCE
OUTPATIENT
Start: 2024-10-28 | End: 2024-10-28

## 2024-10-28 RX ORDER — SODIUM CHLORIDE 9 MG/ML
INJECTION, SOLUTION INTRAVENOUS CONTINUOUS
OUTPATIENT
Start: 2024-10-28

## 2024-10-28 RX ORDER — LIDOCAINE HYDROCHLORIDE 10 MG/ML
20 INJECTION, SOLUTION INFILTRATION; PERINEURAL ONCE
OUTPATIENT
Start: 2024-10-28 | End: 2024-10-28

## 2024-10-28 RX ORDER — SOLIFENACIN SUCCINATE 10 MG/1
1 TABLET, FILM COATED ORAL EVERY MORNING
COMMUNITY
Start: 2024-09-18 | End: 2025-09-18

## 2024-10-28 NOTE — H&P (VIEW-ONLY)
Pulmonary Outpatient  Visit     Subjective:       Patient ID: Nehal Jimenez is a 79 y.o. female.    Social History     Tobacco Use   Smoking Status Never   Smokeless Tobacco Never            Chief Complaint: Bronchiectasis          Nehal Jimenez is 79 y.o.  Follow-up visit hospital discharge   Right middle lobe pneumonia   Did not tolerate Levaquin changed to Augmentin   History of COVID  This is a reviewed discharge summary    10/28/2024  Followup  Reviewed chest ct  Shows progressio  Will need Bronch, BAL  Denies sputum expectoration   Denies fever denies night sweats   Generally fastidious eater due to her diabetes   Chest CT reviewed  Shows progression of atypical mycobacteria  Tree-in-bud appearance  Indication for bronchoscopy discussed             Review of Systems   Constitutional:  Negative for fever and fatigue.   Respiratory: Negative.  Negative for snoring and cough.    All other systems reviewed and are negative.      Outpatient Encounter Medications as of 10/28/2024   Medication Sig Dispense Refill    prednisoLONE acetate (PRED FORTE) 1 % DrpS Place 1 drop into both eyes.      solifenacin (VESICARE) 10 MG tablet Take 1 tablet by mouth every morning.      calcium citrate-vitamin D3 315-200 mg (CITRACAL+D) 315 mg-5 mcg (200 unit) per tablet Take 1 tablet by mouth 2 (two) times daily.      denosumab (PROLIA) 60 mg/mL Syrg Inject 60 mg into the skin.      lancets 33 gauge Misc USE EVERY DAY      levothyroxine (SYNTHROID) 125 MCG tablet Take by mouth.      losartan (COZAAR) 25 MG tablet losartan 25 mg tablet   TAKE 1 TABLET BY MOUTH DAILY      metFORMIN (GLUCOPHAGE-XR) 500 MG ER 24hr tablet Take 500 mg by mouth once daily.      pravastatin (PRAVACHOL) 40 MG tablet Take 1 tablet by mouth once daily.      pulse oximeter (PULSE OXIMETER) device by Apply Externally route 2 (two) times a day. Use twice daily at 8 AM and 3 PM and record the value in rag & bone as  directed. 1 each 0    [DISCONTINUED] ergocalciferol, vitamin D2, (VITAMIN D ORAL) Take by mouth.       No facility-administered encounter medications on file as of 10/28/2024.           Pertinent Work Up:CXR, chest CT      Pulmonary Interventions:Abx: levaquin and augmentin      Smoking hx:none  Environmental/Occupational hx:retired           The following portions of the patient's history were reviewed and updated as appropriate: She  has a past medical history of Diabetes mellitus, Hypercholesteremia, Hypertension, Hypothyroidism, Osteopenia, and Pneumonia.  She does not have any pertinent problems on file.  She  has a past surgical history that includes Breast biopsy; Breast cyst aspiration; Colonoscopy (2016); and Endoscopy.  Her family history includes Colon cancer (age of onset: 86) in her father; Hodgkin's lymphoma in her father.  She  reports that she has never smoked. She has never used smokeless tobacco. She reports that she does not drink alcohol and does not use drugs.  She has a current medication list which includes the following prescription(s): prednisolone acetate, solifenacin, calcium citrate-vitamin d3 315-200 mg, prolia, lancets, levothyroxine, losartan, metformin, pravastatin, and pulse oximeter.  Current Outpatient Medications on File Prior to Visit   Medication Sig Dispense Refill    prednisoLONE acetate (PRED FORTE) 1 % DrpS Place 1 drop into both eyes.      solifenacin (VESICARE) 10 MG tablet Take 1 tablet by mouth every morning.      calcium citrate-vitamin D3 315-200 mg (CITRACAL+D) 315 mg-5 mcg (200 unit) per tablet Take 1 tablet by mouth 2 (two) times daily.      denosumab (PROLIA) 60 mg/mL Syrg Inject 60 mg into the skin.      lancets 33 gauge Misc USE EVERY DAY      levothyroxine (SYNTHROID) 125 MCG tablet Take by mouth.      losartan (COZAAR) 25 MG tablet losartan 25 mg tablet   TAKE 1 TABLET BY MOUTH DAILY      metFORMIN (GLUCOPHAGE-XR) 500 MG ER 24hr tablet Take 500 mg by mouth once  "daily.      pravastatin (PRAVACHOL) 40 MG tablet Take 1 tablet by mouth once daily.      pulse oximeter (PULSE OXIMETER) device by Apply Externally route 2 (two) times a day. Use twice daily at 8 AM and 3 PM and record the value in [id number] as directed. 1 each 0    [DISCONTINUED] ergocalciferol, vitamin D2, (VITAMIN D ORAL) Take by mouth.       No current facility-administered medications on file prior to visit.     She is allergic to adhesive tape-silicones; latex, natural rubber; and albuterol..      BP Readings from Last 3 Encounters:   10/28/24 132/70   08/30/24 138/78   08/27/24 122/64            MMRC Dyspnea Scale (4 is worst)     [] MMRC 0: Dyspneic on strenuous excercise (0 points)    [] MMRC 1: Dyspneic on walking a slight hill (0 points)    [] MMRC 2: Dyspneic on walking level ground; must stop occasionally due to breathlessness (1 point)    [] MMRC 3: Must stop for breathlessness after walking 100 yards or after a few minutes (2 points)    [] MMRC 4: Cannot leave house; breathless on dressing/undressing (3 points)                          No data to display                          Objective:     Vital Signs (Most Recent)  Vital Signs  Pulse: 95  Resp: 19  SpO2: (!) 92 %  BP: 132/70  Pain Score: 0-No pain  Height and Weight  Height: 5' 8" (172.7 cm)  Weight: 51.3 kg (113 lb 1.5 oz)  BSA (Calculated - sq m): 1.57 sq meters  BMI (Calculated): 17.2  Weight in (lb) to have BMI = 25: 164.1]  Wt Readings from Last 2 Encounters:   10/28/24 51.3 kg (113 lb 1.5 oz)   08/30/24 53.2 kg (117 lb 4.6 oz)       Physical Exam  Vitals and nursing note reviewed.   Constitutional:       Appearance: She is normal weight.   HENT:      Head: Normocephalic and atraumatic.      Nose: Nose normal.   Eyes:      Pupils: Pupils are equal, round, and reactive to light.   Cardiovascular:      Rate and Rhythm: Normal rate and regular rhythm.      Pulses: Normal pulses.      Heart sounds: Normal heart sounds.   Pulmonary:      Effort: " "Pulmonary effort is normal.      Breath sounds: Normal breath sounds.   Abdominal:      General: Bowel sounds are normal.      Palpations: Abdomen is soft.   Musculoskeletal:      Cervical back: Normal range of motion.   Skin:     General: Skin is warm.   Neurological:      General: No focal deficit present.      Mental Status: She is alert and oriented to person, place, and time.   Psychiatric:         Mood and Affect: Mood normal.          Laboratory  Lab Results   Component Value Date    WBC 4.61 08/06/2024    RBC 3.90 (L) 08/06/2024    HGB 12.2 08/06/2024    HCT 35.6 (L) 08/06/2024    MCV 91 08/06/2024    MCH 31.3 (H) 08/06/2024    MCHC 34.3 08/06/2024    RDW 12.1 08/06/2024     08/06/2024    MPV 10.9 08/06/2024    GRAN 3.8 08/06/2024    GRAN 82.8 (H) 08/06/2024    LYMPH 0.6 (L) 08/06/2024    LYMPH 12.4 (L) 08/06/2024    MONO 0.2 (L) 08/06/2024    MONO 3.7 (L) 08/06/2024    EOS 0.0 08/06/2024    BASO 0.01 08/06/2024    EOSINOPHIL 0.0 08/06/2024    BASOPHIL 0.2 08/06/2024       BMP  Lab Results   Component Value Date     (L) 08/06/2024    K 4.2 08/06/2024    CL 97 08/06/2024    CO2 25 08/06/2024    BUN 16 08/06/2024    CREATININE 0.8 08/06/2024    CALCIUM 8.1 (L) 08/06/2024    ANIONGAP 9 08/06/2024    ESTGFRAFRICA 86 09/23/2021    AST 40 08/05/2024    ALT 36 08/05/2024    PROT 5.6 (L) 08/05/2024          No results found for: "IGE"     Lab Results   Component Value Date    ASPERGILLUS Not Detected 08/05/2024     No results found for: "AFUMIGATUSCL"     No results found for: "ACE"     Diagnostic Results:  I have personally reviewed today the following studies:    CT Chest Without Contrast  Narrative: EXAMINATION:  CT CHEST WITHOUT CONTRAST    CLINICAL HISTORY:  Bronchiectasis, uncomplicatedRML atelectasis;    TECHNIQUE:  Axial CT images performed through the chest without intravenous contrast.    COMPARISON:  08/04/2024    FINDINGS:  Progression of the tree-in-bud micronodular opacities in the upper " lobes.  The micro nodular opacities with bronchiectasis and right middle lobe atelectasis is otherwise stable in the lower lobes, lingula, and right middle lobe.  No effusions.  No thoracic adenopathy.    The upper abdominal visualized organs are within normal limits.    The bones are intact.  Impression: Progression of the tree-in-bud micronodular opacities specifically in the upper lobes characteristic of an indolent infectious bronchiolitis such as DORI.    All CT scans at this facility are performed  using dose modulation techniques as appropriate to performed exam including the following:  automated exposure control; adjustment of mA and/or kV according to the patients size (this includes techniques or standardized protocols for targeted exams where dose is matched to indication/reason for exam: i.e. extremities or head);  iterative reconstruction technique.    Electronically signed by: Amadeo Avila MD  Date:    10/28/2024  Time:    10:53        Assessment/Plan:     Problem List Items Addressed This Visit       Primary hypertension    Hyperlipidemia    Hypothyroidism    Diabetes mellitus type 2, noninsulin dependent    Right middle lobe pneumonia    Relevant Orders    Case Request Endoscopy: Bronchoscopy - insert lighted tube into airway to take a biopsy of lung (Completed)    Adult bronchiectasis - Primary     Suggestive of Lady Talala syndrome    Followup in office    PFT  Nebulizer  Acapella  Hypertonic saline nebs    Chest CT shows progression we will prior for bronchoscopy           Relevant Orders    Case Request Endoscopy: Bronchoscopy - insert lighted tube into airway to take a biopsy of lung (Completed)        My diagnostic impression and work-up plan were discussed at length with patient. Risks were discussed. Bronchscopy procedure. Complications of the procedure discussed in detail with patient. Complications including but not limited to infection that may require hospital admission, bleeding that  may require blood transfusion and or hospital admission, perforation of the lung which may require surgery,chance of injury to the throat, windpipe or bronchial tubes, laryngospam, coughing, aspiration, hypoxemia or cardiac arrythmias. Patient expressed and verbalized understanding. The material risks of anesthesia in connection with the procedure including brain damage, paralysis from the neck downwards, paralysis from the waist downwards, loss of function of an arm or a leg, disfigurement (incluing scars)and death were discussed with patient who expressedand verbalized understanding. Alternate treatments and material risks associated with such alternatives were discussed with pateint. These include radiologic surveillance with minimal risk and sugery with an indeterminate risk. The material risks of refusing the procedure was discussed in detail. This includes no diagnosis or confirmation of diagnisis and rendering of appropriate treatment the risk of which depends on the nature of the diagnosed illness. Patient expressed and verbalized understanding. Procedure scheduled for date Nov 6th. Consent signed. Orders entered. Coagulation studies per orders.   All questions were answered and the patient expressed understanding      Follow up in about 6 weeks (around 12/9/2024), or bronch consent.    This note was prepared using voice recognition system and is likely to have sound alike errors that may have been overlooked even after proof reading.  Please call me with any questions    Discussed diagnosis, its evaluation, treatment and usual course. All questions answered.    Thank you for the courtesy of participating in the care of this patient    Brian Aldrich MD      Personal Diagnostic Review  []  CXR    []  ECHO    []  ONSAT    []  6MWD    []  LABS    []  CHEST CT    []  PET CT    []  Biopsy results

## 2024-11-06 ENCOUNTER — ANESTHESIA (OUTPATIENT)
Dept: ENDOSCOPY | Facility: HOSPITAL | Age: 79
End: 2024-11-06
Payer: MEDICARE

## 2024-11-06 ENCOUNTER — ANESTHESIA EVENT (OUTPATIENT)
Dept: ENDOSCOPY | Facility: HOSPITAL | Age: 79
End: 2024-11-06
Payer: MEDICARE

## 2024-11-06 ENCOUNTER — HOSPITAL ENCOUNTER (OUTPATIENT)
Facility: HOSPITAL | Age: 79
Discharge: HOME | End: 2024-11-06
Attending: INTERNAL MEDICINE | Admitting: INTERNAL MEDICINE
Payer: MEDICARE

## 2024-11-06 DIAGNOSIS — J47.9 ADULT BRONCHIECTASIS: ICD-10-CM

## 2024-11-06 DIAGNOSIS — J18.9 PNEUMONIA OF RIGHT MIDDLE LOBE DUE TO INFECTIOUS ORGANISM: ICD-10-CM

## 2024-11-06 DIAGNOSIS — J98.19 RIGHT MIDDLE LOBE SYNDROME: Primary | ICD-10-CM

## 2024-11-06 LAB — POCT GLUCOSE: 150 MG/DL (ref 70–110)

## 2024-11-06 PROCEDURE — 87102 FUNGUS ISOLATION CULTURE: CPT | Performed by: INTERNAL MEDICINE

## 2024-11-06 PROCEDURE — 25000003 PHARM REV CODE 250: Performed by: INTERNAL MEDICINE

## 2024-11-06 PROCEDURE — 87206 SMEAR FLUORESCENT/ACID STAI: CPT | Performed by: INTERNAL MEDICINE

## 2024-11-06 PROCEDURE — 88305 TISSUE EXAM BY PATHOLOGIST: CPT | Performed by: STUDENT IN AN ORGANIZED HEALTH CARE EDUCATION/TRAINING PROGRAM

## 2024-11-06 PROCEDURE — 63600175 PHARM REV CODE 636 W HCPCS: Performed by: INTERNAL MEDICINE

## 2024-11-06 PROCEDURE — 31645 BRNCHSC W/THER ASPIR 1ST: CPT | Performed by: INTERNAL MEDICINE

## 2024-11-06 PROCEDURE — 25000003 PHARM REV CODE 250: Performed by: STUDENT IN AN ORGANIZED HEALTH CARE EDUCATION/TRAINING PROGRAM

## 2024-11-06 PROCEDURE — 63600175 PHARM REV CODE 636 W HCPCS: Performed by: STUDENT IN AN ORGANIZED HEALTH CARE EDUCATION/TRAINING PROGRAM

## 2024-11-06 PROCEDURE — 88112 CYTOPATH CELL ENHANCE TECH: CPT | Performed by: STUDENT IN AN ORGANIZED HEALTH CARE EDUCATION/TRAINING PROGRAM

## 2024-11-06 PROCEDURE — 31645 BRNCHSC W/THER ASPIR 1ST: CPT | Mod: ,,, | Performed by: INTERNAL MEDICINE

## 2024-11-06 PROCEDURE — 88112 CYTOPATH CELL ENHANCE TECH: CPT | Mod: 26,,, | Performed by: STUDENT IN AN ORGANIZED HEALTH CARE EDUCATION/TRAINING PROGRAM

## 2024-11-06 PROCEDURE — 87015 SPECIMEN INFECT AGNT CONCNTJ: CPT | Mod: 59 | Performed by: INTERNAL MEDICINE

## 2024-11-06 PROCEDURE — 87116 MYCOBACTERIA CULTURE: CPT | Mod: 59 | Performed by: INTERNAL MEDICINE

## 2024-11-06 PROCEDURE — 87070 CULTURE OTHR SPECIMN AEROBIC: CPT | Performed by: INTERNAL MEDICINE

## 2024-11-06 PROCEDURE — 87210 SMEAR WET MOUNT SALINE/INK: CPT | Performed by: INTERNAL MEDICINE

## 2024-11-06 PROCEDURE — 37000008 HC ANESTHESIA 1ST 15 MINUTES: Performed by: INTERNAL MEDICINE

## 2024-11-06 PROCEDURE — 31624 DX BRONCHOSCOPE/LAVAGE: CPT | Performed by: INTERNAL MEDICINE

## 2024-11-06 PROCEDURE — 31624 DX BRONCHOSCOPE/LAVAGE: CPT | Mod: 51,,, | Performed by: INTERNAL MEDICINE

## 2024-11-06 PROCEDURE — 87205 SMEAR GRAM STAIN: CPT | Performed by: INTERNAL MEDICINE

## 2024-11-06 PROCEDURE — 37000009 HC ANESTHESIA EA ADD 15 MINS: Performed by: INTERNAL MEDICINE

## 2024-11-06 PROCEDURE — 88305 TISSUE EXAM BY PATHOLOGIST: CPT | Mod: 26,,, | Performed by: STUDENT IN AN ORGANIZED HEALTH CARE EDUCATION/TRAINING PROGRAM

## 2024-11-06 RX ORDER — SODIUM CHLORIDE 9 MG/ML
INJECTION, SOLUTION INTRAVENOUS CONTINUOUS
Status: DISCONTINUED | OUTPATIENT
Start: 2024-11-06 | End: 2024-11-06 | Stop reason: HOSPADM

## 2024-11-06 RX ORDER — LIDOCAINE HYDROCHLORIDE 40 MG/ML
4 SOLUTION TOPICAL ONCE
Status: COMPLETED | OUTPATIENT
Start: 2024-11-06 | End: 2024-11-06

## 2024-11-06 RX ORDER — LIDOCAINE HYDROCHLORIDE 20 MG/ML
INJECTION INTRAVENOUS
Status: DISCONTINUED | OUTPATIENT
Start: 2024-11-06 | End: 2024-11-06

## 2024-11-06 RX ORDER — ROCURONIUM BROMIDE 10 MG/ML
INJECTION, SOLUTION INTRAVENOUS
Status: DISCONTINUED | OUTPATIENT
Start: 2024-11-06 | End: 2024-11-06

## 2024-11-06 RX ORDER — LIDOCAINE HYDROCHLORIDE 10 MG/ML
INJECTION, SOLUTION INFILTRATION; PERINEURAL
Status: COMPLETED | OUTPATIENT
Start: 2024-11-06 | End: 2024-11-06

## 2024-11-06 RX ORDER — FENTANYL CITRATE 50 UG/ML
INJECTION, SOLUTION INTRAMUSCULAR; INTRAVENOUS
Status: DISCONTINUED | OUTPATIENT
Start: 2024-11-06 | End: 2024-11-06

## 2024-11-06 RX ORDER — MIDAZOLAM HYDROCHLORIDE 1 MG/ML
INJECTION INTRAMUSCULAR; INTRAVENOUS
Status: DISCONTINUED | OUTPATIENT
Start: 2024-11-06 | End: 2024-11-06

## 2024-11-06 RX ORDER — DEXAMETHASONE SODIUM PHOSPHATE 4 MG/ML
INJECTION, SOLUTION INTRA-ARTICULAR; INTRALESIONAL; INTRAMUSCULAR; INTRAVENOUS; SOFT TISSUE
Status: DISCONTINUED | OUTPATIENT
Start: 2024-11-06 | End: 2024-11-06

## 2024-11-06 RX ORDER — ONDANSETRON HYDROCHLORIDE 2 MG/ML
INJECTION, SOLUTION INTRAVENOUS
Status: DISCONTINUED | OUTPATIENT
Start: 2024-11-06 | End: 2024-11-06

## 2024-11-06 RX ORDER — PROPOFOL 10 MG/ML
VIAL (ML) INTRAVENOUS
Status: DISCONTINUED | OUTPATIENT
Start: 2024-11-06 | End: 2024-11-06

## 2024-11-06 RX ADMIN — DEXAMETHASONE SODIUM PHOSPHATE 8 MG: 4 INJECTION, SOLUTION INTRA-ARTICULAR; INTRALESIONAL; INTRAMUSCULAR; INTRAVENOUS; SOFT TISSUE at 08:11

## 2024-11-06 RX ADMIN — SODIUM CHLORIDE: 9 INJECTION, SOLUTION INTRAVENOUS at 08:11

## 2024-11-06 RX ADMIN — MIDAZOLAM HYDROCHLORIDE 1 MG: 1 INJECTION, SOLUTION INTRAMUSCULAR; INTRAVENOUS at 08:11

## 2024-11-06 RX ADMIN — FENTANYL CITRATE 25 MCG: 50 INJECTION, SOLUTION INTRAMUSCULAR; INTRAVENOUS at 08:11

## 2024-11-06 RX ADMIN — SUGAMMADEX 200 MG: 100 INJECTION, SOLUTION INTRAVENOUS at 08:11

## 2024-11-06 RX ADMIN — ROCURONIUM BROMIDE 10 MG: 10 SOLUTION INTRAVENOUS at 08:11

## 2024-11-06 RX ADMIN — PROPOFOL 100 MG: 10 INJECTION, EMULSION INTRAVENOUS at 08:11

## 2024-11-06 RX ADMIN — LIDOCAINE HYDROCHLORIDE 50 MG: 20 INJECTION INTRAVENOUS at 08:11

## 2024-11-06 RX ADMIN — ONDANSETRON 4 MG: 2 INJECTION INTRAMUSCULAR; INTRAVENOUS at 08:11

## 2024-11-06 RX ADMIN — ROCURONIUM BROMIDE 30 MG: 10 SOLUTION INTRAVENOUS at 08:11

## 2024-11-06 RX ADMIN — LIDOCAINE HYDROCHLORIDE 4 ML: 40 SOLUTION ORAL at 07:11

## 2024-11-06 NOTE — OP NOTE
Patient willing to bronchoscopy area   Patient intubated with size 8.5 ET tube   Immediate preprocedure time-out completed   Airway inspection performed   Scanty secretions   Lavage performed collectively sent for microbiology   BAL to the right middle lobe sent for cytology microbiology and white count differential   Patient tolerated procedure well   Extubated

## 2024-11-06 NOTE — DISCHARGE SUMMARY
[facility] (Hospital)  Discharge Note  Short Stay    Procedure(s) (LRB):  Bronchoscopy - insert lighted tube into airway to take a biopsy of lung (Bilateral)      OUTCOME: Patient tolerated treatment/procedure well without complication and is now ready for discharge.    DISPOSITION: Home or Self Care    FINAL DIAGNOSIS:  Right middle lobe syndrome    FOLLOWUP: In clinic    DISCHARGE INSTRUCTIONS:  No discharge procedures on file.     TIME SPENT ON DISCHARGE: 15 minutes

## 2024-11-06 NOTE — ANESTHESIA PROCEDURE NOTES
Intubation    Date/Time: 11/6/2024 8:05 AM    Performed by: Colby Guardado CRNA  Authorized by: Renny Sommer MD    Intubation:     Induction:  Intravenous    Intubated:  Postinduction    Mask Ventilation:  Easy mask    Attempts:  1    Attempted By:  CRNA    Method of Intubation:  Video laryngoscopy    Blade:  Duff 3    Laryngeal View Grade: Grade I - full view of cords      Difficult Airway Encountered?: No      Complications:  None    Airway Device:  Oral endotracheal tube    Airway Device Size:  8.5    Style/Cuff Inflation:  Cuffed (inflated to minimal occlusive pressure)    Secured at:  The lips    Placement Verified By:  Capnometry    Complicating Factors:  None    Findings Post-Intubation:  BS equal bilateral and atraumatic/condition of teeth unchanged

## 2024-11-06 NOTE — ANESTHESIA PREPROCEDURE EVALUATION
11/06/2024  Nehal Jimenez is a 79 y.o., female.      Pre-op Assessment    I have reviewed the Patient Summary Reports.     I have reviewed the Nursing Notes. I have reviewed the NPO Status.   I have reviewed the Medications.     Review of Systems  Social:  Non-Smoker, No Alcohol Use       Cardiovascular:     Hypertension                                    Hypertension         Pulmonary:  Pneumonia                  Pulmonary Infection:  Pneumonia.     Endocrine:  Diabetes Hypothyroidism   Diabetes                   Hypothyroidism              Physical Exam  General: Well nourished, Cooperative, Alert and Oriented    Airway:  Mallampati: II   Mouth Opening: Normal  TM Distance: Normal    Dental:  Intact        Anesthesia Plan  Type of Anesthesia, risks & benefits discussed:    Anesthesia Type: Gen ETT  Intra-op Monitoring Plan: Standard ASA Monitors  Post Op Pain Control Plan: multimodal analgesia and IV/PO Opioids PRN  Induction:  IV  Airway Plan: Direct and Video, Post-Induction with ramp  Informed Consent: Informed consent signed with the Patient and all parties understand the risks and agree with anesthesia plan.  All questions answered.   ASA Score: 3  Day of Surgery Review of History & Physical: H&P Update referred to the surgeon/provider.    Ready For Surgery From Anesthesia Perspective.     .

## 2024-11-06 NOTE — PLAN OF CARE
DR Aldrich  at bedside to speak with pt  and spouse about the results of the procedure. All questions answered with no further questions at this time.

## 2024-11-06 NOTE — ANESTHESIA POSTPROCEDURE EVALUATION
Anesthesia Post Evaluation    Patient: Nehal Jimenez    Procedure(s) Performed: Procedure(s) (LRB):  Bronchoscopy - insert lighted tube into airway to take a biopsy of lung (Bilateral)    Final Anesthesia Type: MAC      Patient location during evaluation: PACU  Patient participation: Yes- Able to Participate  Level of consciousness: awake and alert and oriented  Post-procedure vital signs: reviewed and stable  Pain management: adequate  Airway patency: patent  ALLEN mitigation strategies: Multimodal analgesia and Extubation and recovery carried out in lateral, semiupright, or other nonsupine position  PONV status at discharge: No PONV  Anesthetic complications: no      Cardiovascular status: blood pressure returned to baseline and hemodynamically stable  Respiratory status: unassisted and spontaneous ventilation  Hydration status: euvolemic  Follow-up not needed.  Comments: Report given to PACU RN. Hand Off Tool Used. RN given opportunity to ask questions or clarify concerns. No Concerns verbalized. Pt. Left in stable condition. SV. Vital Signs Return to Near Baseline. No s/s of distress noted.               Vitals Value Taken Time   /63 11/06/24 0832   Temp 36.3 °C (97.3 °F) 11/06/24 0832   Pulse 65 11/06/24 0832   Resp 15 11/06/24 0832   SpO2 100 % 11/06/24 0832         No case tracking events are documented in the log.      Pain/Paola Score: Paola Score: 9 (11/6/2024  8:32 AM)

## 2024-11-06 NOTE — TRANSFER OF CARE
"Anesthesia Transfer of Care Note    Patient: Nehal Jimenez    Procedure(s) Performed: Procedure(s) (LRB):  Bronchoscopy - insert lighted tube into airway to take a biopsy of lung (Bilateral)    Patient location: PACU    Anesthesia Type: general    Transport from OR: Transported from OR on room air with adequate spontaneous ventilation    Post pain: adequate analgesia    Post assessment: no apparent anesthetic complications and tolerated procedure well    Post vital signs: stable    Level of consciousness: responds to stimulation and sedated    Nausea/Vomiting: no nausea/vomiting    Complications: none    Transfer of care protocol was followedComments: Report given to PACU RN at bedside. Hand off tool used. RN given opportunity to ask questions or clarify concerns. No Concerns verbalized. RN was asked if ready to assume care of patient. RN verbally confirmed. Pt. left in stable condition. SV. Vital Signs Return to Near Baseline. No s/s of distress noted.       Last vitals: Visit Vitals  BP (!) 141/63 (BP Location: Left arm, Patient Position: Lying)   Pulse 65   Temp 36.3 °C (97.3 °F) (Temporal)   Resp 15   Ht 5' 8" (1.727 m)   Wt 51.3 kg (113 lb)   SpO2 100%   Breastfeeding No   BMI 17.18 kg/m²     "

## 2024-11-07 VITALS
RESPIRATION RATE: 20 BRPM | DIASTOLIC BLOOD PRESSURE: 59 MMHG | HEIGHT: 68 IN | BODY MASS INDEX: 17.13 KG/M2 | OXYGEN SATURATION: 90 % | HEART RATE: 75 BPM | WEIGHT: 113 LBS | TEMPERATURE: 97 F | SYSTOLIC BLOOD PRESSURE: 124 MMHG

## 2024-11-07 LAB
ACID FAST MOD KINY STN SPEC: NORMAL
KOH PREP SPEC: NORMAL
KOH PREP SPEC: NORMAL
MYCOBACTERIUM SPEC QL CULT: NORMAL

## 2024-11-08 LAB
ACID FAST MOD KINY STN SPEC: NORMAL
FINAL PATHOLOGIC DIAGNOSIS: NORMAL
Lab: NORMAL
MYCOBACTERIUM SPEC QL CULT: NORMAL

## 2024-11-09 LAB
BACTERIA SPEC AEROBE CULT: NO GROWTH
GRAM STN SPEC: NORMAL

## 2024-11-13 LAB
FUNGUS SPEC CULT: NORMAL
FUNGUS SPEC CULT: NORMAL

## 2024-12-09 NOTE — PROGRESS NOTES
Pulmonary Outpatient  Visit     Subjective:       Patient ID: Nehal Jimenez is a 79 y.o. female.    Social History     Tobacco Use   Smoking Status Never   Smokeless Tobacco Never            Chief Complaint: Bronchoscopy results          Nehal Jimenez is 79 y.o.  Follow-up visit hospital discharge   Right middle lobe pneumonia   Did not tolerate Levaquin changed to Augmentin   History of COVID  This is a reviewed discharge summary    10/28/2024  Followup  Reviewed chest ct  Shows progressio  Will need Bronch, BAL  Denies sputum expectoration   Denies fever denies night sweats   Generally fastidious eater due to her diabetes   Chest CT reviewed  Shows progression of atypical mycobacteria  Tree-in-bud appearance  Indication for bronchoscopy discussed    12/10/2024   Follow-up visit   Bronchoscopy results reviewed  Incidental finding of atypical mycobacteria   Non symptomatic   We will refer to Infectious diseases   Final ID pending  No symptoms other than just gradual weight loss             Review of Systems   Constitutional:  Negative for fever and fatigue.   Respiratory: Negative.  Negative for snoring and cough.    All other systems reviewed and are negative.      Outpatient Encounter Medications as of 12/10/2024   Medication Sig Dispense Refill    calcium citrate-vitamin D3 315-200 mg (CITRACAL+D) 315 mg-5 mcg (200 unit) per tablet Take 1 tablet by mouth 2 (two) times daily.      denosumab (PROLIA) 60 mg/mL Syrg Inject 60 mg into the skin. Patient takes every 6 months. Next due on 11/14/2024.      lancets 33 gauge Misc USE EVERY DAY      levothyroxine (SYNTHROID) 125 MCG tablet Take by mouth.      losartan (COZAAR) 25 MG tablet losartan 25 mg tablet   TAKE 1 TABLET BY MOUTH DAILY      metFORMIN (GLUCOPHAGE-XR) 500 MG ER 24hr tablet Take 500 mg by mouth once daily.      ONETOUCH VERIO TEST STRIPS Strp SMARTSIG:Via Meter Daily      pravastatin (PRAVACHOL) 40 MG tablet  Take 1 tablet by mouth once daily.      prednisoLONE acetate (PRED FORTE) 1 % DrpS Place 1 drop into both eyes.      pulse oximeter (PULSE OXIMETER) device by Apply Externally route 2 (two) times a day. Use twice daily at 8 AM and 3 PM and [facility] as directed. 1 each 0    solifenacin (VESICARE) 10 MG tablet Take 1 tablet by mouth every morning.      triamcinolone acetonide 0.1% (KENALOG) 0.1 % cream Apply to the affected area twice daily.  Do not apply to face, underarms, or groin.       No facility-administered encounter medications on file as of 12/10/2024.           Pertinent Work Up:CXR, chest CT      Pulmonary Interventions:Abx: levaquin and augmentin      Smoking hx:none  Environmental/Occupational hx:retired           The following portions of the patient's history were reviewed and updated as appropriate: She  has a past medical history of Diabetes mellitus, Hypercholesteremia, Hypertension, Hypothyroidism, Osteopenia, and Pneumonia.  She does not have any pertinent problems on file.  She  has a past surgical history that includes Breast biopsy; Breast cyst aspiration; Colonoscopy (2016); Endoscopy; and Bronchoscopy (Bilateral, 11/6/2024).  Her family history includes Colon cancer (age of onset: 86) in her father; Hodgkin's lymphoma in her father.  She  reports that she has never smoked. She has never used smokeless tobacco. She reports that she does not drink alcohol and does not use drugs.  She has a current medication list which includes the following prescription(s): calcium citrate-vitamin d3 315-200 mg, prolia, lancets, levothyroxine, losartan, metformin, onetouch verio test strips, pravastatin, prednisolone acetate, pulse oximeter, solifenacin, and triamcinolone acetonide 0.1%.  Current Outpatient Medications on File Prior to Visit   Medication Sig Dispense Refill    calcium citrate-vitamin D3 315-200 mg (CITRACAL+D) 315 mg-5 mcg (200 unit) per tablet Take 1 tablet by mouth 2 (two)  times daily.      denosumab (PROLIA) 60 mg/mL Syrg Inject 60 mg into the skin. Patient takes every 6 months. Next due on 11/14/2024.      lancets 33 gauge Misc USE EVERY DAY      levothyroxine (SYNTHROID) 125 MCG tablet Take by mouth.      losartan (COZAAR) 25 MG tablet losartan 25 mg tablet   TAKE 1 TABLET BY MOUTH DAILY      metFORMIN (GLUCOPHAGE-XR) 500 MG ER 24hr tablet Take 500 mg by mouth once daily.      ONETOUCH VERIO TEST STRIPS Strp SMARTSIG:Via Meter Daily      pravastatin (PRAVACHOL) 40 MG tablet Take 1 tablet by mouth once daily.      prednisoLONE acetate (PRED FORTE) 1 % DrpS Place 1 drop into both eyes.      pulse oximeter (PULSE OXIMETER) device by Apply Externally route 2 (two) times a day. Use twice daily at 8 AM and 3 PM and record the value in [facility] as directed. 1 each 0    solifenacin (VESICARE) 10 MG tablet Take 1 tablet by mouth every morning.      triamcinolone acetonide 0.1% (KENALOG) 0.1 % cream Apply to the affected area twice daily.  Do not apply to face, underarms, or groin.       No current facility-administered medications on file prior to visit.     She is allergic to adhesive tape-silicones; latex, natural rubber; and albuterol..      BP Readings from Last 3 Encounters:   12/10/24 125/60   12/10/24 120/66   11/06/24 (!) 124/59            MMRC Dyspnea Scale (4 is worst)     [] MMRC 0: Dyspneic on strenuous excercise (0 points)    [] MMRC 1: Dyspneic on walking a slight hill (0 points)    [] MMRC 2: Dyspneic on walking level ground; must stop occasionally due to breathlessness (1 point)    [] MMRC 3: Must stop for breathlessness after walking 100 yards or after a few minutes (2 points)    [] MMRC 4: Cannot leave house; breathless on dressing/undressing (3 points)                          No data to display                          Objective:     Vital Signs (Most Recent)  Vital Signs  Pulse: 95  Resp: 20  SpO2: 99 %  BP: 125/60  Patient Position: Sitting  Pain Score: 0-No  "pain  Height and Weight  Height: 5' 8" (172.7 cm)  Weight: 53.4 kg (117 lb 9.9 oz)  BSA (Calculated - sq m): 1.6 sq meters  BMI (Calculated): 17.9  Weight in (lb) to have BMI = 25: 164.1]  Wt Readings from Last 2 Encounters:   12/10/24 53.4 kg (117 lb 9.9 oz)   12/10/24 53 kg (116 lb 14.4 oz)       Physical Exam  Vitals and nursing note reviewed.   Constitutional:       Appearance: She is normal weight.   HENT:      Head: Normocephalic and atraumatic.      Nose: Nose normal.   Eyes:      Pupils: Pupils are equal, round, and reactive to light.   Cardiovascular:      Rate and Rhythm: Normal rate and regular rhythm.      Pulses: Normal pulses.      Heart sounds: Normal heart sounds.   Pulmonary:      Effort: Pulmonary effort is normal.      Breath sounds: Normal breath sounds.   Abdominal:      General: Bowel sounds are normal.      Palpations: Abdomen is soft.   Musculoskeletal:      Cervical back: Normal range of motion.   Skin:     General: Skin is warm.   Neurological:      General: No focal deficit present.      Mental Status: She is alert and oriented to person, place, and time.   Psychiatric:         Mood and Affect: Mood normal.          Laboratory  Lab Results   Component Value Date    WBC 4.61 08/06/2024    RBC 3.90 (L) 08/06/2024    HGB 12.2 08/06/2024    HCT 35.6 (L) 08/06/2024    MCV 91 08/06/2024    MCH 31.3 (H) 08/06/2024    MCHC 34.3 08/06/2024    RDW 12.1 08/06/2024     08/06/2024    MPV 10.9 08/06/2024    GRAN 3.8 08/06/2024    GRAN 82.8 (H) 08/06/2024    LYMPH 0.6 (L) 08/06/2024    LYMPH 12.4 (L) 08/06/2024    MONO 0.2 (L) 08/06/2024    MONO 3.7 (L) 08/06/2024    EOS 0.0 08/06/2024    BASO 0.01 08/06/2024    EOSINOPHIL 0.0 08/06/2024    BASOPHIL 0.2 08/06/2024       BMP  Lab Results   Component Value Date     (L) 08/06/2024    K 4.2 08/06/2024    CL 97 08/06/2024    CO2 25 08/06/2024    BUN 16 08/06/2024    CREATININE 0.8 08/06/2024    CALCIUM 8.1 (L) 08/06/2024    ANIONGAP 9 08/06/2024 " "   ESTGFRAFMARKOA 86 09/23/2021    AST 40 08/05/2024    ALT 36 08/05/2024    PROT 5.6 (L) 08/05/2024          No results found for: "IGE"     Lab Results   Component Value Date    ASPERGILLUS Not Detected 08/05/2024     No results found for: "AFUMIGATUSCL"     No results found for: "ACE"     Diagnostic Results:  I have personally reviewed today the following studies:    CT Chest Without Contrast  Narrative: EXAMINATION:  CT CHEST WITHOUT CONTRAST    CLINICAL HISTORY:  Bronchiectasis, uncomplicatedRML atelectasis;    TECHNIQUE:  Axial CT images performed through the chest without intravenous contrast.    COMPARISON:  08/04/2024    FINDINGS:  Progression of the tree-in-bud micronodular opacities in the upper lobes.  The micro nodular opacities with bronchiectasis and right middle lobe atelectasis is otherwise stable in the lower lobes, lingula, and right middle lobe.  No effusions.  No thoracic adenopathy.    The upper abdominal visualized organs are within normal limits.    The bones are intact.  Impression: Progression of the tree-in-bud micronodular opacities specifically in the upper lobes characteristic of an indolent infectious bronchiolitis such as DORI.    All CT scans at this facility are performed  using dose modulation techniques as appropriate to performed exam including the following:  automated exposure control; adjustment of mA and/or kV according to the patients size (this includes techniques or standardized protocols for targeted exams where dose is matched to indication/reason for exam: i.e. extremities or head);  iterative reconstruction technique.    Electronically signed by: Amadeo Avila MD  Date:    10/28/2024  Time:    10:53        Final Pathologic Diagnosis 1 - LUNG, RIGHT MIDDLE LOBE, BRONCHOALVEOLAR LAVAGE (BAL) WITH CYTOLOGY AND CELL BLOCK:    Negative for malignant cells.  - Abundant acute inflammation and macrophages.  See comment.    Comment:  Correlate with microbiology cultures.   Comment: " Interp By Ash Yeboah M.D., Signed on 11/08/2024 at 18:17   Disclaimer Screening was performed at [facility], [address] [place] [address].   Resulting Agency [facility]     Assessment/Plan:     Problem List Items Addressed This Visit       Primary hypertension    Hyperlipidemia    Hypothyroidism    Diabetes mellitus type 2, noninsulin dependent    Adult bronchiectasis - Primary    Relevant Orders    Ambulatory referral/consult to Infectious Disease    Right middle lobe syndrome    Relevant Orders    Ambulatory referral/consult to Infectious Disease    Redness of left eye     Painless red eye         DORI (mycobacterium avium-intracellulare)     Referral to Infectious diseases   Final ID pending  We will take up to 8-12 weeks         Relevant Orders    Ambulatory referral/consult to Infectious Disease              Follow up in about 3 months (around 3/10/2025), or ref to ID.    This note was prepared using voice recognition system and is likely to have sound alike errors that may have been overlooked even after proof reading.  Please call me with any questions    Discussed diagnosis, its evaluation, treatment and usual course. All questions answered.    Thank you for the courtesy of participating in the care of this patient    Brian Aldrich MD      Personal Diagnostic Review  []  CXR    []  ECHO    []  ONSAT    []  6MWD    []  LABS    []  CHEST CT    []  PET CT    []  Biopsy results

## 2024-12-10 ENCOUNTER — OFFICE VISIT (OUTPATIENT)
Dept: PULMONOLOGY | Facility: CLINIC | Age: 79
End: 2024-12-10
Payer: MEDICARE

## 2024-12-10 VITALS
WEIGHT: 117.63 LBS | RESPIRATION RATE: 20 BRPM | HEART RATE: 95 BPM | BODY MASS INDEX: 17.83 KG/M2 | OXYGEN SATURATION: 99 % | DIASTOLIC BLOOD PRESSURE: 60 MMHG | SYSTOLIC BLOOD PRESSURE: 125 MMHG | HEIGHT: 68 IN

## 2024-12-10 DIAGNOSIS — H57.89 REDNESS OF LEFT EYE: ICD-10-CM

## 2024-12-10 DIAGNOSIS — E11.9 DIABETES MELLITUS TYPE 2, NONINSULIN DEPENDENT: ICD-10-CM

## 2024-12-10 DIAGNOSIS — I10 PRIMARY HYPERTENSION: ICD-10-CM

## 2024-12-10 DIAGNOSIS — E78.49 OTHER HYPERLIPIDEMIA: ICD-10-CM

## 2024-12-10 DIAGNOSIS — E03.9 HYPOTHYROIDISM, UNSPECIFIED TYPE: ICD-10-CM

## 2024-12-10 DIAGNOSIS — J98.19 RIGHT MIDDLE LOBE SYNDROME: ICD-10-CM

## 2024-12-10 DIAGNOSIS — J47.9 ADULT BRONCHIECTASIS: Primary | ICD-10-CM

## 2024-12-10 DIAGNOSIS — A31.0 MAI (MYCOBACTERIUM AVIUM-INTRACELLULARE): ICD-10-CM

## 2024-12-10 PROCEDURE — 99999 PR PBB SHADOW E&M-EST. PATIENT-LVL V: CPT | Mod: PBBFAC,,, | Performed by: INTERNAL MEDICINE

## 2024-12-10 PROCEDURE — 99215 OFFICE O/P EST HI 40 MIN: CPT | Mod: PBBFAC | Performed by: INTERNAL MEDICINE

## 2024-12-10 RX ORDER — BLOOD-GLUCOSE METER
EACH MISCELLANEOUS
COMMUNITY
Start: 2024-10-26

## 2024-12-10 RX ORDER — TRIAMCINOLONE ACETONIDE 1 MG/G
CREAM TOPICAL
COMMUNITY
Start: 2024-11-11 | End: 2025-11-11

## 2024-12-19 ENCOUNTER — HOSPITAL ENCOUNTER (EMERGENCY)
Facility: HOSPITAL | Age: 79
Discharge: HOME | End: 2024-12-19
Attending: EMERGENCY MEDICINE
Payer: MEDICARE

## 2024-12-19 VITALS
BODY MASS INDEX: 17.41 KG/M2 | WEIGHT: 114.5 LBS | OXYGEN SATURATION: 92 % | DIASTOLIC BLOOD PRESSURE: 58 MMHG | TEMPERATURE: 98 F | HEART RATE: 78 BPM | RESPIRATION RATE: 24 BRPM | SYSTOLIC BLOOD PRESSURE: 109 MMHG

## 2024-12-19 DIAGNOSIS — R41.82 ALTERED MENTAL STATUS: ICD-10-CM

## 2024-12-19 DIAGNOSIS — T50.905A MEDICATION SIDE EFFECT, INITIAL ENCOUNTER: Primary | ICD-10-CM

## 2024-12-19 LAB
ALBUMIN SERPL BCP-MCNC: 3.8 G/DL (ref 3.5–5.2)
ALP SERPL-CCNC: 64 U/L (ref 40–150)
ALT SERPL W/O P-5'-P-CCNC: 45 U/L (ref 10–44)
ANION GAP SERPL CALC-SCNC: 12 MMOL/L (ref 8–16)
AST SERPL-CCNC: 38 U/L (ref 10–40)
BASOPHILS # BLD AUTO: 0.01 K/UL (ref 0–0.2)
BASOPHILS NFR BLD: 0.2 % (ref 0–1.9)
BILIRUB SERPL-MCNC: 2.3 MG/DL (ref 0.1–1)
BILIRUB UR QL STRIP: NEGATIVE
BNP SERPL-MCNC: 33 PG/ML (ref 0–99)
BUN SERPL-MCNC: 14 MG/DL (ref 8–23)
CALCIUM SERPL-MCNC: 9.1 MG/DL (ref 8.7–10.5)
CHLORIDE SERPL-SCNC: 96 MMOL/L (ref 95–110)
CLARITY UR: CLEAR
CO2 SERPL-SCNC: 24 MMOL/L (ref 23–29)
COLOR UR: YELLOW
CREAT SERPL-MCNC: 0.8 MG/DL (ref 0.5–1.4)
DIFFERENTIAL METHOD BLD: ABNORMAL
EOSINOPHIL # BLD AUTO: 0 K/UL (ref 0–0.5)
EOSINOPHIL NFR BLD: 0.2 % (ref 0–8)
ERYTHROCYTE [DISTWIDTH] IN BLOOD BY AUTOMATED COUNT: 12.7 % (ref 11.5–14.5)
EST. GFR  (NO RACE VARIABLE): >60 ML/MIN/1.73 M^2
GLUCOSE SERPL-MCNC: 183 MG/DL (ref 70–110)
GLUCOSE UR QL STRIP: NEGATIVE
HCT VFR BLD AUTO: 41.3 % (ref 37–48.5)
HGB BLD-MCNC: 13.7 G/DL (ref 12–16)
HGB UR QL STRIP: NEGATIVE
IMM GRANULOCYTES # BLD AUTO: 0.02 K/UL (ref 0–0.04)
IMM GRANULOCYTES NFR BLD AUTO: 0.4 % (ref 0–0.5)
KETONES UR QL STRIP: ABNORMAL
LEUKOCYTE ESTERASE UR QL STRIP: NEGATIVE
LYMPHOCYTES # BLD AUTO: 0.1 K/UL (ref 1–4.8)
LYMPHOCYTES NFR BLD: 1.8 % (ref 18–48)
MCH RBC QN AUTO: 31 PG (ref 27–31)
MCHC RBC AUTO-ENTMCNC: 33.2 G/DL (ref 32–36)
MCV RBC AUTO: 93 FL (ref 82–98)
MONOCYTES # BLD AUTO: 0.1 K/UL (ref 0.3–1)
MONOCYTES NFR BLD: 1.2 % (ref 4–15)
NEUTROPHILS # BLD AUTO: 5.5 K/UL (ref 1.8–7.7)
NEUTROPHILS NFR BLD: 96.2 % (ref 38–73)
NITRITE UR QL STRIP: NEGATIVE
NRBC BLD-RTO: 0 /100 WBC
PH UR STRIP: 6 [PH] (ref 5–8)
PLATELET # BLD AUTO: 161 K/UL (ref 150–450)
PMV BLD AUTO: 10.3 FL (ref 9.2–12.9)
POTASSIUM SERPL-SCNC: 3.7 MMOL/L (ref 3.5–5.1)
PROT SERPL-MCNC: 6.2 G/DL (ref 6–8.4)
PROT UR QL STRIP: ABNORMAL
RBC # BLD AUTO: 4.42 M/UL (ref 4–5.4)
SODIUM SERPL-SCNC: 132 MMOL/L (ref 136–145)
SP GR UR STRIP: 1.01 (ref 1–1.03)
TROPONIN I SERPL DL<=0.01 NG/ML-MCNC: 0.01 NG/ML (ref 0–0.03)
URN SPEC COLLECT METH UR: ABNORMAL
UROBILINOGEN UR STRIP-ACNC: NEGATIVE EU/DL
WBC # BLD AUTO: 5.69 K/UL (ref 3.9–12.7)

## 2024-12-19 PROCEDURE — 85025 COMPLETE CBC W/AUTO DIFF WBC: CPT

## 2024-12-19 PROCEDURE — 99285 EMERGENCY DEPT VISIT HI MDM: CPT | Mod: 25

## 2024-12-19 PROCEDURE — 81003 URINALYSIS AUTO W/O SCOPE: CPT

## 2024-12-19 PROCEDURE — 93010 ELECTROCARDIOGRAM REPORT: CPT | Mod: ,,, | Performed by: INTERNAL MEDICINE

## 2024-12-19 PROCEDURE — 93005 ELECTROCARDIOGRAM TRACING: CPT

## 2024-12-19 PROCEDURE — 83880 ASSAY OF NATRIURETIC PEPTIDE: CPT

## 2024-12-19 PROCEDURE — 80053 COMPREHEN METABOLIC PANEL: CPT

## 2024-12-19 PROCEDURE — 84484 ASSAY OF TROPONIN QUANT: CPT

## 2024-12-19 NOTE — FIRST PROVIDER EVALUATION
Medical screening examination initiated.  I have conducted a focused provider triage encounter, findings are as follows:    Brief history of present illness:  79-year-old female presents to the emergency department chief complaint of altered mental status.   reports she test positive for COVID and strep this morning.  Reports she began taking Paxlovid at home today, reports that she has been altered since.  Reports that last time she tested positive for COVID she had elevated heart enzymes.    Vitals:    12/19/24 1637   BP: (!) 104/58   Pulse: 101   Resp: 20   Temp: 97.8 °F (36.6 °C)   TempSrc: Oral   SpO2: 95%   Weight: 51.9 kg (114 lb 8 oz)       Pertinent physical exam:  AMS    Brief workup plan:  workup    Preliminary workup initiated; this workup will be continued and followed by the physician or advanced practice provider that is assigned to the patient when roomed.

## 2024-12-20 ENCOUNTER — TELEPHONE (OUTPATIENT)
Dept: ADMINISTRATIVE | Facility: CLINIC | Age: 79
End: 2024-12-20
Payer: MEDICARE

## 2024-12-20 NOTE — ED PROVIDER NOTES
Encounter Date: 12/19/2024       History     Chief Complaint   Patient presents with    General Illness     Dx. With COVID and strept throat this am. Patient started shaking all over 30 minutes status post taking Paxlovid. Patient thinks she may have had a seizure. Patient AAOX4 at present, denies any compliants.      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  She was tested positive earlier today for COVID in his started on both Paxlovid Phenergan cough syrup and Omnicef.  Patient has a similar episode remotely with albuterol.  Patient did not lose consciousness during her episode which lasted approximately 10 minutes.  She has no fevers or chills.  She denies any shortness of breath.  She is not having chest pain.  She did have a history of admission for COVID remotely due to an elevated troponin.  She is pending follow up at the [facility] for her MAC pneumonia    Review of patient's allergies indicates:   Allergen Reactions    Adhesive tape-silicones     Latex, natural rubber     Albuterol Anxiety, Other (See Comments) and Palpitations     Past Medical History:   Diagnosis Date    Diabetes mellitus     Hypercholesteremia     Hypertension     Hypothyroidism     Osteopenia     Endocrinologist treating    Pneumonia      Past Surgical History:   Procedure Laterality Date    BREAST BIOPSY      BREAST CYST ASPIRATION      BRONCHOSCOPY Bilateral 11/6/2024    Procedure: Bronchoscopy - insert lighted tube into airway to take a biopsy of lung;  Surgeon: Brian Aldrich MD;  Location: [facility];  Service: Pulmonary;  Laterality: Bilateral;    [facility]COPY  2016    [facility]    ENDOSCOPY       Family History   Problem Relation Name Age of Onset    Colon cancer Father  86    Hodgkin's lymphoma Father       Social History     Tobacco Use    Smoking status: Never    Smokeless tobacco: Never    Substance Use Topics    Alcohol use: Never    Drug use: Never     Review of Systems   Constitutional:  Positive for chills. Negative for fever.   HENT:  Positive for congestion, rhinorrhea and sore throat.    Respiratory:  Positive for cough. Negative for shortness of breath.    Cardiovascular:  Negative for chest pain and palpitations.   Neurological:  Positive for tremors and seizures (Describes rigors.  No loss of consciousness.). Negative for weakness and numbness.       Physical Exam     Initial Vitals [12/19/24 1637]   BP Pulse Resp Temp SpO2   (!) 104/58 101 20 97.8 °F (36.6 °C) 95 %      MAP       --         Physical Exam  Nursing Notes and Vital Signs Reviewed.  Constitutional: Patient is in no acute distress. Well-developed and well-nourished.  Head: Atraumatic. Normocephalic.  Eyes:  EOM intact.  No scleral icterus.  ENT: Mucous membranes are moist.  Nares clear   Neck:  Full ROM. No JVD.  Cardiovascular: Regular rate. Regular rhythm No murmurs, rubs, or gallops. Distal pulses are 2+ and symmetric  Pulmonary/Chest: No respiratory distress. Clear to auscultation bilaterally. No wheezing or rales.  Equal chest wall rise bilaterally  Abdominal: Soft and non-distended.  There is no tenderness.  No rebound, guarding, or rigidity. Good bowel sounds.  Genitourinary: No CVA tenderness.  No suprapubic tenderness  Musculoskeletal: Moves all extremities. No obvious deformities.  5 x 5 strength in all extremities   Skin: Warm and dry.  Neurological:  Alert, awake, and appropriate.  Normal speech.  No acute focal neurological deficits are appreciated.  Two through 12 intact bilaterally.  Psychiatric: Normal affect. Good eye contact. Appropriate in content.    ED Course   Procedures  Labs Reviewed   CBC W/ AUTO DIFFERENTIAL - Abnormal       Result Value    WBC 5.69      RBC 4.42      Hemoglobin 13.7      Hematocrit 41.3      MCV 93      MCH 31.0      MCHC 33.2      RDW 12.7      Platelets 161      MPV 10.3       Immature Granulocytes 0.4      Gran # (ANC) 5.5      Immature Grans (Abs) 0.02      Lymph # 0.1 (*)     Mono # 0.1 (*)     Eos # 0.0      Baso # 0.01      nRBC 0      Gran % 96.2 (*)     Lymph % 1.8 (*)     Mono % 1.2 (*)     Eosinophil % 0.2      Basophil % 0.2      Differential Method Automated     COMPREHENSIVE METABOLIC PANEL - Abnormal    Sodium 132 (*)     Potassium 3.7      Chloride 96      CO2 24      Glucose 183 (*)     BUN 14      Creatinine 0.8      Calcium 9.1      Total Protein 6.2      Albumin 3.8      Total Bilirubin 2.3 (*)     Alkaline Phosphatase 64      AST 38      ALT 45 (*)     eGFR >60      Anion Gap 12     URINALYSIS, REFLEX TO URINE CULTURE - Abnormal    Specimen UA Urine, Clean Catch      Color, UA Yellow      Appearance, UA Clear      pH, UA 6.0      Specific Gravity, UA 1.015      Protein, UA Trace (*)     Glucose, UA Negative      Ketones, UA 1+ (*)     Bilirubin (UA) Negative      Occult Blood UA Negative      Nitrite, UA Negative      Urobilinogen, UA Negative      Leukocytes, UA Negative      Narrative:     Specimen Source->Urine   TROPONIN I    Troponin I 0.009     B-TYPE NATRIURETIC PEPTIDE    BNP 33       EKG Readings: (Independently Interpreted)   Initial Reading: No STEMI. Rhythm: Normal Sinus Rhythm. Heart Rate: 92. ST Segments: Normal ST Segments. T Waves: Normal.   Normal sinus rhythm.  Rate 92.  No STEMI.  Normal EKG       Imaging Results              CT Head Without Contrast (Final result)  Result time 12/19/24 17:05:10      Final result by Lee Marrero MD (12/19/24 17:05:10)                   Impression:      No acute abnormality.    Atrophy and chronic white matter changes    All CT scans   are performed using dose optimization techniques including the following: automated exposure control; adjustment of the mA and/or kV; use of iterative reconstruction technique.  Dose modulation was employed for ALARA by means of: Automated exposure control; adjustment of the mA and/or  kV according to patient size (this includes techniques or standardized protocols for targeted exams where dose is matched to indication/reason for exam; i.e. extremities or head); and/or use of iterative reconstructive technique.      Electronically signed by: Lee Marrero  Date:    12/19/2024  Time:    17:05               Narrative:    EXAMINATION:  CT HEAD WITHOUT CONTRAST    CLINICAL HISTORY:  Mental status change, unknown cause;    TECHNIQUE:  Low dose axial CT images obtained throughout the head without intravenous contrast. Sagittal and coronal reconstructions were performed.    COMPARISON:  None.    FINDINGS:  Atrophy and chronic white matter changes    Ventricles and sulci are normal in size for age without evidence of hydrocephalus. No extra-axial blood or fluid collections.    No parenchymal mass, hemorrhage, edema or major vascular distribution infarct.    Skull/extracranial contents (limited evaluation): No fracture. Mastoid air cells and paranasal sinuses are essentially clear.                                       X-Ray Chest AP Portable (Final result)  Result time 12/19/24 17:07:37      Final result by Lee Marrero MD (12/19/24 17:07:37)                   Impression:      Mild basilar opacities in the lung bases better seen as bronchiectasis with mucous plugging on prior CT. Superimposed infectious process not excluded.      Electronically signed by: Lee Marrero  Date:    12/19/2024  Time:    17:07               Narrative:    EXAMINATION:  XR CHEST AP PORTABLE    CLINICAL HISTORY:  Altered mental status, unspecified    TECHNIQUE:  Single frontal view of the chest was performed.    COMPARISON:  Prior CT    FINDINGS:  Mild basilar opacities in the lung bases better seen as bronchiectasis with mucous plugging on prior CT.  Superimposed infectious process not excluded.    The cardiac silhouette is normal in size. The hilar and mediastinal contours are unremarkable.    Bones are intact.                                        Medications - No data to display  Medical Decision Making  Differential diagnosis: Seizure, rigors, tremors, medication side effect    Patient presents complaining of shakes after taking multiple medications including Paxlovid earlier today for positive COVID and strep at home.  Patient has a loss of consciousness negative CT.  Workup here is benign including a troponin which was a major concern due her admission for COVID remotely with an elevated troponin.  I have discontinued the Paxlovid.  I have recommended continue other home medications follow up with her doctor in 1-2 days for re-evaluation.  She is to return as needed for any worsening symptoms, problems, questions or concerns    Amount and/or Complexity of Data Reviewed  Independent Historian: spouse and friend     Details:  and daughter at bedside providing significant history  External Data Reviewed: labs and notes.     Details: I have reviewed her lab studies as well as her note from her primary care provider today  Labs: ordered. Decision-making details documented in ED Course.     Details: Troponin and BNP are normal.  UA is negative.  CMP shows a sodium 132 and a glucose of 183 but otherwise benign.  Normal white count.  Normal H&H  Radiology: ordered. Decision-making details documented in ED Course.     Details: Head CT is negative.  Chest x-ray shows mild basilar opacities in the lung bases better seen as bronchiectasis with mucous plugging on prior CT.  Per my for suspected this is consistent with a history of MAC  ECG/medicine tests: ordered and independent interpretation performed. Decision-making details documented in ED Course.     Details: Normal sinus rhythm    Risk  OTC drugs.  Prescription drug management.  Decision regarding hospitalization.                                      Clinical Impression:  Final diagnoses:  [R41.82] Altered mental status  [T50.905A] Medication side effect, initial encounter  (Primary)          ED Disposition Condition    Discharge Stable          ED Prescriptions    None       Follow-up Information       Follow up With Specialties Details Why Contact Info    Cory Gibbons MD Internal Medicine   [address]  [address]  [facility] [address] [phone]               Matias Rogers Jr., MD  12/19/24 4111

## 2024-12-20 NOTE — DISCHARGE INSTRUCTIONS
Discontinue your Paxlovid.  Continue other home medications.  Follow up with her doctor in 1-2 days for re-evaluation return as needed

## 2024-12-20 NOTE — PROGRESS NOTES
Follow Up Visit Note       Active Problems      1. Varicose veins of bilateral lower extremities with pain          Chief Complaint   Patient presents with:  Varicose Veins: est pt 6wk f/u after wearing compression stockings.  PT states that he hasn't notic Opened in error.   Packs/day: 1.00        Years: 10.00        Pack years: 8        Quit date: 2009        Years since quittin.5      Smokeless tobacco: Never Used    Vaping Use      Vaping Use: Never used    Substance and Sexual Activity      Alcohol use:  Yes disturbance. Physical Findings   /78   Pulse 69   Temp 97.9 °F (36.6 °C)   Ht 69.5\"   Wt 202 lb (91.6 kg)   BMI 29.40 kg/m²   Physical Exam  Constitutional:       Appearance: He is well-developed.    HENT:      Head: Normocephalic and atraumat more symptomatic, I would recommend proceeding with that side first.    Brittni Santos MD

## 2024-12-22 LAB
OHS QRS DURATION: 84 MS
OHS QTC CALCULATION: 457 MS

## 2024-12-27 ENCOUNTER — HOSPITAL ENCOUNTER (OUTPATIENT)
Dept: RADIOLOGY | Facility: CLINIC | Age: 79
Discharge: HOME | End: 2024-12-27
Attending: NURSE PRACTITIONER
Payer: MEDICARE

## 2024-12-27 ENCOUNTER — OFFICE VISIT (OUTPATIENT)
Dept: URGENT CARE | Facility: CLINIC | Age: 79
End: 2024-12-27
Payer: MEDICARE

## 2024-12-27 VITALS
HEIGHT: 68 IN | DIASTOLIC BLOOD PRESSURE: 82 MMHG | TEMPERATURE: 98 F | HEART RATE: 96 BPM | WEIGHT: 116.06 LBS | BODY MASS INDEX: 17.59 KG/M2 | SYSTOLIC BLOOD PRESSURE: 118 MMHG | RESPIRATION RATE: 18 BRPM | OXYGEN SATURATION: 93 %

## 2024-12-27 DIAGNOSIS — Z87.01 HISTORY OF PNEUMONIA: ICD-10-CM

## 2024-12-27 DIAGNOSIS — Z86.16 HISTORY OF COVID-19: ICD-10-CM

## 2024-12-27 DIAGNOSIS — R05.9 COUGH, UNSPECIFIED TYPE: ICD-10-CM

## 2024-12-27 DIAGNOSIS — R05.9 COUGH, UNSPECIFIED TYPE: Primary | ICD-10-CM

## 2024-12-27 PROCEDURE — 71046 X-RAY EXAM CHEST 2 VIEWS: CPT | Mod: S$GLB,,, | Performed by: RADIOLOGY

## 2024-12-27 RX ORDER — CEFDINIR 300 MG/1
300 CAPSULE ORAL
COMMUNITY
Start: 2024-12-19 | End: 2024-12-29

## 2024-12-27 RX ORDER — PROMETHAZINE HYDROCHLORIDE AND DEXTROMETHORPHAN HYDROBROMIDE 6.25; 15 MG/5ML; MG/5ML
5 SYRUP ORAL 4 TIMES DAILY PRN
COMMUNITY
Start: 2024-12-19

## 2024-12-27 RX ORDER — BENZONATATE 200 MG/1
200 CAPSULE ORAL 3 TIMES DAILY PRN
Qty: 25 CAPSULE | Refills: 0 | Status: SHIPPED | OUTPATIENT
Start: 2024-12-27 | End: 2025-01-06

## 2024-12-27 NOTE — PROGRESS NOTES
"  Subjective:      Patient ID: Nehal Jimenez is a 79 y.o. female.    Vitals:  height is 5' 8" (1.727 m) and weight is 52.7 kg (116 lb 1.2 oz). Her tympanic temperature is 97.8 °F (36.6 °C). Her blood pressure is 118/82 and her pulse is 96. Her respiration is 18 and oxygen saturation is 93% (abnormal).     Chief Complaint: Cough    Patient presents with a cough post Covid Dx on 12/19.  She was given promethazine and cefdinir and paxlovid..  she states she feels like she should not have the cough still.  She does see a pulmonologist for lung issues.   Coughing up greenish sputum periodically.  She stopped the promethazine and started OTC cough meds    Cough  This is a new problem. The current episode started 1 to 4 weeks ago. The problem has been unchanged. The problem occurs hourly. The cough is Productive of sputum. Pertinent negatives include no chest pain, chills, ear congestion, ear pain, fever, headaches, heartburn, hemoptysis, myalgias, nasal congestion, postnasal drip, rash, rhinorrhea, sore throat, shortness of breath, sweats, weight loss or wheezing. Nothing aggravates the symptoms. She has tried prescription cough suppressant and OTC cough suppressant (cefdinir) for the symptoms. The treatment provided mild relief. There is no history of asthma, bronchiectasis, bronchitis, COPD, emphysema, environmental allergies or pneumonia.       Constitution: Negative for chills and fever.   HENT:  Negative for ear pain, postnasal drip and sore throat.    Cardiovascular:  Negative for chest pain.   Respiratory:  Positive for cough and sputum production. Negative for bloody sputum, shortness of breath and wheezing.    Gastrointestinal:  Negative for heartburn.   Musculoskeletal:  Negative for muscle ache.   Skin:  Negative for rash.   Allergic/Immunologic: Negative for environmental allergies.   Neurological:  Negative for headaches.      Objective:     Physical Exam   Constitutional: She is oriented to person, place, " and time. She appears well-developed. She is cooperative.  Non-toxic appearance. She does not appear ill. No distress.   HENT:   Head: Normocephalic and atraumatic.   Ears:   Right Ear: Hearing and external ear normal.   Left Ear: Hearing and external ear normal.   Nose: Nose normal. No mucosal edema, rhinorrhea, nasal deformity or congestion. No epistaxis. Right sinus exhibits no maxillary sinus tenderness and no frontal sinus tenderness. Left sinus exhibits no maxillary sinus tenderness and no frontal sinus tenderness.   Mouth/Throat: Uvula is midline, oropharynx is clear and moist and mucous membranes are normal. No trismus in the jaw. Normal dentition. No uvula swelling. No oropharyngeal exudate, posterior oropharyngeal edema or posterior oropharyngeal erythema.   Eyes: Conjunctivae and lids are normal. No scleral icterus.   Neck: Trachea normal and phonation normal. Neck supple. No edema present. No erythema present. No neck rigidity present.   Cardiovascular: Normal rate, regular rhythm, normal heart sounds and normal pulses.   Pulmonary/Chest: Effort normal and breath sounds normal. No respiratory distress. She has no decreased breath sounds. She has no wheezes. She has no rhonchi. She has no rales.   Abdominal: Normal appearance.   Musculoskeletal: Normal range of motion.         General: No deformity. Normal range of motion.   Neurological: She is alert and oriented to person, place, and time. She exhibits normal muscle tone. Coordination normal.   Skin: Skin is warm, dry, intact, not diaphoretic and not pale.   Psychiatric: Her speech is normal and behavior is normal. Judgment and thought content normal.   Nursing note and vitals reviewed.      Assessment:     1. Cough, unspecified type    2. History of COVID-19    3. History of pneumonia        Plan:       Cough, unspecified type  -     XR CHEST PA AND LATERAL; Future; Expected date: 12/27/2024    History of COVID-19    History of pneumonia    Other  orders  -     benzonatate (TESSALON) 200 MG capsule; Take 1 capsule (200 mg total) by mouth 3 (three) times daily as needed for Cough.  Dispense: 25 capsule; Refill: 0    XR CHEST PA AND LATERAL    Result Date: 12/27/2024  EXAM: XR CHEST PA AND LATERAL CLINICAL HISTORY: Cough. FINDINGS:  Comparison made to prior chest x-ray dated 12/19/2024.  Subsegmental atelectasis and/or infiltrate in the anterior segment right upper lobe along the minor fissure.  COPD.  No pleural effusion or pneumothorax or pulmonary edema.  Normal heart size.      See above Finalized on: 12/27/2024 5:38 PM By:  Bran Boyd MD [facility]# [id number]      2024-12-27 17:[id number]    [facility]    X-Ray Chest AP Portable    Result Date: 12/19/2024  EXAMINATION: XR CHEST AP PORTABLE CLINICAL HISTORY: Altered mental status, unspecified TECHNIQUE: Single frontal view of the chest was performed. COMPARISON: Prior CT FINDINGS: Mild basilar opacities in the lung bases better seen as bronchiectasis with mucous plugging on prior CT.  Superimposed infectious process not excluded. The cardiac silhouette is normal in size. The hilar and mediastinal contours are unremarkable. Bones are intact.     Mild basilar opacities in the lung bases better seen as bronchiectasis with mucous plugging on prior CT. Superimposed infectious process not excluded. Electronically signed by: Lee Marrero Date:    12/19/2024 Time:    17:07          Medical Decision Making:   Initial Assessment:   After complete evaluation, including thorough history and physical exam, the patient's symptoms are most likely due to viral upper respiratory infection. There are no concerning features on physical exam to suggest bacterial otitis media/externa, sinusitis, strep pharyngitis, or peritonsillar abscess. Vital signs do not suggest sepsis. Lung sounds are clear and not consistent with pneumonia. There is no neck pain or limited ROM to suggest retropharyngeal abscess or meningitis. In clinic reported  covid  was positive on 12/19/24.The patient will be treated with supportive care. Will provide RX for tessalon upon D/C. Follow up instructions and ED precautions provided.     Patient has history of respiratory disease (copd/asthma/ALLEN/CPaP use) noted to have increase risk of pneumonia (bacterial infection) development with acute respiratory illness. Patient was advised close monitoring and is to continue current medications including inhalers. Patient to follow up with PCP and given strict ER precautions.            Patient Instructions   COUGH  Rest and fluids are important    You can use honey with iva to soothe your throat    Take prescription cough meds (pills) as prescribed.  Do not take both the prescribed cough pills and syrup at the same time.     Tylenol or ibuprofen can also be used as directed for pain unless you have an allergy to them or medical condition such as stomach ulcers, kidney or liver disease or blood thinners etc for which you should not be taking these type of medications.     Please follow up with your primary care doctor or specialist in the next 48-72hrs as needed and if no improvement    If your condition worsens or fails to improve we recommend that you receive another evaluation at the ER immediately or contact your PCP to discuss your concerns or return here. You must understand that you've received an urgent care treatment only and that you may be released before all your medical problems are known or treated. You the patient will arrange for follouwp care as instructed. .

## 2024-12-27 NOTE — PATIENT INSTRUCTIONS
COUGH  Rest and fluids are important    You can use honey with iva to soothe your throat    Take prescription cough meds (pills) as prescribed.  Do not take both the prescribed cough pills and syrup at the same time.     Tylenol or ibuprofen can also be used as directed for pain unless you have an allergy to them or medical condition such as stomach ulcers, kidney or liver disease or blood thinners etc for which you should not be taking these type of medications.     Please follow up with your primary care doctor or specialist in the next 48-72hrs as needed and if no improvement    If your condition worsens or fails to improve we recommend that you receive another evaluation at the ER immediately or contact your PCP to discuss your concerns or return here. You must understand that you've received an urgent care treatment only and that you may be released before all your medical problems are known or treated. You the patient will arrange for follouwp care as instructed. .

## 2025-01-06 ENCOUNTER — OFFICE VISIT (OUTPATIENT)
Dept: URGENT CARE | Facility: CLINIC | Age: 80
End: 2025-01-06
Payer: MEDICARE

## 2025-01-06 VITALS
SYSTOLIC BLOOD PRESSURE: 146 MMHG | WEIGHT: 255.75 LBS | RESPIRATION RATE: 17 BRPM | TEMPERATURE: 98 F | DIASTOLIC BLOOD PRESSURE: 70 MMHG | HEART RATE: 92 BPM | BODY MASS INDEX: 38.76 KG/M2 | OXYGEN SATURATION: 93 % | HEIGHT: 68 IN

## 2025-01-06 DIAGNOSIS — R05.3 PERSISTENT COUGH: Primary | ICD-10-CM

## 2025-01-06 DIAGNOSIS — Z86.16 HISTORY OF COVID-19: ICD-10-CM

## 2025-01-06 DIAGNOSIS — Z87.01 HISTORY OF PNEUMONIA: ICD-10-CM

## 2025-01-06 PROCEDURE — 99213 OFFICE O/P EST LOW 20 MIN: CPT | Mod: S$GLB,,,

## 2025-01-06 RX ORDER — PREDNISONE 10 MG/1
TABLET ORAL
Qty: 14 TABLET | Refills: 0 | Status: SHIPPED | OUTPATIENT
Start: 2025-01-06

## 2025-01-06 NOTE — PROGRESS NOTES
"Subjective:      Patient ID: Nehal Jimenez is a 79 y.o. female.    Vitals:  height is 5' 8" (1.727 m) and weight is 116 kg (255 lb 11.7 oz). Her tympanic temperature is 97.5 °F (36.4 °C). Her blood pressure is 146/70 (abnormal) and her pulse is 92. Her respiration is 17 and oxygen saturation is 92% (abnormal).     Chief Complaint: Cough    78 yo female Presents with intermittently productive cough. Symptoms present since 12/19/24. States she was covid positive on 12/19/24 and states she had strep at the same time. Discontinued Paxlovid due to tremors/shaking. States finished antibiotics for strep. Was also taking promethazine cough syrup which was finished. States she recently came back to urgent care for lasting cough and vhad chest XR and tessalon pears prescribed. No relief and states she discontinued the Pearls. Sees Pulmonologist in March and ID at end of month. States O2 at home usually does not pass 94% on room air. Denies fever, chills, sore throat, ear pain, chest tightness, wheezing, shortness of breath. Allergic to paxlovid, albuterol, latex.    Cough  This is a recurrent problem. The current episode started in the past 7 days. The problem has been unchanged. The problem occurs constantly. Pertinent negatives include no chest pain, chills, ear pain, fever, headaches, sore throat, shortness of breath or wheezing. Nothing aggravates the symptoms. Treatments tried: tessalon pearls and promethazine. The treatment provided no relief.       Constitution: Negative for chills and fever.   HENT:  Negative for ear pain, congestion and sore throat.    Neck: Negative for neck pain and neck stiffness.   Cardiovascular:  Negative for chest pain.   Respiratory:  Positive for cough and sputum production. Negative for sleep apnea, chest tightness, COPD, shortness of breath, stridor, wheezing and asthma.    Musculoskeletal:  Negative for muscle cramps.   Allergic/Immunologic: Negative for asthma.   Neurological:  " Negative for headaches, numbness and seizures.      Objective:     Vitals:    01/06/25 0921   BP: (!) 146/70   Pulse: 92   Resp: 17   Temp: 97.5 °F (36.4 °C)       Physical Exam   Constitutional: She is oriented to person, place, and time. She appears well-developed. She is cooperative.  Non-toxic appearance. She does not appear ill. No distress.   HENT:   Head: Normocephalic and atraumatic.   Ears:   Right Ear: Hearing, tympanic membrane, external ear and ear canal normal. Tympanic membrane is not erythematous and not bulging. no impacted cerumen  Left Ear: Hearing, tympanic membrane, external ear and ear canal normal. Tympanic membrane is not erythematous and not bulging. no impacted cerumen  Nose: Nose normal. No mucosal edema, rhinorrhea or nasal deformity. No epistaxis. Right sinus exhibits no maxillary sinus tenderness and no frontal sinus tenderness. Left sinus exhibits no maxillary sinus tenderness and no frontal sinus tenderness.   Mouth/Throat: Uvula is midline, oropharynx is clear and moist and mucous membranes are normal. Mucous membranes are moist. No trismus in the jaw. Normal dentition. No uvula swelling. No oropharyngeal exudate, posterior oropharyngeal edema, posterior oropharyngeal erythema or cobblestoning. No tonsillar exudate.   Eyes: Conjunctivae and lids are normal. Pupils are equal, round, and reactive to light. Right eye exhibits no discharge. Left eye exhibits no discharge. No scleral icterus.   Neck: Trachea normal and phonation normal. Neck supple. No edema present. No erythema present. No neck rigidity present.   Cardiovascular: Normal rate, regular rhythm, normal heart sounds and normal pulses.   Pulmonary/Chest: Effort normal and breath sounds normal. No accessory muscle usage or stridor. No tachypnea. No respiratory distress. She has no decreased breath sounds. She has no wheezes. She has no rhonchi. She has no rales.   Abdominal: Normal appearance.   Musculoskeletal: Normal range of  motion.         General: No deformity. Normal range of motion.   Neurological: She is alert and oriented to person, place, and time. She exhibits normal muscle tone. Coordination and gait normal.   Skin: Skin is warm, dry, intact, not diaphoretic, not pale and no rash.   Psychiatric: Her speech is normal and behavior is normal. Judgment and thought content normal.   Nursing note and vitals reviewed.      Assessment:     1. Persistent cough    2. History of COVID-19    3. History of pneumonia        Plan:       Persistent cough  -     predniSONE (DELTASONE) 10 MG tablet; Take 4 tabs on day 1, take 3 days on day 2, take 2 tabs on days 3-4, take 1 tab on days 5-7  Dispense: 14 tablet; Refill: 0    History of COVID-19  -     predniSONE (DELTASONE) 10 MG tablet; Take 4 tabs on day 1, take 3 days on day 2, take 2 tabs on days 3-4, take 1 tab on days 5-7  Dispense: 14 tablet; Refill: 0    History of pneumonia  -     predniSONE (DELTASONE) 10 MG tablet; Take 4 tabs on day 1, take 3 days on day 2, take 2 tabs on days 3-4, take 1 tab on days 5-7  Dispense: 14 tablet; Refill: 0        Patient Instructions   Discharge Instructions:     - Please take over the counter Coricidin HBP for cough and congestion to avoid increasing your blood pressure.    - You were given steroids today. Please take full course. If you have diabetes, this may cause brief increase in blood sugar. Please monitor blood sugar at home. If you have high blood pressure, please check your blood pressure frequently. You will need to take the Oral Steroid (Prednisone) as directed. This medication should be taken in the morning, as it can cause you to feel jittery.     - Follow up with your PCP or Pulmonologist as directed in the next 1-2 weeks if not improved or as needed.      - Acetaminophen (tylenol) or Ibuprofen (advil,motrin) as directed as needed for fever/pain. Avoid tylenol if you have a history of liver disease. Do not take ibuprofen if you have a  history of GI bleeding, kidney disease, or if you take blood thinners.     - If your condition worsens or fails to improve we recommend that you receive another evaluation at the ER immediately or contact your PCP to discuss your concerns or return here.     -If you smoke, please stop smoking.    - You must understand that you have received an Urgent Care treatment only and that you may be released before all of your medical problems are known or treated. You, the patient, will arrange for follow up care as instructed.          Additional MDM:     Heart Failure Score:   COPD = No

## 2025-01-06 NOTE — PATIENT INSTRUCTIONS
Discharge Instructions:     - Please take over the counter Coricidin HBP for cough and congestion to avoid increasing your blood pressure.    - You were given steroids today. Please take full course. If you have diabetes, this may cause brief increase in blood sugar. Please monitor blood sugar at home. If you have high blood pressure, please check your blood pressure frequently. You will need to take the Oral Steroid (Prednisone) as directed. This medication should be taken in the morning, as it can cause you to feel jittery.     - Follow up with your PCP or Pulmonologist as directed in the next 1-2 weeks if not improved or as needed.      - Acetaminophen (tylenol) or Ibuprofen (advil,motrin) as directed as needed for fever/pain. Avoid tylenol if you have a history of liver disease. Do not take ibuprofen if you have a history of GI bleeding, kidney disease, or if you take blood thinners.     - If your condition worsens or fails to improve we recommend that you receive another evaluation at the ER immediately or contact your PCP to discuss your concerns or return here.     -If you smoke, please stop smoking.    - You must understand that you have received an Urgent Care treatment only and that you may be released before all of your medical problems are known or treated. You, the patient, will arrange for follow up care as instructed.

## 2025-01-11 ENCOUNTER — OFFICE VISIT (OUTPATIENT)
Dept: URGENT CARE | Facility: CLINIC | Age: 80
End: 2025-01-11
Payer: MEDICARE

## 2025-01-11 VITALS
HEART RATE: 88 BPM | SYSTOLIC BLOOD PRESSURE: 144 MMHG | DIASTOLIC BLOOD PRESSURE: 76 MMHG | WEIGHT: 116.06 LBS | HEIGHT: 68 IN | OXYGEN SATURATION: 93 % | RESPIRATION RATE: 20 BRPM | TEMPERATURE: 98 F | BODY MASS INDEX: 17.59 KG/M2

## 2025-01-11 DIAGNOSIS — R05.3 PERSISTENT COUGH: Primary | ICD-10-CM

## 2025-01-11 DIAGNOSIS — R09.82 POST-NASAL DRIP: ICD-10-CM

## 2025-01-11 PROCEDURE — 99213 OFFICE O/P EST LOW 20 MIN: CPT | Mod: S$GLB,,,

## 2025-01-11 RX ORDER — DEXTROMETHORPHAN POLISTIREX 30 MG/5ML
60 SUSPENSION ORAL 2 TIMES DAILY
Qty: 148 ML | Refills: 0 | Status: SHIPPED | OUTPATIENT
Start: 2025-01-11 | End: 2025-01-21

## 2025-01-11 NOTE — PROGRESS NOTES
"Subjective:      Patient ID: Nehal Jimenez is a 79 y.o. female.    Vitals:  height is 5' 8" (1.727 m) and weight is 52.7 kg (116 lb 1.2 oz). Her tympanic temperature is 97.8 °F (36.6 °C). Her blood pressure is 144/76 (abnormal) and her pulse is 88. Her respiration is 20 and oxygen saturation is 93% (abnormal).     Chief Complaint: No chief complaint on file.    80 yo female Patient with PMHx of HTN, T2DM,  bronchiectasis, history of pneumonia, returns to clinic after being seen on 1/6/25.  She states she is still coughing .  She has one more day of prednisone pill to take.  Taking coricidin HBP which is not helping.  Productive cough with mucous and post nasal drip. States when she blew her nose she noticed specks of blood in it. Has pulmonologist appointment in March and sees ID in about 2 weeks. Denies cp, sob, chest tightness, nose bleeding, sore throat, voice change. Allergic to paxlovid, latex.    Cough  This is a recurrent problem. The current episode started 1 to 4 weeks ago. The problem has been unchanged. The problem occurs every few minutes. The cough is Productive of sputum. Pertinent negatives include no chest pain, chills, ear congestion, ear pain, eye redness, fever, headaches, heartburn, hemoptysis, myalgias, nasal congestion, postnasal drip, rash, rhinorrhea, sore throat, shortness of breath, sweats, weight loss or wheezing. Nothing aggravates the symptoms. She has tried nothing for the symptoms. The treatment provided no relief. There is no history of asthma, bronchiectasis, bronchitis, COPD, emphysema, environmental allergies or pneumonia.       Constitution: Negative for chills and fever.   HENT:  Negative for ear pain, postnasal drip, sore throat and voice change.    Neck: Negative for neck pain and neck stiffness.   Cardiovascular:  Negative for chest pain.   Eyes:  Negative for eye redness.   Respiratory:  Positive for cough and sputum production. Negative for chest tightness, bloody sputum, " COPD, shortness of breath and wheezing.    Gastrointestinal:  Negative for heartburn.   Musculoskeletal:  Negative for muscle ache.   Skin:  Negative for rash.   Allergic/Immunologic: Negative for environmental allergies and sneezing.   Neurological:  Negative for headaches.      Objective:     Vitals:    01/11/25 1036   BP: (!) 144/76   Pulse: 88   Resp: 20   Temp: 97.8 °F (36.6 °C)       Physical Exam   Constitutional: She is oriented to person, place, and time. She appears well-developed. She is cooperative.  Non-toxic appearance. She does not appear ill. No distress.   HENT:   Head: Normocephalic and atraumatic.   Ears:   Right Ear: Hearing, tympanic membrane, external ear and ear canal normal. Tympanic membrane is not erythematous and not bulging. no impacted cerumen  Left Ear: Hearing, tympanic membrane, external ear and ear canal normal. Tympanic membrane is not erythematous and not bulging. no impacted cerumen  Nose: Nose normal. No mucosal edema, rhinorrhea or nasal deformity. No epistaxis. Right sinus exhibits no maxillary sinus tenderness and no frontal sinus tenderness. Left sinus exhibits no maxillary sinus tenderness and no frontal sinus tenderness.   Mouth/Throat: Uvula is midline, oropharynx is clear and moist and mucous membranes are normal. Mucous membranes are moist. No trismus in the jaw. Normal dentition. No uvula swelling. Cobblestoning (mild) present. No oropharyngeal exudate, posterior oropharyngeal edema or posterior oropharyngeal erythema.   Eyes: Conjunctivae and lids are normal. Pupils are equal, round, and reactive to light. Right eye exhibits no discharge. Left eye exhibits no discharge. No scleral icterus.   Neck: Trachea normal and phonation normal. Neck supple. No edema present. No erythema present. No neck rigidity present.   Cardiovascular: Normal rate, regular rhythm, normal heart sounds and normal pulses.   Pulmonary/Chest: Effort normal and breath sounds normal. No accessory  muscle usage or stridor. No tachypnea. No respiratory distress. She has no decreased breath sounds. She has no wheezes. She has no rhonchi. She has no rales.   Abdominal: Normal appearance.   Musculoskeletal: Normal range of motion.         General: No deformity. Normal range of motion.   Neurological: She is alert and oriented to person, place, and time. She exhibits normal muscle tone. Coordination normal.   Skin: Skin is warm, dry, intact, not diaphoretic, not pale and no rash.   Psychiatric: Her speech is normal and behavior is normal. Judgment and thought content normal.   Nursing note and vitals reviewed.      Assessment:     1. Persistent cough    2. Post-nasal drip        Plan:       Persistent cough  -     dextromethorphan (DELSYM 12 HOUR) 30 mg/5 mL liquid; Take 10 mLs (60 mg total) by mouth 2 (two) times daily. for 10 days  Dispense: 148 mL; Refill: 0    Post-nasal drip        Patient Instructions   PLEASE READ YOUR DISCHARGE INSTRUCTIONS ENTIRELY AS IT CONTAINS IMPORTANT INFORMATION.    - Please take an over the counter antihistamine medication (Allegra/Claritin/Zyrtec/Xyzal) of your choice as directed. These are antihistamines that can help with runny nose, nasal congestion, sneezing, and helps to dry up post-nasal drip, which usually causes sore throat and cough.    If you do have Hypertension or palpitations, it is safe to take Coricidin HBP for relief of sinus symptoms.    -IF YOU RECEIVED PRESCRIPTION COUGH SUPPRESSANTS: Take prescription cough meds (pills) as prescribed; take prescription cough syrup at night as needed for cough.  Do not take both the prescribed cough pills and syrup at the same time or within 6 hours of each other.  Do not take the cough syrup with any other sedative medication as it can can cause drowsiness. Do not operate any heavy machinery, drink or drive while taking the cough syrup.    Try taking half a dose first of the cough syrup to see how it affects you.     Use over  the counter flonase: one spray each nostril twice daily OR two sprays each nostril once daily for sinus congestion and postnasal drip. This is a steroid nasal spray that works locally over time to decrease the inflammation in your nose/sinuses and help with allergic symptoms. This is not an quick- relief spray like afrin, but it works well if used daily.  Discontinue if you develop nose bleed    Sinus rinses DO NOT USE TAP WATER, if you must, water must be a rolling boil for 1 minute, let it cool, then use.  May use distilled water, or over the counter nasal saline rinses.  Vics vapor rub in shower to help open nasal passages.  May use nasal gel to keep passages moisturized.  May use Nasal saline sprays during the day for added relief of congestion.   For those who go to the gym, please do not use the sauna or steam room now to clear sinuses.    If you  smoke, please stop smoking.      Please return or see your primary care doctor if you develop new or worsening symptoms.     Please arrange follow up with your primary medical clinic as soon as possible. You must understand that you've received an Urgent Care treatment only and that you may be released before all of your medical problems are known or treated. You, the patient, will arrange for follow up as instructed. If your symptoms worsen or fail to improve you should go to the Emergency Room.         Additional MDM:     Heart Failure Score:   COPD = No

## 2025-01-11 NOTE — PATIENT INSTRUCTIONS
PLEASE READ YOUR DISCHARGE INSTRUCTIONS ENTIRELY AS IT CONTAINS IMPORTANT INFORMATION.    - Please take an over the counter antihistamine medication (Allegra/Claritin/Zyrtec/Xyzal) of your choice as directed. These are antihistamines that can help with runny nose, nasal congestion, sneezing, and helps to dry up post-nasal drip, which usually causes sore throat and cough.    If you do have Hypertension or palpitations, it is safe to take Coricidin HBP for relief of sinus symptoms.    -IF YOU RECEIVED PRESCRIPTION COUGH SUPPRESSANTS: Take prescription cough meds (pills) as prescribed; take prescription cough syrup at night as needed for cough.  Do not take both the prescribed cough pills and syrup at the same time or within 6 hours of each other.  Do not take the cough syrup with any other sedative medication as it can can cause drowsiness. Do not operate any heavy machinery, drink or drive while taking the cough syrup.    Try taking half a dose first of the cough syrup to see how it affects you.     Use over the counter flonase: one spray each nostril twice daily OR two sprays each nostril once daily for sinus congestion and postnasal drip. This is a steroid nasal spray that works locally over time to decrease the inflammation in your nose/sinuses and help with allergic symptoms. This is not an quick- relief spray like afrin, but it works well if used daily.  Discontinue if you develop nose bleed    Sinus rinses DO NOT USE TAP WATER, if you must, water must be a rolling boil for 1 minute, let it cool, then use.  May use distilled water, or over the counter nasal saline rinses.  Vics vapor rub in shower to help open nasal passages.  May use nasal gel to keep passages moisturized.  May use Nasal saline sprays during the day for added relief of congestion.   For those who go to the gym, please do not use the sauna or steam room now to clear sinuses.    If you  smoke, please stop smoking.      Please return or see your  primary care doctor if you develop new or worsening symptoms.     Please arrange follow up with your primary medical clinic as soon as possible. You must understand that you've received an Urgent Care treatment only and that you may be released before all of your medical problems are known or treated. You, the patient, will arrange for follow up as instructed. If your symptoms worsen or fail to improve you should go to the Emergency Room.

## 2025-01-28 ENCOUNTER — OFFICE VISIT (OUTPATIENT)
Dept: INFECTIOUS DISEASES | Facility: CLINIC | Age: 80
End: 2025-01-28
Payer: MEDICARE

## 2025-01-28 ENCOUNTER — TELEPHONE (OUTPATIENT)
Dept: PULMONOLOGY | Facility: CLINIC | Age: 80
End: 2025-01-28
Payer: MEDICARE

## 2025-01-28 VITALS
HEIGHT: 68 IN | DIASTOLIC BLOOD PRESSURE: 75 MMHG | SYSTOLIC BLOOD PRESSURE: 125 MMHG | BODY MASS INDEX: 17.11 KG/M2 | WEIGHT: 112.88 LBS | HEART RATE: 91 BPM

## 2025-01-28 DIAGNOSIS — J47.9 ADULT BRONCHIECTASIS: Primary | ICD-10-CM

## 2025-01-28 DIAGNOSIS — J47.9 ADULT BRONCHIECTASIS: ICD-10-CM

## 2025-01-28 DIAGNOSIS — A31.0 MAI (MYCOBACTERIUM AVIUM-INTRACELLULARE): ICD-10-CM

## 2025-01-28 DIAGNOSIS — A31.0 MAI (MYCOBACTERIUM AVIUM-INTRACELLULARE): Primary | ICD-10-CM

## 2025-01-28 DIAGNOSIS — I10 PRIMARY HYPERTENSION: ICD-10-CM

## 2025-01-28 DIAGNOSIS — J98.19 RIGHT MIDDLE LOBE SYNDROME: ICD-10-CM

## 2025-01-28 DIAGNOSIS — E11.9 DIABETES MELLITUS TYPE 2, NONINSULIN DEPENDENT: ICD-10-CM

## 2025-01-28 DIAGNOSIS — E78.49 OTHER HYPERLIPIDEMIA: ICD-10-CM

## 2025-01-28 DIAGNOSIS — E03.9 HYPOTHYROIDISM, UNSPECIFIED TYPE: ICD-10-CM

## 2025-01-28 DIAGNOSIS — J98.19 RIGHT MIDDLE LOBE SYNDROME: Primary | ICD-10-CM

## 2025-01-28 PROCEDURE — 99999 PR PBB SHADOW E&M-EST. PATIENT-LVL V: CPT | Mod: PBBFAC,,, | Performed by: STUDENT IN AN ORGANIZED HEALTH CARE EDUCATION/TRAINING PROGRAM

## 2025-01-28 PROCEDURE — 99215 OFFICE O/P EST HI 40 MIN: CPT | Mod: PBBFAC | Performed by: STUDENT IN AN ORGANIZED HEALTH CARE EDUCATION/TRAINING PROGRAM

## 2025-01-28 PROCEDURE — 99205 OFFICE O/P NEW HI 60 MIN: CPT | Mod: S$PBB,,, | Performed by: STUDENT IN AN ORGANIZED HEALTH CARE EDUCATION/TRAINING PROGRAM

## 2025-01-28 RX ORDER — ETHAMBUTOL HYDROCHLORIDE 400 MG/1
25 TABLET, FILM COATED ORAL
Qty: 108 TABLET | Refills: 3 | Status: CANCELLED | OUTPATIENT
Start: 2025-01-29 | End: 2026-01-29

## 2025-01-28 RX ORDER — AZITHROMYCIN 500 MG/1
500 TABLET, FILM COATED ORAL
Qty: 36 TABLET | Refills: 3 | Status: CANCELLED | OUTPATIENT
Start: 2025-01-29 | End: 2026-01-29

## 2025-01-28 RX ORDER — SODIUM CHLORIDE FOR INHALATION 7 %
4 VIAL, NEBULIZER (ML) INHALATION EVERY 12 HOURS
Qty: 240 ML | Refills: 11 | Status: SHIPPED | OUTPATIENT
Start: 2025-01-28 | End: 2026-01-28

## 2025-01-28 RX ORDER — RIFAMPIN 300 MG/1
600 CAPSULE ORAL
Qty: 72 CAPSULE | Refills: 3 | Status: CANCELLED | OUTPATIENT
Start: 2025-01-29 | End: 2026-01-29

## 2025-01-28 NOTE — TELEPHONE ENCOUNTER
----- Message from Shreyas Jewell DO sent at 1/28/2025 11:00 AM CST -----  Regarding: Bronchial hygiene  Hejed Alaniz. So this patient is having trouble producing sputum at this time.  says taking cough syrup worsens cough but it is unpredictable. Sometimes spits up mucus but only after exercising. Symptoms not classic for MAC. Has mild disease on CT. I offered thrice weekly 3 drug regimen but patient wants to speak with eye doctor first since she had corneal transplants.     Can your office set her up with nebulizer for 7% saline and also Aerobika device? Want to try helping her cough up sputum if she pursues treatment. Thanks.     Anand

## 2025-01-28 NOTE — PROGRESS NOTES
"Infectious Disease Clinic Note    Patient Name: Nehal Jimenez  YOB: 1945    PRESENTING HISTORY       History of Present Illness:  Ms. Nehal Jimenez is a 79 y.o. female w/ significant PMHx of DM, HTN, HLD, and hypothyroidism referred by pulmonology due to positive cultures collected from right lung on 11/6/24. Underwent bronchoscopy at the time. Findings: "Scanty secretions. Lavage performed collectively sent for microbiology. 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 CT chest with tree in bud nodular disease at the time. Had been seen for COVID last year and admitted. Seen by pulmonology for follow up and decision made to perform bronch. Current symptoms: daily cough; post nasal drip after exercise. Used to cough up a lot of mucus. Discussed with patient that Acremonium is a mold that can contaminate surfaces. More concerning if she were immunocompromised. Not likely to cause infection in this case. More commonly infects skin and nails. CT findings more likely associated with MAC. Explained complexity of MAC treatment. Ethambutol has been known to cause irreversible blindness in some cases so patient needs to see eye specialist weekly to monthly. Rifampin/rifabutin with potential to raise LFT and cause GERD/GI symptoms. Azithromycin well tolerated. Due to mild disease would plan to use 3x weekly dosing for 12 months from negative sputum cultures. 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 Sees Dr Sofia with [facility]. Patient would like to discuss risks of treatment with him before deciding to pursue treatment. Also not able to produce sputum at this time. Will need to ask pulmonology for clearance regimen. Patient and  most concerned about weight loss.       EXAMINATION:  CT CHEST WITHOUT CONTRAST     CLINICAL HISTORY:  Bronchiectasis, uncomplicatedRML atelectasis;   "   TECHNIQUE:  Axial CT images performed through the chest without intravenous contrast.     COMPARISON:  08/04/2024     FINDINGS:  Progression of the tree-in-bud micronodular opacities in the upper lobes.  The micro nodular opacities with bronchiectasis and right middle lobe atelectasis is otherwise stable in the lower lobes, lingula, and right middle lobe.  No effusions.  No thoracic adenopathy.     The upper abdominal visualized organs are within normal limits.     The bones are intact.     Impression:     Progression of the tree-in-bud micronodular opacities specifically in the upper lobes characteristic of an indolent infectious bronchiolitis such as DORI.     All CT scans at this facility are performed  using dose modulation techniques as appropriate to performed exam including the following:  automated exposure control; adjustment of mA and/or kV according to the patients size (this includes techniques or standardized protocols for targeted exams where dose is matched to indication/reason for exam: i.e. extremities or head);  iterative reconstruction technique.        Electronically signed by:Amadeo Avila MD  Date:                                            10/28/2024  Time:                                           10:53        Exam Ended: 10/28/24 10:27 CDT Last Resulted: 10/28/24 10:53 CDT          Procedure Component Value Units Date/Time   Culture, Respiratory with Gram Stain [[id number]] Collected: 11/06/24 0828   Order Status: Canceled Specimen: Respiratory from Lung, RML    AFB Culture & Smear [[id number]] (Abnormal) Collected: 11/06/24 0828   Order Status: Completed Specimen: Respiratory from Lung, RML Updated: 12/11/24 1245    AFB Culture & Smear Culture positive, identification pending     Results acknowledged via secure chat by Dr. Brian Aldrich     11/20/2024  11:41     MYCOBACTERIUM SPECIES  Identification and susceptibility pending   Abnormal     AFB CULTURE STAIN No acid fast bacilli seen.    Fungus culture [[id number]] (Abnormal) Collected: 11/06/24 0828   Order Status: Completed Specimen: Respiratory from Lung, RML Updated: 12/16/24 0529    Fungus (Mycology) Culture ACREMONIUM SPECIES  Rare   Abnormal    KOH prep [[id number]] Collected: 11/06/24 0828   Order Status: Completed Specimen: Respiratory from Lung, RML Updated: 11/07/24 0133    KOH Prep No yeast or fungal elements seen   Culture Referred ID, Mycobacteria and Nocardia [[id number]] (Abnormal) Collected: 11/06/24 0828   Order Status: Completed Updated: 12/30/24 1410    Specimen and source - Mycobacterium RESP, Lung, RML    Mycobacteria culture ID + Susc, referred FINAL 12/30/2024 1408 Abnormal     Comment: SOURCE: LUNG, lung RML  MYCOBACTERIA CULTURE REFER ID+SUSC                     FINAL  MYCOBACTERIUM AVIUM COMPLEX    -  Per CLSI M24 Guidelines, LIZY data for ethambutol,  rifampin,and rifabutin have shown poor correlation  with clinical response. Therefore, although these  drugs are in the recommended treatment regimen,  breakpoints for these agents that separate  susceptible from resistant strains cannot be  determined and these drugs will not be reported.  A high or a low LIZY value provided by a laboratory  should not be used to determine whether these  drugs are useful in treatment regimens and we will  not test forward to other institutions for testing  of these agents because it is contrary to current  guidance-based therapy recommendations.  There are no established interpretive guidelines for agents  reported without interpretations.  Previous comment was modified at 15:03 on 12/16/2024:  Negative for M. tuberculosis complex to date  by PCR.  ----------------------------------------------------------  Organism     MYCOBACTERIUM AVIUM COMPLEX  Antibiotic                   LIZY (mcg/mL)  Interpretation  ----------------------------------------------------------  Clofazimine                           0.5  Moxifloxacin                            >4       R  Clarithromycin                          2       S  Amikacin (IV)                          64       R  Amikacin (liposomal, inhaled)          64       S  Linezolid                              16       I  Moxifloxacin: The in vivo effectiveness of this agent for  MAC disease is unproven.  Clarithromycin: Clarithromycin is the class drug for  macrolides and the only macrolide that needs to be tested.  Linezolid: The in vivo effectiveness of this agent for MAC  disease is unproven.  ------------------------------------------------------------  S=SUSCEPTIBLE  I=INTERMEDIATE  R=RESISTANT  NS=NONSUSCEPTIBLE  SDD=SUSCEPTIBLE DOSE DEPENDENT  ------------------------------------------------------------    Test Performed by:  [place] [address]  : Uziel Vick Ph.D.; IA# [id number]      Culture, Respiratory with Gram Stain [[id number]] Collected: 11/06/24 0826   Order Status: Completed Specimen: Bronchial Wash Updated: 11/09/24 1123    Respiratory Culture No growth    Gram Stain (Respiratory) <10 epithelial cells per low power field.    Gram Stain (Respiratory) Rare WBC's    Gram Stain (Respiratory) No organisms seen   AFB Culture & Smear [[id number]] Collected: 11/06/24 0826   Order Status: Completed Specimen: Bronchial Wash Updated: 12/04/24 1218    AFB Culture & Smear Culture positive, identification pending     Verbal report given for previous positive culture    AFB CULTURE STAIN No acid fast bacilli seen.   Fungus culture [[id number]] Collected: 11/06/24 0826   Order Status: Completed Specimen: Bronchial Wash Updated: 12/09/24 1001    Fungus (Mycology) Culture No fungus isolated after 4 weeks   KOH prep [[id number]] Collected: 11/06/24 0826   Order Status: Completed Specimen: Bronchial Wash Updated: 11/07/24 0148    KOH Prep No yeast or fungal elements seen   Culture Referred for Identification, Mycobacterium  "[[id number]] (Abnormal) Collected: 11/06/24 0826   Order Status: Completed Updated: 12/23/24 1417    Culture referred for ID, Mycobacterium FINAL 12/23/2024 1415 Abnormal     Comment: SOURCE: BRONCHIAL WASHING, Bronchial Wash  CULTURE REFER FOR ID, MYCOBACTERIUM                    FINAL  MYCOBACTERIUM AVIUM COMPLEX    -  Previous comment was modified at 14:15 on 12/23/2024:  Negative for M. tuberculosis complex to date  by PCR.    Test Performed by:  [facility] [place] [address]  : Uziel Vick Ph.D.; CLIA# [id number]            Review of Systems:  Constitutional: no fever or chills; persistent weight loss; on diabetic diet   Eyes: no visual changes; has had corneal transplant in each eye  ENT: no nasal congestion or sore throat  Respiratory: unpredictable cough; not really productive; post nasal drip and sometimes "spits up" mucus   Cardiovascular: no chest pain  Gastrointestinal: no nausea or vomiting, no abdominal pain, no constipation, no diarrhea  Genitourinary: no hematuria or dysuria  Musculoskeletal: no arthralgias or myalgias  Skin: no rash  Neurological: no headaches, numbness, or paresthesias    The following portions of the patient's history were reviewed and updated as appropriate: allergies, current medications, past family history, past medical history, past social history, past surgical history, and problem list.    PAST HISTORY:     Immunization History   Administered Date(s) Administered    COVID-19, MRNA, LN-S, PF (Pfizer) (Gray Cap) 07/06/2022    COVID-19, MRNA, LN-S, PF (Pfizer) (Purple Cap) 01/13/2021, 02/03/2021, 10/04/2021    COVID-19, mRNA, LNP-S, PF, daniella-sucrose, 30 mcg/0.3 mL (Pfizer Ages 12+) 09/28/2024       Past Medical History:   Diagnosis Date    Diabetes mellitus     Hypercholesteremia     Hypertension     Hypothyroidism     Osteopenia     Endocrinologist treating    Pneumonia        Past Surgical " History:   Procedure Laterality Date    BREAST BIOPSY      BREAST CYST ASPIRATION      BRONCHOSCOPY Bilateral 11/6/2024    Procedure: Bronchoscopy - insert lighted tube into airway to take a biopsy of lung;  Surgeon: Brian Aldrich MD;  Location: [facility];  Service: Pulmonary;  Laterality: Bilateral;    COLONOSCOPY  2016    [facility]    ENDOSCOPY         Family History   Problem Relation Name Age of Onset    Colon cancer Father  86    Hodgkin's lymphoma Father         Social History     Socioeconomic History    Marital status:    Tobacco Use    Smoking status: Never    Smokeless tobacco: Never   Substance and Sexual Activity    Alcohol use: Never    Drug use: Never    Sexual activity: Not Currently     Partners: Male     Birth control/protection: None     Social Drivers of Health     Financial Resource Strain: Low Risk  (8/25/2024)    Overall Financial Resource Strain (CARDIA)     Difficulty of Paying Living Expenses: Not hard at all   Food Insecurity: No Food Insecurity (8/25/2024)    Hunger Vital Sign     Worried About Running Out of Food in the Last Year: Never true     Ran Out of Food in the Last Year: Never true   Transportation Needs: No Transportation Needs (8/5/2024)    TRANSPORTATION NEEDS     Transportation : No   Physical Activity: Sufficiently Active (8/25/2024)    Exercise Vital Sign     Days of Exercise per Week: 3 days     Minutes of Exercise per Session: 60 min   Stress: No Stress Concern Present (8/25/2024)    [employer] - Occupational Stress Questionnaire     Feeling of Stress : Only a little   Housing Stability: Unknown (8/25/2024)    Housing Stability Vital Sign     Unable to Pay for Housing in the Last Year: No       MEDICATIONS & ALLERGIES:     Current Outpatient Medications on File Prior to Visit   Medication Sig    calcium citrate-vitamin D3 315-200 mg (CITRACAL+D) 315 mg-5 mcg (200 unit) per tablet Take 1 tablet by mouth 2 (two) times daily.     "denosumab (PROLIA) 60 mg/mL Syrg Inject 60 mg into the skin. Patient takes every 6 months. Next due on 11/14/2024.    lancets 33 gauge Misc USE EVERY DAY    levothyroxine (SYNTHROID) 125 MCG tablet Take by mouth.    losartan (COZAAR) 25 MG tablet losartan 25 mg tablet   TAKE 1 TABLET BY MOUTH DAILY    metFORMIN (GLUCOPHAGE-XR) 500 MG ER 24hr tablet Take 500 mg by mouth once daily.    ONETOUCH VERIO TEST STRIPS Strp SMARTSIG:Via Meter Daily    pravastatin (PRAVACHOL) 40 MG tablet Take 1 tablet by mouth once daily.    prednisoLONE acetate (PRED FORTE) 1 % DrpS Place 1 drop into both eyes.    predniSONE (DELTASONE) 10 MG tablet Take 4 tabs on day 1, take 3 days on day 2, take 2 tabs on days 3-4, take 1 tab on days 5-7    promethazine-dextromethorphan (PROMETHAZINE-DM) 6.25-15 mg/5 mL Syrp Take 5 mLs by mouth 4 (four) times daily as needed.    pulse oximeter (PULSE OXIMETER) device by Apply Externally route 2 (two) times a day. Use twice daily at 8 AM and 3 PM and [facility] as directed.    solifenacin (VESICARE) 10 MG tablet Take 1 tablet by mouth every morning.    triamcinolone acetonide 0.1% (KENALOG) 0.1 % cream Apply to the affected area twice daily.  Do not apply to face, underarms, or groin.     No current facility-administered medications on file prior to visit.       Review of patient's allergies indicates:   Allergen Reactions    Paxlovid [nirmatrelvir-ritonavir] Swelling    Adhesive tape-silicones     Latex, natural rubber     Albuterol Anxiety, Other (See Comments) and Palpitations       OBJECTIVE:   Vital Signs:  Vitals:    01/28/25 0957   BP: 125/75   Pulse: 91   SpO2: (!) (P) 91%   Weight: 51.2 kg (112 lb 14 oz)   Height: 5' 8" (1.727 m)       No results found for this or any previous visit (from the past 24 hours).      Physical Exam:   General:  Well developed, well nourished, no acute distress  HEENT:  Normocephalic, atraumatic, EOMI, clear sclera, throat clear without erythema or " exudates  CVS:  RRR, S1 and S2 normal, no murmurs, rubs, gallops  Resp:  Lungs clear to auscultation, no wheezes, rales, rhonchi  GI:  Abdomen soft, non-tender, non-distended, normoactive bowel sounds, no masses  MSK:  No muscle atrophy, peripheral edema, full range of motion  Skin:  No rashes, ulcers, erythema  Neuro:  CNII-XII grossly intact  Psych:  Alert and oriented to person, place, and time    ASSESSMENT:     Pulmonary MAC  Using as guide 2020-Treatment of Nontuberculous Mycobacterial Pulmonary Disease: An Official ATS/ERS/ESCMID/IDSA Clinical Practice Guideline: Executive Summary -  Therapy needs combination of clinical, microbiologic and radiologic therapy .  Clinical and radiologic criteria (all required) 1. Pulmonary or systemic symptoms and 2. Nodular or cavitary opacities on chest radiograph or bronchiectasis with multiple small nodules on high-resolution computed tomography and 3. Appropriate exclusion of other diagnoses       Clinical and microbiological criteria for diagnosing non-tuberculous mycobacterial lung disease (wendy et al )    Clinical ( Both required)   1.  Pulmonary symptoms, nodular or cavitary opacities on chest radiograph, or a high-resolution CT scan that shows multifocal bronchiectasis with multiple small nodules.    2.  Appropriate exclusion of other diagnoses.   Microbiological ( One required)   1. Positive culture results from at least two separate expectorated sputum samples; if the results are nondiagnostic,consider repeat sputum AFB smears and cultures.   2. Positive culture results from at least one bronchial wash or Lavage   3. Transbronchial or other lung biopsy with mycobacterial histopathological features (granulomatous inflammation or AFB) and positive culture for NTM or biopsy showing mycobacterial histopathological features (granulomatous inflammation or AFB) and one or more sputum or bronchial washings that are culture-positive for NTM.     --AFB + 11/6 from BAL    --Nodular disease noted on recent CT imaging   --Would consider this mild disease   --At this time patient not reporting classic symptoms; difficult to cough up sputum for surveillance which would make treatment challenging   --Otherwise meets criteria for thrice weekly treatment   --Proposed treatment regimen: PO azithromycin 500 mg 3x weekly, rifampin 600 mg 3x weekly, and ethambutol 25 mg/kg (1200 mg) 3x weekly   --Discussed possible side effects including vision changes/blindness from ethambutol, elevated LFT from rifampin, and prolonged QT from azithromycin (less likely as patient not on other QT prolonging agents)   --Monthly CBC and CMP while on treatment   --Patient will need monthly vision testing while on ethambutol  --Patient has opted to discuss risks of ethambutol with eye doctor prior to pursuing treatment   --Plan to collect AFB sputum culture 4 weeks after beginning treatment and continue monitoring every 4 weeks thereafter   --Clinical expectations: symptomatic and radiographic improvement within 2-6 months and conversion of sputum culture from positive to negative within approximately 6 months   --Will plan to treat for at least 12 months after sputum culture becomes negative for MAC   --Recommend adjunctive bronchial hygiene with 7% nebulizer and Aerobika via pulmonology especially since patient struggling to produce sputum   --Will check CT chest in 6 months   --Follow up with me in 3 weeks     DM  Continue current medications and follow up with PCP      HTN  Continue current medications and follow up with PCP      HLD  Continue current medications and follow up with PCP    Hypothyroidism   Continue current medications and follow up with PCP    PLAN:     Nehal was seen today for mac-prod. cough.    Diagnoses and all orders for this visit:    DORI (mycobacterium avium-intracellulare)  -     Ambulatory referral/consult to Infectious Disease  -     CT Chest Without Contrast; Future    Right middle  lobe syndrome  -     Ambulatory referral/consult to Infectious Disease    Adult bronchiectasis  -     Ambulatory referral/consult to Infectious Disease    Diabetes mellitus type 2, noninsulin dependent    Primary hypertension    Other hyperlipidemia    Hypothyroidism, unspecified type    Other orders  The following orders have not been finalized:  -     Cancel: rifAMpin (RIFADIN) 300 MG capsule  -     Cancel: ethambutoL (MYAMBUTOL) 400 MG Tab  -     Cancel: azithromycin (ZITHROMAX) 500 MG tablet          The total time for evaluation and management services performed on 1/28/25 was greater than 60 minutes.        Anand Jewell, DO   Infectious Diseases

## 2025-01-28 NOTE — TELEPHONE ENCOUNTER
Patient: Jermaine Irving Date: 2021     : 1960 Attending: Sridhar Schulz MD   60 year old female Date of Admission: 2021  Length of Stay: 0       PULMONARY PROGRESS NOTE      Reason for follow-up:  Persistent cough    Subjective:    Patient reports improvement in cough.  She also slept better last night, with improvement in restless legs symptoms.    Past Medical History  Reviewed    Medications/Infusions:  Scheduled:   • fluticasone  2 spray Each Nare QHS   • doxycycline hyclate  100 mg Oral 2 times per day   • rOPINIRole  0.5 mg Oral Nightly   • ipratropium  0.5 mg Nebulization 4x Daily Resp   • methylPREDNISolone (SOLU-Medrol) IV  40 mg Intravenous 3 times per day   • doxercalciferol  15 mcg Intravenous Once per day on  Sat   • epoetin yohana-epbx  10,000 Units Subcutaneous Once per day on  Sat   • midodrine  10 mg Oral Once per day on  Sat   • fluticasone-umeclidin-vilanterol  1 puff Inhalation Daily Resp   • sodium chloride (PF)  2 mL Intracatheter 2 times per day   • letrozole  2.5 mg Oral QHS   • aspirin  81 mg Oral QHS   • linaclotide  290 mcg Oral QHS   • montelukast  10 mg Oral Daily   • metoPROLOL succinate  50 mg Oral Nightly   • sevelamer carbonate  2,400 mg Oral TID WC   • pantoprazole  40 mg Oral QAM AC   • apixaBAN  2.5 mg Oral 2 times per day     Vital Last Value 24 Hour Range   Temperature 98.6 °F (37 °C) Temp  Min: 98.6 °F (37 °C)  Max: 98.9 °F (37.2 °C)   Pulse 98 Pulse  Min: 98  Max: 106   Respiratory 18 Resp  Min: 18  Max: 18   Blood Pressure 131/67 BP  Min: 103/64  Max: 131/67   Art BP   No data recorded   Pulse Oximetry 98 % SpO2  Min: 94 %  Max: 100 %     Vital Today Admitted   Weight 124.8 kg Weight: 126.5 kg   Height N/A Height: 5' 5\" (165.1 cm)   BMI N/A BMI (Calculated): 46.41     Weight over the past 48 Hours:  Patient Vitals for the past 48 hrs:   Weight   21 0514 125.1 kg   21 1110 124.8 kg       Intake/Output Summary (Last 24 hours)  Orders Placed This Encounter   Procedures    NEBULIZER FOR HOME USE     Order Specific Question:   Height:     Answer:   5 8     Order Specific Question:   Weight:     Answer:   51     Order Specific Question:   Length of need (1-99 months):     Answer:   99        Requested Prescriptions     Signed Prescriptions Disp Refills    sodium chloride 7% 7 % nebulizer solution 240 mL 11     Sig: Take 4 mLs by nebulization every 12 (twelve) hours.     Authorizing Provider: TEO DE GUZMAN    mucus clearing device (ACAPELLA, FLUTTER) 1 each 0     Sig: by Misc.(Non-Drug; Combo Route) route every 12 (twelve) hours.     Authorizing Provider: TEO DE GUZMAN    mucus clearing device (AEROBIKA OSCILLATING PEP SYSTM) 1 each 0     Sig: by Misc.(Non-Drug; Combo Route) route every 12 (twelve) hours.     Authorizing Provider: TEO DE GUZMAN       at 5/23/2021 1214  Last data filed at 5/23/2021 0900  Gross per 24 hour   Intake 670 ml   Output --   Net 670 ml       Physical Exam:  Gen:  Up in chair.  No distress.  HEENT:  Pupils are equal and reactive.  No icterus.  Neck:  Supple.  No JVD, no adenopathy  Chest:  Diminished breath sounds over both lungs.  No wheezing or rhonchi.  CVS:  Regular rhythm.  No gallops murmurs or rubs.  Ext: no cyanosis, clubbing, edema.  Right upper extremity AV fistula.  Skin: no rash  Neuro: Oriented x 3; non-focal    Recent Labs   Lab 05/22/21  0612 05/21/21  0858 05/20/21  1604 05/20/21  1600   GLUCOSE 115* 154*  --  110*   SODIUM 135 135  --  137   POTASSIUM 3.9 3.6  --  3.2*   CHLORIDE 99 102  --  104   CO2 28 20*  --  25   BUN 23* 32*  --  25*   CREATININE 8.24* 10.20* 8.60* 7.94*   ANIONGAP 12 17  --  11   BILIRUBIN  --   --   --  0.2   AST  --   --   --  9   GPT  --   --   --  15   ALKPT  --   --   --  74   ALBUMIN  --   --   --  3.3*   MG  --   --   --  2.3   CALCIUM 8.5 8.3*  --  8.3*   PHOS 4.4  --   --   --    ALBERTO  --   --   --  1.17     Recent Labs   Lab 05/20/21  1600 05/05/21  0815 04/20/21  1149 04/19/21  0545 04/17/21  0106 01/05/21  0535   WBC 9.2 11.0 12.1* 13.9* 11.1* 7.3   RBC 3.03* 3.19* 3.50* 3.32* 3.44* 3.59*   HGB 8.9* 9.4* 10.3* 9.8* 10.2* 10.3*   HCT 28.7* 30.6* 31.6* 31.2* 32.0* 33.0*   MCV 94.7 95.9 90.3 94.0 93.0 91.9    210 289 287 262 226   Absolute Neutrophils 6.4 10.3*  --  12.3* 6.9 3.6   Absolute Lymphocytes 1.7 0.5*  --  0.9* 2.8 2.6   Absolute Monocytes 1.0* 0.1*  --  0.6 1.1* 0.9   Absolute Eosinophils  0.1 0.0  --  0.0 0.2 0.2   Absolute Basophils 0.0 0.0  --  0.0 0.1 0.0     Impression:    Severe persistent cough, likely multifactorial in etiology.  Patient reports improvement in cough since yesterday.    ESRD  Anemia  History of asthma  Obstructive sleep apnea.  Not able to adhere to CPAP use.  Restless legs syndrome.  Patient reports improvement on low-dose ropinirole.  History of  deep venous thrombosis and pulmonary embolism  Pulmonary hypertension  History of breast cancer  Spinal stenosis  Diabetes type 2, hypertension, dyslipidemia, gastroesophageal reflux disease    Recommendations:    Switch to prednisone tomorrow  Complete 7-10 days of oral doxycycline  Continue fluticasone nasal spray  Continue ropinirole.  Patient was informed that optimal treatment of restless legs syndrome may allow her to use CPAP more consistently.  The dose of ropinirole may need to be increased in future.  Adverse effects of medication were explained, including impulse control problems.      Rayne Mock M.D.  Pulmonary, Critical Care, and Sleep Medicine

## 2025-01-28 NOTE — PATIENT INSTRUCTIONS
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

## 2025-02-06 ENCOUNTER — CLINICAL SUPPORT (OUTPATIENT)
Dept: PULMONOLOGY | Facility: CLINIC | Age: 80
End: 2025-02-06
Payer: MEDICARE

## 2025-02-06 ENCOUNTER — TELEPHONE (OUTPATIENT)
Dept: PULMONOLOGY | Facility: CLINIC | Age: 80
End: 2025-02-06
Payer: MEDICARE

## 2025-02-06 DIAGNOSIS — J47.9 ADULT BRONCHIECTASIS: ICD-10-CM

## 2025-02-06 PROCEDURE — 99212 OFFICE O/P EST SF 10 MIN: CPT | Mod: PBBFAC

## 2025-02-06 PROCEDURE — 99999 PR PBB SHADOW E&M-EST. PATIENT-LVL II: CPT | Mod: PBBFAC,,,

## 2025-02-06 NOTE — PROGRESS NOTES
Pulmonary Disease Management  [facility]  CHRONIC CARE MANAGEMENT  Initial Visit       Diagnosis: BRONCHIECTASIS  Last Hospital Admission: NA  Last provider visit: 12/20/24      Current Outpatient Medications:     calcium citrate-vitamin D3 315-200 mg (CITRACAL+D) 315 mg-5 mcg (200 unit) per tablet, Take 1 tablet by mouth 2 (two) times daily., Disp: , Rfl:     denosumab (PROLIA) 60 mg/mL Syrg, Inject 60 mg into the skin. Patient takes every 6 months. Next due on 11/14/2024., Disp: , Rfl:     lancets 33 gauge Misc, USE EVERY DAY, Disp: , Rfl:     levothyroxine (SYNTHROID) 125 MCG tablet, Take by mouth., Disp: , Rfl:     losartan (COZAAR) 25 MG tablet, losartan 25 mg tablet  TAKE 1 TABLET BY MOUTH DAILY, Disp: , Rfl:     metFORMIN (GLUCOPHAGE-XR) 500 MG ER 24hr tablet, Take 500 mg by mouth once daily., Disp: , Rfl:     mucus clearing device (ACAPELLA, FLUTTER), by Misc.(Non-Drug; Combo Route) route every 12 (twelve) hours., Disp: 1 each, Rfl: 0    mucus clearing device (AEROBIKA OSCILLATING PEP SYSTM), by Misc.(Non-Drug; Combo Route) route every 12 (twelve) hours., Disp: 1 each, Rfl: 0    ONETOUCH VERIO TEST STRIPS Strp, SMARTSIG:Via Meter Daily, Disp: , Rfl:     pravastatin (PRAVACHOL) 40 MG tablet, Take 1 tablet by mouth once daily., Disp: , Rfl:     prednisoLONE acetate (PRED FORTE) 1 % DrpS, Place 1 drop into both eyes., Disp: , Rfl:     predniSONE (DELTASONE) 10 MG tablet, Take 4 tabs on day 1, take 3 days on day 2, take 2 tabs on days 3-4, take 1 tab on days 5-7, Disp: 14 tablet, Rfl: 0    promethazine-dextromethorphan (PROMETHAZINE-DM) 6.25-15 mg/5 mL Syrp, Take 5 mLs by mouth 4 (four) times daily as needed., Disp: , Rfl:     pulse oximeter (PULSE OXIMETER) device, by Apply Externally route 2 (two) times a day. Use twice daily at 8 AM and 3 PM and record the value in MyChart as directed., Disp: 1 each, Rfl: 0    sodium chloride 7% 7 % nebulizer solution, Take 4 mLs by nebulization every 12 (twelve)  hours., Disp: 240 mL, Rfl: 11    solifenacin (VESICARE) 10 MG tablet, Take 1 tablet by mouth every morning., Disp: , Rfl:     triamcinolone acetonide 0.1% (KENALOG) 0.1 % cream, Apply to the affected area twice daily.  Do not apply to face, underarms, or groin., Disp: , Rfl:     Review of patient's allergies indicates:   Allergen Reactions    Paxlovid [nirmatrelvir-ritonavir] Swelling    Adhesive tape-silicones     Latex, natural rubber     Albuterol Anxiety, Other (See Comments) and Palpitations       Smoking history:   Social History     Tobacco Use   Smoking Status Never   Smokeless Tobacco Never            Patient Concerns or Expectations:   NA  Therapist Comments:   Nehal CORRINE Tony  was seen 2/6/2025  4:51 PM in the Pulmonary Disease management clinic for evaluation, education, reinforcement of self management techniques and exacerbation action plan.    Brandy Justice    Past Medical History:   Diagnosis Date    Diabetes mellitus     Hypercholesteremia     Hypertension     Hypothyroidism     Osteopenia     Endocrinologist treating    Pneumonia              Educational assessment:   []            Good  [x]            Fair  []            Poor    Readiness to learn:   [x]            Good  []            Fair  []            Poor    Vision Status:   [x]            Good  []            Fair  []            Poor    Reading Ability:  [x]            Good  []            Fair  []            Poor    Knowledge of condition:   []            Good  [x]            Fair  []            Poor    Language Barriers:   []            Good  []            Fair  []            Poor  [x]            None    Cognitive/ Physical Barriers:   []            Good  []            Fair  []            Poor  [x]            None    Learning best by:                       [x]            Seeing  [x]            Hearing  [x]            Reading                         [x]            Doing    Describe any barrier /Limitation or financial implications of care  choices identified     []            Financial  []            Emotional  []            Education  []            Vision/Hearing  []            Physical  [x]            None  []                TOPIC /CONTENT FOR TODAY:    []            MDI with or without spacer  []            Dry powder inhaler  [x]            AEROBIKA   []           Peak Flow meter  []            COPD action plan  [x]            Nebulizer use  []            Oxygen use safety  [x]            Breathing and cough techniques  []            Energy conservation  []            Infection prevention  []           OTHER________________________        Learner:    [x]            Patient   [x]            Caregiver    Method:    [x]            Verbal explanation  [x]            Audio visual    [x]            Literature  [x]            Teach back      Evaluation:    [x]            Teach back  [x]            Demonstrate  [x]            Follow up phone call    []            2 weeks     []            4 weeks   [x]            PRN    Additional comments:   Patient provided with Aerobika and nebulizer with instructions for use. Patient practiced and demonstrated understanding. Literature provided.                 Plan:            MORNING   EVENING   HYPERTONIC SALINE  HYPERTONIC SALINE    Aerobika/COUGH Aerobika/COUGH         Reinforce education  Meds: 7% SALINE  DME Needs: NA  Action Plan  Immunization: Pneumococcal- CURRENT, Flu-CURRENT  Next Provider Visit: 3/11/25  Next Spirometry/CPFT: NOT SCHEDULED  Approximate time spent with patient: 70 MINUTES

## 2025-02-07 ENCOUNTER — PATIENT MESSAGE (OUTPATIENT)
Dept: PULMONOLOGY | Facility: CLINIC | Age: 80
End: 2025-02-07
Payer: MEDICARE

## 2025-02-07 DIAGNOSIS — A31.0 MAI (MYCOBACTERIUM AVIUM-INTRACELLULARE): Primary | ICD-10-CM

## 2025-02-07 RX ORDER — AZITHROMYCIN 500 MG/1
500 TABLET, FILM COATED ORAL
Qty: 36 TABLET | Refills: 3 | Status: SHIPPED | OUTPATIENT
Start: 2025-02-07 | End: 2026-02-07

## 2025-02-07 RX ORDER — RIFAMPIN 300 MG/1
600 CAPSULE ORAL
Qty: 72 CAPSULE | Refills: 3 | Status: SHIPPED | OUTPATIENT
Start: 2025-02-07 | End: 2026-02-07

## 2025-02-07 RX ORDER — ETHAMBUTOL HYDROCHLORIDE 400 MG/1
1200 TABLET, FILM COATED ORAL
Qty: 108 TABLET | Refills: 3 | Status: SHIPPED | OUTPATIENT
Start: 2025-02-07 | End: 2026-02-07

## 2025-02-07 NOTE — TELEPHONE ENCOUNTER
"Per Dr. Jewell, patient wanted to speak with her eye doctor before beginning tx. Per pt she is ready to proceed with tx. Dr. Jewell states he will send medication to pt pharmacy.    Per Dr. Jewell "Standing order in for CBC, CMP, and AFB home collect every 4 weeks; she has chest CT scheduled for July 16th; I would hold off on dropping off sputum until 4 weeks after she begins the meds"      Pt is confused about medication orders from Dr. Aldrich  "

## 2025-02-11 ENCOUNTER — LAB VISIT (OUTPATIENT)
Dept: LAB | Facility: HOSPITAL | Age: 80
End: 2025-02-11
Attending: STUDENT IN AN ORGANIZED HEALTH CARE EDUCATION/TRAINING PROGRAM
Payer: MEDICARE

## 2025-02-11 DIAGNOSIS — A31.0 MAI (MYCOBACTERIUM AVIUM-INTRACELLULARE): ICD-10-CM

## 2025-02-11 PROCEDURE — 87015 SPECIMEN INFECT AGNT CONCNTJ: CPT | Performed by: STUDENT IN AN ORGANIZED HEALTH CARE EDUCATION/TRAINING PROGRAM

## 2025-02-11 PROCEDURE — 87206 SMEAR FLUORESCENT/ACID STAI: CPT | Performed by: STUDENT IN AN ORGANIZED HEALTH CARE EDUCATION/TRAINING PROGRAM

## 2025-02-11 PROCEDURE — 87116 MYCOBACTERIA CULTURE: CPT | Performed by: STUDENT IN AN ORGANIZED HEALTH CARE EDUCATION/TRAINING PROGRAM

## 2025-02-13 LAB
ACID FAST MOD KINY STN SPEC: NORMAL
MYCOBACTERIUM SPEC QL CULT: NORMAL

## 2025-02-24 ENCOUNTER — LAB VISIT (OUTPATIENT)
Dept: LAB | Facility: HOSPITAL | Age: 80
End: 2025-02-24
Attending: STUDENT IN AN ORGANIZED HEALTH CARE EDUCATION/TRAINING PROGRAM
Payer: MEDICARE

## 2025-02-24 ENCOUNTER — OFFICE VISIT (OUTPATIENT)
Dept: INFECTIOUS DISEASES | Facility: CLINIC | Age: 80
End: 2025-02-24
Attending: INTERNAL MEDICINE
Payer: MEDICARE

## 2025-02-24 VITALS
SYSTOLIC BLOOD PRESSURE: 117 MMHG | WEIGHT: 112.88 LBS | DIASTOLIC BLOOD PRESSURE: 76 MMHG | OXYGEN SATURATION: 99 % | RESPIRATION RATE: 18 BRPM | HEART RATE: 86 BPM | BODY MASS INDEX: 17.11 KG/M2 | HEIGHT: 68 IN

## 2025-02-24 DIAGNOSIS — E03.9 HYPOTHYROIDISM, UNSPECIFIED TYPE: ICD-10-CM

## 2025-02-24 DIAGNOSIS — J47.9 ADULT BRONCHIECTASIS: ICD-10-CM

## 2025-02-24 DIAGNOSIS — J98.19 RIGHT MIDDLE LOBE SYNDROME: ICD-10-CM

## 2025-02-24 DIAGNOSIS — E78.49 OTHER HYPERLIPIDEMIA: ICD-10-CM

## 2025-02-24 DIAGNOSIS — A31.0 MAI (MYCOBACTERIUM AVIUM-INTRACELLULARE): Primary | ICD-10-CM

## 2025-02-24 DIAGNOSIS — I10 PRIMARY HYPERTENSION: ICD-10-CM

## 2025-02-24 DIAGNOSIS — Z12.31 ENCOUNTER FOR SCREENING MAMMOGRAM FOR BREAST CANCER: ICD-10-CM

## 2025-02-24 DIAGNOSIS — E11.9 DIABETES MELLITUS TYPE 2, NONINSULIN DEPENDENT: ICD-10-CM

## 2025-02-24 DIAGNOSIS — A31.0 MAI (MYCOBACTERIUM AVIUM-INTRACELLULARE): ICD-10-CM

## 2025-02-24 LAB
ALBUMIN SERPL BCP-MCNC: 3.9 G/DL (ref 3.5–5.2)
ALP SERPL-CCNC: 66 U/L (ref 40–150)
ALT SERPL W/O P-5'-P-CCNC: 28 U/L (ref 10–44)
ANION GAP SERPL CALC-SCNC: 10 MMOL/L (ref 8–16)
AST SERPL-CCNC: 32 U/L (ref 10–40)
BASOPHILS # BLD AUTO: 0.06 K/UL (ref 0–0.2)
BASOPHILS NFR BLD: 0.7 % (ref 0–1.9)
BILIRUB SERPL-MCNC: 1.1 MG/DL (ref 0.1–1)
BUN SERPL-MCNC: 16 MG/DL (ref 8–23)
CALCIUM SERPL-MCNC: 9.4 MG/DL (ref 8.7–10.5)
CHLORIDE SERPL-SCNC: 99 MMOL/L (ref 95–110)
CO2 SERPL-SCNC: 25 MMOL/L (ref 23–29)
CREAT SERPL-MCNC: 0.7 MG/DL (ref 0.5–1.4)
DIFFERENTIAL METHOD BLD: ABNORMAL
EOSINOPHIL # BLD AUTO: 0.1 K/UL (ref 0–0.5)
EOSINOPHIL NFR BLD: 0.6 % (ref 0–8)
ERYTHROCYTE [DISTWIDTH] IN BLOOD BY AUTOMATED COUNT: 13.5 % (ref 11.5–14.5)
EST. GFR  (NO RACE VARIABLE): >60 ML/MIN/1.73 M^2
GLUCOSE SERPL-MCNC: 120 MG/DL (ref 70–110)
HCT VFR BLD AUTO: 45.2 % (ref 37–48.5)
HGB BLD-MCNC: 14.6 G/DL (ref 12–16)
IMM GRANULOCYTES # BLD AUTO: 0.03 K/UL (ref 0–0.04)
IMM GRANULOCYTES NFR BLD AUTO: 0.4 % (ref 0–0.5)
LYMPHOCYTES # BLD AUTO: 1 K/UL (ref 1–4.8)
LYMPHOCYTES NFR BLD: 12.2 % (ref 18–48)
MCH RBC QN AUTO: 31.3 PG (ref 27–31)
MCHC RBC AUTO-ENTMCNC: 32.3 G/DL (ref 32–36)
MCV RBC AUTO: 97 FL (ref 82–98)
MONOCYTES # BLD AUTO: 0.6 K/UL (ref 0.3–1)
MONOCYTES NFR BLD: 6.8 % (ref 4–15)
NEUTROPHILS # BLD AUTO: 6.4 K/UL (ref 1.8–7.7)
NEUTROPHILS NFR BLD: 79.3 % (ref 38–73)
NRBC BLD-RTO: 0 /100 WBC
PLATELET # BLD AUTO: 262 K/UL (ref 150–450)
PMV BLD AUTO: 12.5 FL (ref 9.2–12.9)
POTASSIUM SERPL-SCNC: 4.6 MMOL/L (ref 3.5–5.1)
PROT SERPL-MCNC: 7 G/DL (ref 6–8.4)
RBC # BLD AUTO: 4.66 M/UL (ref 4–5.4)
SODIUM SERPL-SCNC: 134 MMOL/L (ref 136–145)
WBC # BLD AUTO: 8.06 K/UL (ref 3.9–12.7)

## 2025-02-24 PROCEDURE — 99214 OFFICE O/P EST MOD 30 MIN: CPT | Mod: S$PBB,,, | Performed by: STUDENT IN AN ORGANIZED HEALTH CARE EDUCATION/TRAINING PROGRAM

## 2025-02-24 PROCEDURE — 99999 PR PBB SHADOW E&M-EST. PATIENT-LVL IV: CPT | Mod: PBBFAC,,, | Performed by: STUDENT IN AN ORGANIZED HEALTH CARE EDUCATION/TRAINING PROGRAM

## 2025-02-24 PROCEDURE — 80053 COMPREHEN METABOLIC PANEL: CPT | Performed by: STUDENT IN AN ORGANIZED HEALTH CARE EDUCATION/TRAINING PROGRAM

## 2025-02-24 PROCEDURE — 99214 OFFICE O/P EST MOD 30 MIN: CPT | Mod: PBBFAC | Performed by: STUDENT IN AN ORGANIZED HEALTH CARE EDUCATION/TRAINING PROGRAM

## 2025-02-24 PROCEDURE — 85025 COMPLETE CBC W/AUTO DIFF WBC: CPT | Performed by: STUDENT IN AN ORGANIZED HEALTH CARE EDUCATION/TRAINING PROGRAM

## 2025-02-24 PROCEDURE — 36415 COLL VENOUS BLD VENIPUNCTURE: CPT | Performed by: STUDENT IN AN ORGANIZED HEALTH CARE EDUCATION/TRAINING PROGRAM

## 2025-02-24 NOTE — PROGRESS NOTES
"Infectious Disease Clinic Note    Patient Name: Nehal Jimenez  YOB: 1945    PRESENTING HISTORY       History of Present Illness:  Ms. Nehal Jimenez is a 79 y.o. female w/ significant PMHx of DM, HTN, HLD, and hypothyroidism referred by pulmonology due to positive cultures collected from right lung on 11/6/24. Underwent bronchoscopy at the time. Findings: "Scanty secretions. Lavage performed collectively sent for microbiology. 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 CT chest with tree in bud nodular disease at the time. Had been seen for COVID last year and admitted. Seen by pulmonology for follow up and decision made to perform bronch. Current symptoms: daily cough; post nasal drip after exercise. Used to cough up a lot of mucus. Discussed with patient that Acremonium is a mold that can contaminate surfaces. More concerning if she were immunocompromised. Not likely to cause infection in this case. More commonly infects skin and nails. CT findings more likely associated with MAC. Explained complexity of MAC treatment. Ethambutol has been known to cause irreversible blindness in some cases so patient needs to see eye specialist weekly to monthly. Rifampin/rifabutin with potential to raise LFT and cause GERD/GI symptoms. Azithromycin well tolerated. Due to mild disease would plan to use 3x weekly dosing for 12 months from negative sputum cultures. 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 Sees Dr Sofia with [facility]. Patient would like to discuss risks of treatment with him before deciding to pursue treatment. Also not able to produce sputum at this time. Will need to ask pulmonology for clearance regimen. Patient and  most concerned about weight loss.     2/7/25: Patient reported willingness to begin MAC therapy. Antibiotics sent to pharmacy.     2/24: Follow up. " Patient has been on thrice weekly MAC regimen for about two weeks. Tolerating. Reiterated need to see eye doctor regularly to ensure no evidence of optic neuritis. Will need sputum and blood work every 4 weeks to ensure normal CBC/LFT and sputum negative for bacteria. CT chest scheduled in July. Goal is unchanged or improving findings.         EXAMINATION:  CT CHEST WITHOUT CONTRAST     CLINICAL HISTORY:  Bronchiectasis, uncomplicatedRML atelectasis;     TECHNIQUE:  Axial CT images performed through the chest without intravenous contrast.     COMPARISON:  08/04/2024     FINDINGS:  Progression of the tree-in-bud micronodular opacities in the upper lobes.  The micro nodular opacities with bronchiectasis and right middle lobe atelectasis is otherwise stable in the lower lobes, lingula, and right middle lobe.  No effusions.  No thoracic adenopathy.     The upper abdominal visualized organs are within normal limits.     The bones are intact.     Impression:     Progression of the tree-in-bud micronodular opacities specifically in the upper lobes characteristic of an indolent infectious bronchiolitis such as DORI.     All CT scans at this facility are performed  using dose modulation techniques as appropriate to performed exam including the following:  automated exposure control; adjustment of mA and/or kV according to the patients size (this includes techniques or standardized protocols for targeted exams where dose is matched to indication/reason for exam: i.e. extremities or head);  iterative reconstruction technique.        Electronically signed by:Amadeo Avila MD  Date:                                            10/28/2024  Time:                                           10:53              Exam Ended: 10/28/24 10:27 CDT Last Resulted: 10/28/24 10:53 CDT            Procedure Component Value Units Date/Time   Culture, Respiratory with Gram Stain [[id number]] Collected: 11/06/24 0828   Order Status: Canceled Specimen:  Respiratory from Lung, RML     AFB Culture & Smear [[id number]] (Abnormal) Collected: 11/06/24 0828   Order Status: Completed Specimen: Respiratory from Lung, RML Updated: 12/11/24 1245     AFB Culture & Smear Culture positive, identification pending       Results acknowledged via secure chat by Dr. Brian Aldrich       11/20/2024  11:41       MYCOBACTERIUM SPECIES  Identification and susceptibility pending   Abnormal      AFB CULTURE STAIN No acid fast bacilli seen.   Fungus culture [[id number]] (Abnormal) Collected: 11/06/24 0828   Order Status: Completed Specimen: Respiratory from Lung, RML Updated: 12/16/24 0529     Fungus (Mycology) Culture ACREMONIUM SPECIES  Rare   Abnormal    KOH prep [[id number]] Collected: 11/06/24 0828   Order Status: Completed Specimen: Respiratory from Lung, RML Updated: 11/07/24 0133     KOH Prep No yeast or fungal elements seen   Culture Referred ID, Mycobacteria and Nocardia [[id number]] (Abnormal) Collected: 11/06/24 0828   Order Status: Completed Updated: 12/30/24 1410     Specimen and source - Mycobacterium RESP, Lung, RML     Mycobacteria culture ID + Susc, referred FINAL 12/30/2024 1408 Abnormal      Comment: SOURCE: LUNG, lung RML  MYCOBACTERIA CULTURE REFER ID+SUSC                     FINAL  MYCOBACTERIUM AVIUM COMPLEX    -  Per CLSI M24 Guidelines, LIZY data for ethambutol,  rifampin,and rifabutin have shown poor correlation  with clinical response. Therefore, although these  drugs are in the recommended treatment regimen,  breakpoints for these agents that separate  susceptible from resistant strains cannot be  determined and these drugs will not be reported.  A high or a low LIZY value provided by a laboratory  should not be used to determine whether these  drugs are useful in treatment regimens and we will  not test forward to other institutions for testing  of these agents because it is contrary to current  guidance-based therapy recommendations.  There are no  established interpretive guidelines for agents  reported without interpretations.  Previous comment was modified at 15:03 on 12/16/2024:  Negative for M. tuberculosis complex to date  by PCR.  ----------------------------------------------------------  Organism     MYCOBACTERIUM AVIUM COMPLEX  Antibiotic                   LIZY (mcg/mL)  Interpretation  ----------------------------------------------------------  Clofazimine                           0.5  Moxifloxacin                           >4       R  Clarithromycin                          2       S  Amikacin (IV)                          64       R  Amikacin (liposomal, inhaled)          64       S  Linezolid                              16       I  Moxifloxacin: The in vivo effectiveness of this agent for  MAC disease is unproven.  Clarithromycin: Clarithromycin is the class drug for  macrolides and the only macrolide that needs to be tested.  Linezolid: The in vivo effectiveness of this agent for MAC  disease is unproven.  ------------------------------------------------------------  S=SUSCEPTIBLE  I=INTERMEDIATE  R=RESISTANT  NS=NONSUSCEPTIBLE  SDD=SUSCEPTIBLE DOSE DEPENDENT  ------------------------------------------------------------    Test Performed by:  [facility] - [address] [address]  : Uziel Vick Ph.D.; CLIA# [id number]      Culture, Respiratory with Gram Stain [[id number]] Collected: 11/06/24 1337   Order Status: Completed Specimen: Bronchial Wash Updated: 11/09/24 1123     Respiratory Culture No growth     Gram Stain (Respiratory) <10 epithelial cells per low power field.     Gram Stain (Respiratory) Rare WBC's     Gram Stain (Respiratory) No organisms seen   AFB Culture & Smear [[id number]] Collected: 11/06/24 0826   Order Status: Completed Specimen: Bronchial Wash Updated: 12/04/24 1218     AFB Culture & Smear Culture positive, identification pending       Verbal report  given for previous positive culture     AFB CULTURE STAIN No acid fast bacilli seen.   Fungus culture [[id number]] Collected: 11/06/24 0826   Order Status: Completed Specimen: Bronchial Wash Updated: 12/09/24 1001     Fungus (Mycology) Culture No fungus isolated after 4 weeks   KOH prep [[id number]] Collected: 11/06/24 0826   Order Status: Completed Specimen: Bronchial Wash Updated: 11/07/24 0148     KOH Prep No yeast or fungal elements seen   Culture Referred for Identification, Mycobacterium [[id number]] (Abnormal) Collected: 11/06/24 0826   Order Status: Completed Updated: 12/23/24 1417     Culture referred for ID, Mycobacterium FINAL 12/23/2024 1415 Abnormal      Comment: SOURCE: BRONCHIAL WASHING, Bronchial Wash  CULTURE REFER FOR ID, MYCOBACTERIUM                    FINAL  MYCOBACTERIUM AVIUM COMPLEX    -  Previous comment was modified at 14:15 on 12/23/2024:  Negative for M. tuberculosis complex to date  by PCR.    Test Performed by:  [place] [address]  : Uziel Vick Ph.D.; CLIA# [id number]            Review of Systems:  Constitutional: no fever or chills  Eyes: no visual changes  ENT: no nasal congestion or sore throat  Respiratory: no cough or shortness of breath  Cardiovascular: no chest pain  Gastrointestinal: no nausea or vomiting, no abdominal pain, no constipation, no diarrhea  Genitourinary: no hematuria or dysuria  Musculoskeletal: no arthralgias or myalgias  Skin: no rash  Neurological: no headaches, numbness, or paresthesias    The following portions of the patient's history were reviewed and updated as appropriate: allergies, current medications, past family history, past medical history, past social history, past surgical history, and problem list.    PAST HISTORY:     Immunization History   Administered Date(s) Administered    COVID-19, MRNA, LN-S, PF (Pfizer) (Gray Cap) 07/06/2022    COVID-19, MRNA, LN-S,  PF (Pfizer) (Purple Cap) 01/13/2021, 02/03/2021, 10/04/2021    COVID-19, mRNA, LNP-S, PF, daniella-sucrose, 30 mcg/0.3 mL (Pfizer Ages 12+) 09/28/2024       Past Medical History:   Diagnosis Date    Diabetes mellitus     Hypercholesteremia     Hypertension     Hypothyroidism     Osteopenia     Endocrinologist treating    Pneumonia        Past Surgical History:   Procedure Laterality Date    BREAST BIOPSY      BREAST CYST ASPIRATION      BRONCHOSCOPY Bilateral 11/6/2024    Procedure: Bronchoscopy - insert lighted tube into airway to take a biopsy of lung;  Surgeon: Brian Aldrich MD;  Location: [facility] ENDO;  Service: Pulmonary;  Laterality: Bilateral;    COLONOSCOPY  2016    [facility]    ENDOSCOPY         Family History   Problem Relation Name Age of Onset    Colon cancer Father  86    Hodgkin's lymphoma Father         Social History     Socioeconomic History    Marital status:    Tobacco Use    Smoking status: Never    Smokeless tobacco: Never   Substance and Sexual Activity    Alcohol use: Never    Drug use: Never    Sexual activity: Not Currently     Partners: Male     Birth control/protection: None     Social Drivers of Health     Financial Resource Strain: Low Risk  (8/25/2024)    Overall Financial Resource Strain (CARDIA)     Difficulty of Paying Living Expenses: Not hard at all   Food Insecurity: No Food Insecurity (8/25/2024)    Hunger Vital Sign     Worried About Running Out of Food in the Last Year: Never true     Ran Out of Food in the Last Year: Never true   Transportation Needs: No Transportation Needs (8/5/2024)    TRANSPORTATION NEEDS     Transportation : No   Physical Activity: Sufficiently Active (8/25/2024)    Exercise Vital Sign     Days of Exercise per Week: 3 days     Minutes of Exercise per Session: 60 min   Stress: No Stress Concern Present (8/25/2024)    [employer] [place] [employer] - Occupational Stress Questionnaire     Feeling of Stress : Only a little   Housing Stability:  Unknown (8/25/2024)    Housing Stability Vital Sign     Unable to Pay for Housing in the Last Year: No       MEDICATIONS & ALLERGIES:     Current Outpatient Medications on File Prior to Visit   Medication Sig    azithromycin (ZITHROMAX) 500 MG tablet Take 1 tablet (500 mg total) by mouth 3 (three) times a week.    calcium citrate-vitamin D3 315-200 mg (CITRACAL+D) 315 mg-5 mcg (200 unit) per tablet Take 1 tablet by mouth 2 (two) times daily.    denosumab (PROLIA) 60 mg/mL Syrg Inject 60 mg into the skin. Patient takes every 6 months. Next due on 11/14/2024.    ethambutoL (MYAMBUTOL) 400 MG Tab Take 3 tablets (1,200 mg total) by mouth 3 (three) times a week.    lancets 33 gauge Misc USE EVERY DAY    levothyroxine (SYNTHROID) 125 MCG tablet Take by mouth.    losartan (COZAAR) 25 MG tablet losartan 25 mg tablet   TAKE 1 TABLET BY MOUTH DAILY    metFORMIN (GLUCOPHAGE-XR) 500 MG ER 24hr tablet Take 500 mg by mouth once daily.    mucus clearing device (ACAPELLA, FLUTTER) by Misc.(Non-Drug; Combo Route) route every 12 (twelve) hours.    mucus clearing device (AEROBIKA OSCILLATING PEP SYSTM) by Misc.(Non-Drug; Combo Route) route every 12 (twelve) hours.    ONETOUCH VERIO TEST STRIPS Strp SMARTSIG:Via Meter Daily    pravastatin (PRAVACHOL) 40 MG tablet Take 1 tablet by mouth once daily.    prednisoLONE acetate (PRED FORTE) 1 % DrpS Place 1 drop into both eyes.    predniSONE (DELTASONE) 10 MG tablet Take 4 tabs on day 1, take 3 days on day 2, take 2 tabs on days 3-4, take 1 tab on days 5-7    promethazine-dextromethorphan (PROMETHAZINE-DM) 6.25-15 mg/5 mL Syrp Take 5 mLs by mouth 4 (four) times daily as needed.    pulse oximeter (PULSE OXIMETER) device by Apply Externally route 2 (two) times a day. Use twice daily at 8 AM and 3 PM and record the value in "[employer] as directed.    rifAMpin (RIFADIN) 300 MG capsule Take 2 capsules (600 mg total) by mouth 3 (three) times a week.    sodium chloride 7% 7 % nebulizer solution Take 4  mLs by nebulization every 12 (twelve) hours.    solifenacin (VESICARE) 10 MG tablet Take 1 tablet by mouth every morning.    triamcinolone acetonide 0.1% (KENALOG) 0.1 % cream Apply to the affected area twice daily.  Do not apply to face, underarms, or groin.     No current facility-administered medications on file prior to visit.       Review of patient's allergies indicates:   Allergen Reactions    Paxlovid [nirmatrelvir-ritonavir] Swelling    Adhesive tape-silicones     Latex, natural rubber     Albuterol Anxiety, Other (See Comments) and Palpitations       OBJECTIVE:   Vital Signs:  There were no vitals filed for this visit.    No results found for this or any previous visit (from the past 24 hours).      Physical Exam:   General:  Well developed, well nourished, no acute distress  HEENT:  Normocephalic, atraumatic  CVS:  RRR, S1 and S2 normal, no murmurs, rubs, gallops  Resp:  Lungs clear to auscultation, no wheezes, rales, rhonchi  GI:  Abdomen soft, non-tender, non-distended, normoactive bowel sounds, no masses  MSK:  No muscle atrophy, peripheral edema, full range of motion  Skin:  No rashes, ulcers, erythema  Psych:  Alert and oriented to person, place, and time    ASSESSMENT:     Pulmonary MAC  Using as guide 2020-Treatment of Nontuberculous Mycobacterial Pulmonary Disease: An Official ATS/ERS/ESCMID/IDSA Clinical Practice Guideline: Executive Summary -  Therapy needs combination of clinical, microbiologic and radiologic therapy .  Clinical and radiologic criteria (all required) 1. Pulmonary or systemic symptoms and 2. Nodular or cavitary opacities on chest radiograph or bronchiectasis with multiple small nodules on high-resolution computed tomography and 3. Appropriate exclusion of other diagnoses        Clinical and microbiological criteria for diagnosing non-tuberculous mycobacterial lung disease (wendy et al )    Clinical ( Both required)   1.  Pulmonary symptoms, nodular or cavitary opacities on  chest radiograph, or a high-resolution CT scan that shows multifocal bronchiectasis with multiple small nodules.    2.  Appropriate exclusion of other diagnoses.   Microbiological ( One required)   1. Positive culture results from at least two separate expectorated sputum samples; if the results are nondiagnostic,consider repeat sputum AFB smears and cultures.   2. Positive culture results from at least one bronchial wash or Lavage   3. Transbronchial or other lung biopsy with mycobacterial histopathological features (granulomatous inflammation or AFB) and positive culture for NTM or biopsy showing mycobacterial histopathological features (granulomatous inflammation or AFB) and one or more sputum or bronchial washings that are culture-positive for NTM.      --AFB + 11/6 from BAL   --Nodular disease noted on recent CT imaging   --Would consider this mild disease   --At this time patient not reporting classic symptoms; difficult to cough up sputum for surveillance which would make treatment challenging   --Otherwise meets criteria for thrice weekly treatment   --Proposed treatment regimen: PO azithromycin 500 mg 3x weekly, rifampin 600 mg 3x weekly, and ethambutol 25 mg/kg (1200 mg) 3x weekly   --Discussed possible side effects including vision changes/blindness from ethambutol, elevated LFT from rifampin, and prolonged QT from azithromycin (less likely as patient not on other QT prolonging agents)   --Monthly CBC and CMP while on treatment   --Patient will need monthly vision testing while on ethambutol  --Patient has opted to begin treatment; antibiotics sent to [facility] on file Feb 7th   --Plan to collect AFB sputum culture every 4 weeks   --Clinical expectations: symptomatic and radiographic improvement within 2-6 months and conversion of sputum culture from positive to negative within approximately 6 months   --Will plan to treat for at least 12 months after sputum culture becomes negative for MAC   --Recommend  adjunctive bronchial hygiene with 7% nebulizer and Aerobika via pulmonology especially since patient struggling to produce sputum   --Will check CT chest in July   --Follow up with me in 3 months      DM  Continue current medications and follow up with PCP       HTN  Continue current medications and follow up with PCP       HLD  Continue current medications and follow up with PCP     Hypothyroidism   Continue current medications and follow up with PCP      PLAN:     Diagnoses and all orders for this visit:    DORI (mycobacterium avium-intracellulare)    Right middle lobe syndrome    Adult bronchiectasis    Diabetes mellitus type 2, noninsulin dependent    Primary hypertension    Other hyperlipidemia    Hypothyroidism, unspecified type    Encounter for screening mammogram for breast cancer  -     Mammo Digital Screening Bil[facility] w/ [facility]          The total time for evaluation and management services performed on 2/24/25 was greater than 25 minutes.        Anand Jewell, DO   Infectious Diseases

## 2025-03-11 ENCOUNTER — OFFICE VISIT (OUTPATIENT)
Dept: PULMONOLOGY | Facility: CLINIC | Age: 80
End: 2025-03-11
Payer: MEDICARE

## 2025-03-11 ENCOUNTER — LAB VISIT (OUTPATIENT)
Dept: LAB | Facility: HOSPITAL | Age: 80
End: 2025-03-11
Attending: STUDENT IN AN ORGANIZED HEALTH CARE EDUCATION/TRAINING PROGRAM
Payer: MEDICARE

## 2025-03-11 VITALS
SYSTOLIC BLOOD PRESSURE: 124 MMHG | WEIGHT: 115 LBS | HEART RATE: 90 BPM | OXYGEN SATURATION: 93 % | HEIGHT: 68 IN | RESPIRATION RATE: 16 BRPM | DIASTOLIC BLOOD PRESSURE: 74 MMHG | BODY MASS INDEX: 17.43 KG/M2

## 2025-03-11 DIAGNOSIS — E78.49 OTHER HYPERLIPIDEMIA: ICD-10-CM

## 2025-03-11 DIAGNOSIS — A31.0 MAI (MYCOBACTERIUM AVIUM-INTRACELLULARE): ICD-10-CM

## 2025-03-11 DIAGNOSIS — E03.9 HYPOTHYROIDISM, UNSPECIFIED TYPE: ICD-10-CM

## 2025-03-11 DIAGNOSIS — I10 PRIMARY HYPERTENSION: ICD-10-CM

## 2025-03-11 DIAGNOSIS — J47.9 ADULT BRONCHIECTASIS: ICD-10-CM

## 2025-03-11 DIAGNOSIS — E11.9 DIABETES MELLITUS TYPE 2, NONINSULIN DEPENDENT: ICD-10-CM

## 2025-03-11 DIAGNOSIS — J98.19 RIGHT MIDDLE LOBE SYNDROME: Primary | ICD-10-CM

## 2025-03-11 LAB
BASOPHILS # BLD AUTO: 0.04 K/UL (ref 0–0.2)
BASOPHILS NFR BLD: 0.8 % (ref 0–1.9)
DIFFERENTIAL METHOD BLD: ABNORMAL
EOSINOPHIL # BLD AUTO: 0.1 K/UL (ref 0–0.5)
EOSINOPHIL NFR BLD: 2.6 % (ref 0–8)
ERYTHROCYTE [DISTWIDTH] IN BLOOD BY AUTOMATED COUNT: 13.7 % (ref 11.5–14.5)
HCT VFR BLD AUTO: 44.2 % (ref 37–48.5)
HGB BLD-MCNC: 14 G/DL (ref 12–16)
IMM GRANULOCYTES # BLD AUTO: 0.01 K/UL (ref 0–0.04)
IMM GRANULOCYTES NFR BLD AUTO: 0.2 % (ref 0–0.5)
LYMPHOCYTES # BLD AUTO: 1.1 K/UL (ref 1–4.8)
LYMPHOCYTES NFR BLD: 21.5 % (ref 18–48)
MCH RBC QN AUTO: 30.9 PG (ref 27–31)
MCHC RBC AUTO-ENTMCNC: 31.7 G/DL (ref 32–36)
MCV RBC AUTO: 98 FL (ref 82–98)
MONOCYTES # BLD AUTO: 0.5 K/UL (ref 0.3–1)
MONOCYTES NFR BLD: 9.5 % (ref 4–15)
NEUTROPHILS # BLD AUTO: 3.3 K/UL (ref 1.8–7.7)
NEUTROPHILS NFR BLD: 65.4 % (ref 38–73)
NRBC BLD-RTO: 0 /100 WBC
PLATELET # BLD AUTO: 227 K/UL (ref 150–450)
PMV BLD AUTO: 12.3 FL (ref 9.2–12.9)
RBC # BLD AUTO: 4.53 M/UL (ref 4–5.4)
WBC # BLD AUTO: 4.97 K/UL (ref 3.9–12.7)

## 2025-03-11 PROCEDURE — 36415 COLL VENOUS BLD VENIPUNCTURE: CPT | Performed by: STUDENT IN AN ORGANIZED HEALTH CARE EDUCATION/TRAINING PROGRAM

## 2025-03-11 PROCEDURE — 85025 COMPLETE CBC W/AUTO DIFF WBC: CPT | Performed by: STUDENT IN AN ORGANIZED HEALTH CARE EDUCATION/TRAINING PROGRAM

## 2025-03-11 PROCEDURE — 80053 COMPREHEN METABOLIC PANEL: CPT | Performed by: STUDENT IN AN ORGANIZED HEALTH CARE EDUCATION/TRAINING PROGRAM

## 2025-03-11 PROCEDURE — 99999 PR PBB SHADOW E&M-EST. PATIENT-LVL V: CPT | Mod: PBBFAC,,, | Performed by: INTERNAL MEDICINE

## 2025-03-11 PROCEDURE — 99215 OFFICE O/P EST HI 40 MIN: CPT | Mod: PBBFAC | Performed by: INTERNAL MEDICINE

## 2025-03-11 NOTE — PROGRESS NOTES
Pulmonary Outpatient  Visit     Subjective:       Patient ID: Nehal Jimenez is a 79 y.o. female.    Social History     Tobacco Use   Smoking Status Never   Smokeless Tobacco Never            Chief Complaint: Bronchiectasis          Nehal Jimenez is 79 y.o.  Follow-up visit hospital discharge   Right middle lobe pneumonia   Did not tolerate Levaquin changed to Augmentin   History of COVID  This is a reviewed discharge summary    10/28/2024  Followup  Reviewed chest ct  Shows progressio  Will need Bronch, BAL  Denies sputum expectoration   Denies fever denies night sweats   Generally fastidious eater due to her diabetes   Chest CT reviewed  Shows progression of atypical mycobacteria  Tree-in-bud appearance  Indication for bronchoscopy discussed    12/10/2024   Follow-up visit   Bronchoscopy results reviewed  Incidental finding of atypical mycobacteria   Non symptomatic   We will refer to Infectious diseases   Final ID pending  No symptoms other than just gradual weight loss    03/11/2025   Follow-up visit   Spouse present  On azith, Rif, etham  Tolerating meds  Scanty sputum  Adherent with bronchiectasis  Has gained weight             Review of Systems   Constitutional:  Negative for fever and fatigue.   Respiratory: Negative.  Negative for snoring and cough.    All other systems reviewed and are negative.      Outpatient Encounter Medications as of 3/11/2025   Medication Sig Dispense Refill    azithromycin (ZITHROMAX) 500 MG tablet Take 1 tablet (500 mg total) by mouth 3 (three) times a week. 36 tablet 3    calcium citrate-vitamin D3 315-200 mg (CITRACAL+D) 315 mg-5 mcg (200 unit) per tablet Take 1 tablet by mouth 2 (two) times daily.      denosumab (PROLIA) 60 mg/mL Syrg Inject 60 mg into the skin. Patient takes every 6 months. Next due on 11/14/2024.      ethambutoL (MYAMBUTOL) 400 MG Tab Take 3 tablets (1,200 mg total) by mouth 3 (three) times a week. 108 tablet 3     lancets 33 gauge Misc USE EVERY DAY      levothyroxine (SYNTHROID) 125 MCG tablet Take by mouth.      losartan (COZAAR) 25 MG tablet losartan 25 mg tablet   TAKE 1 TABLET BY MOUTH DAILY      metFORMIN (GLUCOPHAGE-XR) 500 MG ER 24hr tablet Take 500 mg by mouth once daily.      mucus clearing device (ACAPELLA, FLUTTER) by Misc.(Non-Drug; Combo Route) route every 12 (twelve) hours. 1 each 0    mucus clearing device (AEROBIKA OSCILLATING PEP SYSTM) by Misc.(Non-Drug; Combo Route) route every 12 (twelve) hours. 1 each 0    ONETOUCH VERIO TEST STRIPS Strp SMARTSIG:Via Meter Daily      pravastatin (PRAVACHOL) 40 MG tablet Take 1 tablet by mouth once daily.      prednisoLONE acetate (PRED FORTE) 1 % DrpS Place 1 drop into both eyes.      pulse oximeter (PULSE OXIMETER) device by Apply Externally route 2 (two) times a day. Use twice daily at 8 AM and 3 PM and [facility] the value [facility] as directed. 1 each 0    rifAMpin (RIFADIN) 300 MG capsule Take 2 capsules (600 mg total) by mouth 3 (three) times a week. 72 capsule 3    sodium chloride 7% 7 % nebulizer solution Take 4 mLs by nebulization every 12 (twelve) hours. 240 mL 11    solifenacin (VESICARE) 10 MG tablet Take 1 tablet by mouth every morning.      triamcinolone acetonide 0.1% (KENALOG) 0.1 % cream Apply to the affected area twice daily.  Do not apply to face, underarms, or groin.      [DISCONTINUED] predniSONE (DELTASONE) 10 MG tablet Take 4 tabs on day 1, take 3 days on day 2, take 2 tabs on days 3-4, take 1 tab on days 5-7 14 tablet 0    [DISCONTINUED] promethazine-dextromethorphan (PROMETHAZINE-DM) 6.25-15 mg/5 mL Syrp Take 5 mLs by mouth 4 (four) times daily as needed.       No facility-administered encounter medications on file as of 3/11/2025.           Pertinent Work Up:CXR, chest CT      Pulmonary Interventions:Abx: levaquin and augmentin      Smoking hx:none  Environmental/Occupational hx:retired           The following portions of the  patient's history were reviewed and updated as appropriate: She  has a past medical history of Diabetes mellitus, Hypercholesteremia, Hypertension, Hypothyroidism, Osteopenia, and Pneumonia.  She does not have any pertinent problems on file.  She  has a past surgical history that includes Breast biopsy; Breast cyst aspiration; Colonoscopy (2016); Endoscopy; and Bronchoscopy (Bilateral, 11/6/2024).  Her family history includes Colon cancer (age of onset: 86) in her father; Hodgkin's lymphoma in her father.  She  reports that she has never smoked. She has never used smokeless tobacco. She reports that she does not drink alcohol and does not use drugs.  She has a current medication list which includes the following prescription(s): azithromycin, calcium citrate-vitamin d3 315-200 mg, prolia, ethambutol, lancets, levothyroxine, losartan, metformin, mucus clearing device, mucus clearing device, onetouch verio test strips, pravastatin, prednisolone acetate, pulse oximeter, rifampin, sodium chloride 7%, solifenacin, and triamcinolone acetonide 0.1%.  Current Outpatient Medications on File Prior to Visit   Medication Sig Dispense Refill    azithromycin (ZITHROMAX) 500 MG tablet Take 1 tablet (500 mg total) by mouth 3 (three) times a week. 36 tablet 3    calcium citrate-vitamin D3 315-200 mg (CITRACAL+D) 315 mg-5 mcg (200 unit) per tablet Take 1 tablet by mouth 2 (two) times daily.      denosumab (PROLIA) 60 mg/mL Syrg Inject 60 mg into the skin. Patient takes every 6 months. Next due on 11/14/2024.      ethambutoL (MYAMBUTOL) 400 MG Tab Take 3 tablets (1,200 mg total) by mouth 3 (three) times a week. 108 tablet 3    lancets 33 gauge Misc USE EVERY DAY      levothyroxine (SYNTHROID) 125 MCG tablet Take by mouth.      losartan (COZAAR) 25 MG tablet losartan 25 mg tablet   TAKE 1 TABLET BY MOUTH DAILY      metFORMIN (GLUCOPHAGE-XR) 500 MG ER 24hr tablet Take 500 mg by mouth once daily.      mucus clearing device  (ACAPELLA, FLUTTER) by Misc.(Non-Drug; Combo Route) route every 12 (twelve) hours. 1 each 0    mucus clearing device (AEROBIKA OSCILLATING PEP SYSTM) by Misc.(Non-Drug; Combo Route) route every 12 (twelve) hours. 1 each 0    ONETOUCH VERIO TEST STRIPS Strp SMARTSIG:Via Meter Daily      pravastatin (PRAVACHOL) 40 MG tablet Take 1 tablet by mouth once daily.      prednisoLONE acetate (PRED FORTE) 1 % DrpS Place 1 drop into both eyes.      pulse oximeter (PULSE OXIMETER) device by Apply Externally route 2 (two) times a day. Use twice daily at 8 AM and 3 PM and record the value in [employer] as directed. 1 each 0    rifAMpin (RIFADIN) 300 MG capsule Take 2 capsules (600 mg total) by mouth 3 (three) times a week. 72 capsule 3    sodium chloride 7% 7 % nebulizer solution Take 4 mLs by nebulization every 12 (twelve) hours. 240 mL 11    solifenacin (VESICARE) 10 MG tablet Take 1 tablet by mouth every morning.      triamcinolone acetonide 0.1% (KENALOG) 0.1 % cream Apply to the affected area twice daily.  Do not apply to face, underarms, or groin.       No current facility-administered medications on file prior to visit.     She is allergic to paxlovid [nirmatrelvir-ritonavir]; adhesive tape-silicones; latex, natural rubber; and albuterol..      BP Readings from Last 3 Encounters:   03/11/25 124/74   02/24/25 117/76   01/28/25 125/75            MMRC Dyspnea Scale (4 is worst)     [] MMRC 0: Dyspneic on strenuous excercise (0 points)    [] MMRC 1: Dyspneic on walking a slight hill (0 points)    [] MMRC 2: Dyspneic on walking level ground; must stop occasionally due to breathlessness (1 point)    [] MMRC 3: Must stop for breathlessness after walking 100 yards or after a few minutes (2 points)    [] MMRC 4: Cannot leave house; breathless on dressing/undressing (3 points)                          No data to display                          Objective:     Vital Signs (Most Recent)  Vital Signs  Pulse: 90  Resp: 16  SpO2:  "(!) 93 %  BP: 124/74  Height and Weight  Height: 5' 8" (172.7 cm)  Weight: 52.2 kg (114 lb 15.5 oz)  BSA (Calculated - sq m): 1.58 sq meters  BMI (Calculated): 17.5  Weight in (lb) to have BMI = 25: 164.1]  Wt Readings from Last 2 Encounters:   03/11/25 52.2 kg (114 lb 15.5 oz)   02/24/25 51.2 kg (112 lb 14 oz)       Physical Exam  Vitals and nursing note reviewed.   Constitutional:       Appearance: She is normal weight.   HENT:      Head: Normocephalic and atraumatic.      Nose: Nose normal.   Eyes:      Pupils: Pupils are equal, round, and reactive to light.   Cardiovascular:      Rate and Rhythm: Normal rate and regular rhythm.      Pulses: Normal pulses.      Heart sounds: Normal heart sounds.   Pulmonary:      Effort: Pulmonary effort is normal.      Breath sounds: Normal breath sounds.   Abdominal:      General: Bowel sounds are normal.      Palpations: Abdomen is soft.   Musculoskeletal:      Cervical back: Normal range of motion.   Skin:     General: Skin is warm.   Neurological:      General: No focal deficit present.      Mental Status: She is alert and oriented to person, place, and time.   Psychiatric:         Mood and Affect: Mood normal.        Laboratory  Lab Results   Component Value Date    WBC 8.06 02/24/2025    RBC 4.66 02/24/2025    HGB 14.6 02/24/2025    HCT 45.2 02/24/2025    MCV 97 02/24/2025    MCH 31.3 (H) 02/24/2025    MCHC 32.3 02/24/2025    RDW 13.5 02/24/2025     02/24/2025    MPV 12.5 02/24/2025    GRAN 6.4 02/24/2025    GRAN 79.3 (H) 02/24/2025    LYMPH 1.0 02/24/2025    LYMPH 12.2 (L) 02/24/2025    MONO 0.6 02/24/2025    MONO 6.8 02/24/2025    EOS 0.1 02/24/2025    BASO 0.06 02/24/2025    EOSINOPHIL 0.6 02/24/2025    BASOPHIL 0.7 02/24/2025       BMP  Lab Results   Component Value Date     (L) 02/24/2025    K 4.6 02/24/2025    CL 99 02/24/2025    CO2 25 02/24/2025    BUN 16 02/24/2025    CREATININE 0.7 02/24/2025    CALCIUM 9.4 02/24/2025    ANIONGAP 10 02/24/2025    " "ESTGFRAFRICA 86 09/23/2021    AST 32 02/24/2025    ALT 28 02/24/2025    PROT 7.0 02/24/2025          No results found for: "IGE"     Lab Results   Component Value Date    ASPERGILLUS Not Detected 08/05/2024     No results found for: "AFUMIGATUSCL"     No results found for: "ACE"     Diagnostic Results:  I have personally reviewed today the following studies:    XR CHEST PA AND LATERAL  Narrative: EXAM: XR CHEST PA AND LATERAL    CLINICAL HISTORY: Cough.    FINDINGS:  Comparison made to prior chest x-ray dated 12/19/2024.  Subsegmental atelectasis and/or infiltrate in the anterior segment right upper lobe along the minor fissure.  COPD.  No pleural effusion or pneumothorax or pulmonary edema.  Normal heart size.  Impression:  See above    Finalized on: 12/27/2024 5:38 PM By:  Bran Boyd MD  [facility]# [id number]      2024-12-27 17:[id number]    [facility]             AFB Culture & Smear  Order: [id number]   Status: Preliminary result       Next appt: Today at 02:30 PM in Lab ([facility])       Dx: DORI (mycobacterium avium-intracellulare)    Test Result Released: No    Specimen Information: Sputum   0 Result Notes      Component  Ref Range & Units (hover) 4 wk ago   AFB Culture & Smear Culture in progress P   AFB CULTURE STAIN No acid fast bacilli seen.   Resulting Agency [facility]              Narrative  Performed by: [facility]  Send normal result to authorizing provider's In Basket if  patient is active on MyChart:->Yes           Assessment/Plan:     Problem List Items Addressed This Visit       Right middle lobe syndrome - Primary    Relevant Orders    X-Ray Chest PA And Lateral    Spirometry without Bronchodilator    Adult bronchiectasis    Relevant Orders    X-Ray Chest PA And Lateral    Spirometry without Bronchodilator    Diabetes mellitus type 2, noninsulin dependent    On metformin         Primary hypertension    On Losartan, lisinopril         Hyperlipidemia    Pravachol         Hypothyroidism    Synthroid         DORI " (mycobacterium avium-intracellulare)    Mes Mno, Wed, Fri  Azithro, Rif. ethambutol  Nebulizer BID  Aerobika         Relevant Orders    X-Ray Chest PA And Lateral    Spirometry without Bronchodilator         Tolerating medication regimen well   Follow up in 12 months  Return to clinic early if concerns     Follow up in about 1 year (around 3/11/2026), or cxr, clarisse.    This note was prepared using voice recognition system and is likely to have sound alike errors that may have been overlooked even after proof reading.  Please call me with any questions    Discussed diagnosis, its evaluation, treatment and usual course. All questions answered.    Thank you for the courtesy of participating in the care of this patient    Brian Aldrich MD      Personal Diagnostic Review  []  CXR    []  ECHO    []  ONSAT    []  6MWD    []  LABS    []  CHEST CT    []  PET CT    []  Biopsy results

## 2025-03-12 ENCOUNTER — TELEPHONE (OUTPATIENT)
Dept: INFECTIOUS DISEASES | Facility: CLINIC | Age: 80
End: 2025-03-12
Payer: MEDICARE

## 2025-03-12 LAB
ALBUMIN SERPL BCP-MCNC: 3.9 G/DL (ref 3.5–5.2)
ALP SERPL-CCNC: 62 U/L (ref 40–150)
ALT SERPL W/O P-5'-P-CCNC: 24 U/L (ref 10–44)
ANION GAP SERPL CALC-SCNC: 8 MMOL/L (ref 8–16)
AST SERPL-CCNC: 27 U/L (ref 10–40)
BILIRUB SERPL-MCNC: 0.4 MG/DL (ref 0.1–1)
BUN SERPL-MCNC: 19 MG/DL (ref 8–23)
CALCIUM SERPL-MCNC: 9.8 MG/DL (ref 8.7–10.5)
CHLORIDE SERPL-SCNC: 101 MMOL/L (ref 95–110)
CO2 SERPL-SCNC: 26 MMOL/L (ref 23–29)
CREAT SERPL-MCNC: 1 MG/DL (ref 0.5–1.4)
EST. GFR  (NO RACE VARIABLE): 57.7 ML/MIN/1.73 M^2
GLUCOSE SERPL-MCNC: 108 MG/DL (ref 70–110)
POTASSIUM SERPL-SCNC: 4.9 MMOL/L (ref 3.5–5.1)
PROT SERPL-MCNC: 6.8 G/DL (ref 6–8.4)
SODIUM SERPL-SCNC: 135 MMOL/L (ref 136–145)

## 2025-03-12 NOTE — TELEPHONE ENCOUNTER
----- Message from Bhavesh sent at 3/12/2025 11:20 AM CDT -----  Regarding: Sputum Testing  Linked by mistake by registration when patient came for labwork. Please reorder and reschedule testing.

## 2025-03-13 ENCOUNTER — TELEPHONE (OUTPATIENT)
Dept: INFECTIOUS DISEASES | Facility: CLINIC | Age: 80
End: 2025-03-13
Payer: MEDICARE

## 2025-03-13 NOTE — TELEPHONE ENCOUNTER
----- Message from Sarita sent at 3/13/2025  9:28 AM CDT -----  Contact: Nehal  .Patient is calling to speak with the nurse regarding questions . Reports  wanting to know why the patient have all these labs scheduled . Please give patient a call back at .[phone]

## 2025-03-14 ENCOUNTER — LAB VISIT (OUTPATIENT)
Dept: LAB | Facility: HOSPITAL | Age: 80
End: 2025-03-14
Attending: STUDENT IN AN ORGANIZED HEALTH CARE EDUCATION/TRAINING PROGRAM
Payer: MEDICARE

## 2025-03-14 DIAGNOSIS — A31.0 MAI (MYCOBACTERIUM AVIUM-INTRACELLULARE): ICD-10-CM

## 2025-03-14 LAB
ALBUMIN SERPL BCP-MCNC: 3.8 G/DL (ref 3.5–5.2)
ALP SERPL-CCNC: 57 U/L (ref 40–150)
ALT SERPL W/O P-5'-P-CCNC: 26 U/L (ref 10–44)
ANION GAP SERPL CALC-SCNC: 9 MMOL/L (ref 8–16)
AST SERPL-CCNC: 20 U/L (ref 10–40)
BASOPHILS # BLD AUTO: 0.04 K/UL (ref 0–0.2)
BASOPHILS NFR BLD: 0.9 % (ref 0–1.9)
BILIRUB SERPL-MCNC: 0.8 MG/DL (ref 0.1–1)
BUN SERPL-MCNC: 16 MG/DL (ref 8–23)
CALCIUM SERPL-MCNC: 9.2 MG/DL (ref 8.7–10.5)
CHLORIDE SERPL-SCNC: 98 MMOL/L (ref 95–110)
CO2 SERPL-SCNC: 27 MMOL/L (ref 23–29)
CREAT SERPL-MCNC: 0.8 MG/DL (ref 0.5–1.4)
DIFFERENTIAL METHOD BLD: NORMAL
EOSINOPHIL # BLD AUTO: 0.1 K/UL (ref 0–0.5)
EOSINOPHIL NFR BLD: 1.1 % (ref 0–8)
ERYTHROCYTE [DISTWIDTH] IN BLOOD BY AUTOMATED COUNT: 13.5 % (ref 11.5–14.5)
EST. GFR  (NO RACE VARIABLE): >60 ML/MIN/1.73 M^2
GLUCOSE SERPL-MCNC: 231 MG/DL (ref 70–110)
HCT VFR BLD AUTO: 44.6 % (ref 37–48.5)
HGB BLD-MCNC: 14.3 G/DL (ref 12–16)
IMM GRANULOCYTES # BLD AUTO: 0.02 K/UL (ref 0–0.04)
IMM GRANULOCYTES NFR BLD AUTO: 0.4 % (ref 0–0.5)
LYMPHOCYTES # BLD AUTO: 1 K/UL (ref 1–4.8)
LYMPHOCYTES NFR BLD: 21.3 % (ref 18–48)
MCH RBC QN AUTO: 31 PG (ref 27–31)
MCHC RBC AUTO-ENTMCNC: 32.1 G/DL (ref 32–36)
MCV RBC AUTO: 97 FL (ref 82–98)
MONOCYTES # BLD AUTO: 0.3 K/UL (ref 0.3–1)
MONOCYTES NFR BLD: 6 % (ref 4–15)
NEUTROPHILS # BLD AUTO: 3.3 K/UL (ref 1.8–7.7)
NEUTROPHILS NFR BLD: 70.3 % (ref 38–73)
NRBC BLD-RTO: 0 /100 WBC
PLATELET # BLD AUTO: 200 K/UL (ref 150–450)
PMV BLD AUTO: 12.6 FL (ref 9.2–12.9)
POTASSIUM SERPL-SCNC: 3.9 MMOL/L (ref 3.5–5.1)
PROT SERPL-MCNC: 6.6 G/DL (ref 6–8.4)
RBC # BLD AUTO: 4.61 M/UL (ref 4–5.4)
SODIUM SERPL-SCNC: 134 MMOL/L (ref 136–145)
WBC # BLD AUTO: 4.64 K/UL (ref 3.9–12.7)

## 2025-03-14 PROCEDURE — 36415 COLL VENOUS BLD VENIPUNCTURE: CPT | Mod: PO | Performed by: STUDENT IN AN ORGANIZED HEALTH CARE EDUCATION/TRAINING PROGRAM

## 2025-03-14 PROCEDURE — 80053 COMPREHEN METABOLIC PANEL: CPT | Performed by: STUDENT IN AN ORGANIZED HEALTH CARE EDUCATION/TRAINING PROGRAM

## 2025-03-14 PROCEDURE — 85025 COMPLETE CBC W/AUTO DIFF WBC: CPT | Performed by: STUDENT IN AN ORGANIZED HEALTH CARE EDUCATION/TRAINING PROGRAM

## 2025-03-18 ENCOUNTER — HOSPITAL ENCOUNTER (OUTPATIENT)
Dept: RADIOLOGY | Facility: HOSPITAL | Age: 80
Discharge: HOME | End: 2025-03-18
Attending: INTERNAL MEDICINE
Payer: MEDICARE

## 2025-03-18 DIAGNOSIS — J98.19 RIGHT MIDDLE LOBE SYNDROME: ICD-10-CM

## 2025-03-18 DIAGNOSIS — J47.9 ADULT BRONCHIECTASIS: ICD-10-CM

## 2025-03-18 DIAGNOSIS — A31.0 MAI (MYCOBACTERIUM AVIUM-INTRACELLULARE): ICD-10-CM

## 2025-03-18 PROCEDURE — 71046 X-RAY EXAM CHEST 2 VIEWS: CPT | Mod: 26,,, | Performed by: STUDENT IN AN ORGANIZED HEALTH CARE EDUCATION/TRAINING PROGRAM

## 2025-03-18 PROCEDURE — 71046 X-RAY EXAM CHEST 2 VIEWS: CPT | Mod: TC

## 2025-03-19 ENCOUNTER — RESULTS FOLLOW-UP (OUTPATIENT)
Dept: SLEEP MEDICINE | Facility: CLINIC | Age: 80
End: 2025-03-19

## 2025-04-10 ENCOUNTER — LAB VISIT (OUTPATIENT)
Dept: LAB | Facility: HOSPITAL | Age: 80
End: 2025-04-10
Attending: STUDENT IN AN ORGANIZED HEALTH CARE EDUCATION/TRAINING PROGRAM
Payer: MEDICARE

## 2025-04-10 DIAGNOSIS — A31.0 MAI (MYCOBACTERIUM AVIUM-INTRACELLULARE): ICD-10-CM

## 2025-04-10 LAB
ABSOLUTE EOSINOPHIL (OHS): 0.04 K/UL
ABSOLUTE MONOCYTE (OHS): 0.32 K/UL (ref 0.3–1)
ABSOLUTE NEUTROPHIL COUNT (OHS): 3.02 K/UL (ref 1.8–7.7)
ALBUMIN SERPL BCP-MCNC: 3.8 G/DL (ref 3.5–5.2)
ALP SERPL-CCNC: 82 UNIT/L (ref 40–150)
ALT SERPL W/O P-5'-P-CCNC: 30 UNIT/L (ref 10–44)
ANION GAP (OHS): 12 MMOL/L (ref 8–16)
AST SERPL-CCNC: 20 UNIT/L (ref 11–45)
BASOPHILS # BLD AUTO: 0.04 K/UL
BASOPHILS NFR BLD AUTO: 0.9 %
BILIRUB SERPL-MCNC: 0.5 MG/DL (ref 0.1–1)
BUN SERPL-MCNC: 13 MG/DL (ref 8–23)
CALCIUM SERPL-MCNC: 8.9 MG/DL (ref 8.7–10.5)
CHLORIDE SERPL-SCNC: 98 MMOL/L (ref 95–110)
CO2 SERPL-SCNC: 27 MMOL/L (ref 23–29)
CREAT SERPL-MCNC: 0.9 MG/DL (ref 0.5–1.4)
ERYTHROCYTE [DISTWIDTH] IN BLOOD BY AUTOMATED COUNT: 13.2 % (ref 11.5–14.5)
GFR SERPLBLD CREATININE-BSD FMLA CKD-EPI: >60 ML/MIN/1.73/M2
GLUCOSE SERPL-MCNC: 218 MG/DL (ref 70–110)
HCT VFR BLD AUTO: 45.4 % (ref 37–48.5)
HGB BLD-MCNC: 14.7 GM/DL (ref 12–16)
IMM GRANULOCYTES # BLD AUTO: 0.01 K/UL (ref 0–0.04)
IMM GRANULOCYTES NFR BLD AUTO: 0.2 % (ref 0–0.5)
LYMPHOCYTES # BLD AUTO: 1 K/UL (ref 1–4.8)
MCH RBC QN AUTO: 31 PG (ref 27–31)
MCHC RBC AUTO-ENTMCNC: 32.4 G/DL (ref 32–36)
MCV RBC AUTO: 96 FL (ref 82–98)
NUCLEATED RBC (/100WBC) (OHS): 0 /100 WBC
PLATELET # BLD AUTO: 235 K/UL (ref 150–450)
PMV BLD AUTO: 12.3 FL (ref 9.2–12.9)
POTASSIUM SERPL-SCNC: 4.1 MMOL/L (ref 3.5–5.1)
PROT SERPL-MCNC: 6.6 GM/DL (ref 6–8.4)
RBC # BLD AUTO: 4.74 M/UL (ref 4–5.4)
RELATIVE EOSINOPHIL (OHS): 0.9 %
RELATIVE LYMPHOCYTE (OHS): 22.6 % (ref 18–48)
RELATIVE MONOCYTE (OHS): 7.2 % (ref 4–15)
RELATIVE NEUTROPHIL (OHS): 68.2 % (ref 38–73)
SODIUM SERPL-SCNC: 137 MMOL/L (ref 136–145)
WBC # BLD AUTO: 4.43 K/UL (ref 3.9–12.7)

## 2025-04-10 PROCEDURE — 36415 COLL VENOUS BLD VENIPUNCTURE: CPT

## 2025-04-10 PROCEDURE — 80053 COMPREHEN METABOLIC PANEL: CPT

## 2025-04-10 PROCEDURE — 85025 COMPLETE CBC W/AUTO DIFF WBC: CPT

## 2025-05-08 ENCOUNTER — LAB VISIT (OUTPATIENT)
Dept: LAB | Facility: HOSPITAL | Age: 80
End: 2025-05-08
Attending: STUDENT IN AN ORGANIZED HEALTH CARE EDUCATION/TRAINING PROGRAM
Payer: MEDICARE

## 2025-05-08 DIAGNOSIS — A31.0 MAI (MYCOBACTERIUM AVIUM-INTRACELLULARE): ICD-10-CM

## 2025-05-08 LAB
ABSOLUTE EOSINOPHIL (OHS): 0.05 K/UL
ABSOLUTE MONOCYTE (OHS): 0.26 K/UL (ref 0.3–1)
ABSOLUTE NEUTROPHIL COUNT (OHS): 2.35 K/UL (ref 1.8–7.7)
ALBUMIN SERPL BCP-MCNC: 3.9 G/DL (ref 3.5–5.2)
ALP SERPL-CCNC: 75 UNIT/L (ref 40–150)
ALT SERPL W/O P-5'-P-CCNC: 24 UNIT/L (ref 10–44)
ANION GAP (OHS): 8 MMOL/L (ref 8–16)
AST SERPL-CCNC: 19 UNIT/L (ref 11–45)
BASOPHILS # BLD AUTO: 0.03 K/UL
BASOPHILS NFR BLD AUTO: 0.8 %
BILIRUB SERPL-MCNC: 0.4 MG/DL (ref 0.1–1)
BUN SERPL-MCNC: 14 MG/DL (ref 8–23)
CALCIUM SERPL-MCNC: 9 MG/DL (ref 8.7–10.5)
CHLORIDE SERPL-SCNC: 99 MMOL/L (ref 95–110)
CO2 SERPL-SCNC: 29 MMOL/L (ref 23–29)
CREAT SERPL-MCNC: 1 MG/DL (ref 0.5–1.4)
ERYTHROCYTE [DISTWIDTH] IN BLOOD BY AUTOMATED COUNT: 12.9 % (ref 11.5–14.5)
GFR SERPLBLD CREATININE-BSD FMLA CKD-EPI: 58 ML/MIN/1.73/M2
GLUCOSE SERPL-MCNC: 256 MG/DL (ref 70–110)
HCT VFR BLD AUTO: 45.7 % (ref 37–48.5)
HGB BLD-MCNC: 15 GM/DL (ref 12–16)
IMM GRANULOCYTES # BLD AUTO: 0.01 K/UL (ref 0–0.04)
IMM GRANULOCYTES NFR BLD AUTO: 0.3 % (ref 0–0.5)
LYMPHOCYTES # BLD AUTO: 0.85 K/UL (ref 1–4.8)
MCH RBC QN AUTO: 31.4 PG (ref 27–31)
MCHC RBC AUTO-ENTMCNC: 32.8 G/DL (ref 32–36)
MCV RBC AUTO: 96 FL (ref 82–98)
NUCLEATED RBC (/100WBC) (OHS): 0 /100 WBC
PLATELET # BLD AUTO: 188 K/UL (ref 150–450)
PMV BLD AUTO: 12.7 FL (ref 9.2–12.9)
POTASSIUM SERPL-SCNC: 4 MMOL/L (ref 3.5–5.1)
PROT SERPL-MCNC: 6.5 GM/DL (ref 6–8.4)
RBC # BLD AUTO: 4.77 M/UL (ref 4–5.4)
RELATIVE EOSINOPHIL (OHS): 1.4 %
RELATIVE LYMPHOCYTE (OHS): 23.9 % (ref 18–48)
RELATIVE MONOCYTE (OHS): 7.3 % (ref 4–15)
RELATIVE NEUTROPHIL (OHS): 66.3 % (ref 38–73)
SODIUM SERPL-SCNC: 136 MMOL/L (ref 136–145)
WBC # BLD AUTO: 3.55 K/UL (ref 3.9–12.7)

## 2025-05-08 PROCEDURE — 36415 COLL VENOUS BLD VENIPUNCTURE: CPT

## 2025-05-08 PROCEDURE — 85025 COMPLETE CBC W/AUTO DIFF WBC: CPT

## 2025-05-08 PROCEDURE — 82040 ASSAY OF SERUM ALBUMIN: CPT

## 2025-05-28 ENCOUNTER — OFFICE VISIT (OUTPATIENT)
Dept: INFECTIOUS DISEASES | Facility: CLINIC | Age: 80
End: 2025-05-28
Payer: MEDICARE

## 2025-05-28 VITALS
HEIGHT: 68 IN | WEIGHT: 110.44 LBS | DIASTOLIC BLOOD PRESSURE: 79 MMHG | TEMPERATURE: 97 F | SYSTOLIC BLOOD PRESSURE: 133 MMHG | OXYGEN SATURATION: 94 % | BODY MASS INDEX: 16.74 KG/M2 | HEART RATE: 85 BPM

## 2025-05-28 DIAGNOSIS — I10 PRIMARY HYPERTENSION: ICD-10-CM

## 2025-05-28 DIAGNOSIS — E78.49 OTHER HYPERLIPIDEMIA: ICD-10-CM

## 2025-05-28 DIAGNOSIS — E03.9 HYPOTHYROIDISM, UNSPECIFIED TYPE: ICD-10-CM

## 2025-05-28 DIAGNOSIS — A31.0 MAI (MYCOBACTERIUM AVIUM-INTRACELLULARE): Primary | ICD-10-CM

## 2025-05-28 DIAGNOSIS — E11.9 DIABETES MELLITUS TYPE 2, NONINSULIN DEPENDENT: ICD-10-CM

## 2025-05-28 DIAGNOSIS — J47.9 ADULT BRONCHIECTASIS: ICD-10-CM

## 2025-05-28 PROCEDURE — 99214 OFFICE O/P EST MOD 30 MIN: CPT | Mod: S$PBB,,, | Performed by: STUDENT IN AN ORGANIZED HEALTH CARE EDUCATION/TRAINING PROGRAM

## 2025-05-28 PROCEDURE — 99215 OFFICE O/P EST HI 40 MIN: CPT | Mod: PBBFAC | Performed by: STUDENT IN AN ORGANIZED HEALTH CARE EDUCATION/TRAINING PROGRAM

## 2025-05-28 PROCEDURE — 99999 PR PBB SHADOW E&M-EST. PATIENT-LVL V: CPT | Mod: PBBFAC,,, | Performed by: STUDENT IN AN ORGANIZED HEALTH CARE EDUCATION/TRAINING PROGRAM

## 2025-05-28 NOTE — PROGRESS NOTES
"Infectious Disease Clinic Note    Patient Name: Nehal Jimenez  YOB: 1945    PRESENTING HISTORY       History of Present Illness:  Ms. Nehal Jimenez is a 79 y.o. female w/ significant PMHx of DM, HTN, HLD, and hypothyroidism referred by pulmonology due to positive cultures collected from right lung on 11/6/24. Underwent bronchoscopy at the time. Findings: "Scanty secretions. Lavage performed collectively sent for microbiology. 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 CT chest with tree in bud nodular disease at the time. Had been seen for COVID last year and admitted. Seen by pulmonology for follow up and decision made to perform bronch. Current symptoms: daily cough; post nasal drip after exercise. Used to cough up a lot of mucus. Discussed with patient that Acremonium is a mold that can contaminate surfaces. More concerning if she were immunocompromised. Not likely to cause infection in this case. More commonly infects skin and nails. CT findings more likely associated with MAC. Explained complexity of MAC treatment. Ethambutol has been known to cause irreversible blindness in some cases so patient needs to see eye specialist weekly to monthly. Rifampin/rifabutin with potential to raise LFT and cause GERD/GI symptoms. Azithromycin well tolerated. Due to mild disease would plan to use 3x weekly dosing for 12 months from negative sputum cultures. 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 Sees Dr Sofia with [facility]. Patient would like to discuss risks of treatment with him before deciding to pursue treatment. Also not able to produce sputum at this time. Will need to ask pulmonology for clearance regimen. Patient and  most concerned about weight loss.      2/7/25: Patient reported willingness to begin MAC therapy. Antibiotics sent to pharmacy.      2/24: Follow up. " Patient has been on thrice weekly MAC regimen for about two weeks. Tolerating. Reiterated need to see eye doctor regularly to ensure no evidence of optic neuritis. Will need sputum and blood work every 4 weeks to ensure normal CBC/LFT and sputum negative for bacteria. CT chest scheduled in July. Goal is unchanged or improving findings.     5/28: Follow up. Patient tolerating 3 drug regimen aside from acid reflux that seems to occur once she completes breakfast after taking ethambutol. Explained there are various ways to take antibiotics. Bigger concern is combining rifampin with Synthroid as Synthroid levels can be reduced. Advised she take Synthroid with ethambutol and azithromycin first thing in the morning then take rifampin at night. Encouraged to reach out if stomach upset or acid reflux continue to worsen. Also recommend she cut out all acid foods from diet. Discussed that last AFB from February finalized no growth. Needs new orders as lab system changed. Last CBC and CMP unremarkable for drug toxicity. Seeing eye doctor every 3 months. They are aware patient is on ethambutol. She currently denies vision changes. Has been unable to produce sputum. Advised her that on days she has blood work scheduled if she can somehow produce sputum to bring a sample. If unable then would not worry about it. Patient voiced understanding. Willing to complete 12 months of therapy. Tentative stop date February 2026.          EXAMINATION:  CT CHEST WITHOUT CONTRAST     CLINICAL HISTORY:  Bronchiectasis, uncomplicatedRML atelectasis;     TECHNIQUE:  Axial CT images performed through the chest without intravenous contrast.     COMPARISON:  08/04/2024     FINDINGS:  Progression of the tree-in-bud micronodular opacities in the upper lobes.  The micro nodular opacities with bronchiectasis and right middle lobe atelectasis is otherwise stable in the lower lobes, lingula, and right middle lobe.  No effusions.  No thoracic adenopathy.      The upper abdominal visualized organs are within normal limits.     The bones are intact.     Impression:     Progression of the tree-in-bud micronodular opacities specifically in the upper lobes characteristic of an indolent infectious bronchiolitis such as DORI.     All CT scans at this facility are performed  using dose modulation techniques as appropriate to performed exam including the following:  automated exposure control; adjustment of mA and/or kV according to the patients size (this includes techniques or standardized protocols for targeted exams where dose is matched to indication/reason for exam: i.e. extremities or head);  iterative reconstruction technique.        Electronically signed by:Amadeo Avila MD  Date:                                            10/28/2024  Time:                                           10:53                Exam Ended: 10/28/24 10:27 CDT Last Resulted: 10/28/24 10:53 CDT            Procedure Component Value Units Date/Time   Culture, Respiratory with Gram Stain [[id number]] Collected: 11/06/24 0828   Order Status: Canceled Specimen: Respiratory from Lung, RML     AFB Culture & Smear [[id number]] (Abnormal) Collected: 11/06/24 0828   Order Status: Completed Specimen: Respiratory from Lung, RML Updated: 12/11/24 1245     AFB Culture & Smear Culture positive, identification pending       Results acknowledged via secure chat by Dr. Brian Aldrich       11/20/2024  11:41       MYCOBACTERIUM SPECIES  Identification and susceptibility pending   Abnormal      AFB CULTURE STAIN No acid fast bacilli seen.   Fungus culture [[id number]] (Abnormal) Collected: 11/06/24 0828   Order Status: Completed Specimen: Respiratory from Lung, RML Updated: 12/16/24 0529     Fungus (Mycology) Culture ACREMONIUM SPECIES  Rare   Abnormal    KOH prep [[id number]] Collected: 11/06/24 0828   Order Status: Completed Specimen: Respiratory from Lung, RML Updated: 11/07/24 0133     KOH Prep No yeast or fungal  elements seen   Culture Referred ID, Mycobacteria and Nocardia [[id number]] (Abnormal) Collected: 11/06/24 0828   Order Status: Completed Updated: 12/30/24 1410     Specimen and source - Mycobacterium RESP, Lung, RML     Mycobacteria culture ID + Susc, referred FINAL 12/30/2024 1408 Abnormal      Comment: SOURCE: LUNG, lung RML  MYCOBACTERIA CULTURE REFER ID+SUSC                     FINAL  MYCOBACTERIUM AVIUM COMPLEX    -  Per CLSI M24 Guidelines, LIZY data for ethambutol,  rifampin,and rifabutin have shown poor correlation  with clinical response. Therefore, although these  drugs are in the recommended treatment regimen,  breakpoints for these agents that separate  susceptible from resistant strains cannot be  determined and these drugs will not be reported.  A high or a low LIZY value provided by a laboratory  should not be used to determine whether these  drugs are useful in treatment regimens and we will  not test forward to other institutions for testing  of these agents because it is contrary to current  guidance-based therapy recommendations.  There are no established interpretive guidelines for agents  reported without interpretations.  Previous comment was modified at 15:03 on 12/16/2024:  Negative for M. tuberculosis complex to date  by PCR.  ----------------------------------------------------------  Organism     MYCOBACTERIUM AVIUM COMPLEX  Antibiotic                   LIZY (mcg/mL)  Interpretation  ----------------------------------------------------------  Clofazimine                           0.5  Moxifloxacin                           >4       R  Clarithromycin                          2       S  Amikacin (IV)                          64       R  Amikacin (liposomal, inhaled)          64       S  Linezolid                              16       I  Moxifloxacin: The in vivo effectiveness of this agent for  MAC disease is unproven.  Clarithromycin: Clarithromycin is the class drug for  macrolides and the  only macrolide that needs to be tested.  Linezolid: The in vivo effectiveness of this agent for MAC  disease is unproven.  ------------------------------------------------------------  S=SUSCEPTIBLE  I=INTERMEDIATE  R=RESISTANT  NS=NONSUSCEPTIBLE  SDD=SUSCEPTIBLE DOSE DEPENDENT  ------------------------------------------------------------    Test Performed by:  [place] [address]  : Uziel Vick Ph.D.; CLIA# [id number]      Culture, Respiratory with Gram Stain [[id number]] Collected: 11/06/24 0826   Order Status: Completed Specimen: Bronchial Wash Updated: 11/09/24 1123     Respiratory Culture No growth     Gram Stain (Respiratory) <10 epithelial cells per low power field.     Gram Stain (Respiratory) Rare WBC's     Gram Stain (Respiratory) No organisms seen   AFB Culture & Smear [[id number]] Collected: 11/06/24 0826   Order Status: Completed Specimen: Bronchial Wash Updated: 12/04/24 1218     AFB Culture & Smear Culture positive, identification pending       Verbal report given for previous positive culture     AFB CULTURE STAIN No acid fast bacilli seen.   Fungus culture [[id number]] Collected: 11/06/24 0826   Order Status: Completed Specimen: Bronchial Wash Updated: 12/09/24 1001     Fungus (Mycology) Culture No fungus isolated after 4 weeks   KOH prep [[id number]] Collected: 11/06/24 0826   Order Status: Completed Specimen: Bronchial Wash Updated: 11/07/24 0148     KOH Prep No yeast or fungal elements seen   Culture Referred for Identification, Mycobacterium [[id number]] (Abnormal) Collected: 11/06/24 0826   Order Status: Completed Updated: 12/23/24 1417     Culture referred for ID, Mycobacterium FINAL 12/23/2024 1415 Abnormal      Comment: SOURCE: BRONCHIAL WASHING, Bronchial Wash  CULTURE REFER FOR ID, MYCOBACTERIUM                    FINAL  MYCOBACTERIUM AVIUM COMPLEX    -  Previous comment was modified at 14:15 on  12/23/2024:  Negative for M. tuberculosis complex to date  by PCR.    Test Performed by:  [facility] [address]  : Uziel Vick Ph.D.; CLIA# [id number]             Review of Systems:  Constitutional: no fever or chills  Eyes: no visual changes  ENT: no nasal congestion or sore throat  Respiratory: no cough or shortness of breath  Cardiovascular: no chest pain  Gastrointestinal: no nausea or vomiting, no abdominal pain, no constipation, no diarrhea; +GERD and weight loss   Genitourinary: no hematuria or dysuria  Musculoskeletal: no arthralgias or myalgias  Skin: no rash  Neurological: no headaches, numbness, or paresthesias    The following portions of the patient's history were reviewed and updated as appropriate: allergies, current medications, past family history, past medical history, past social history, past surgical history, and problem list.    PAST HISTORY:     Immunization History   Administered Date(s) Administered    COVID-19, MRNA, LN-S, PF (Pfizer) (Gray Cap) 07/06/2022    COVID-19, MRNA, LN-S, PF (Pfizer) (Purple Cap) 01/13/2021, 02/03/2021, 10/04/2021    COVID-19, mRNA, LNP-S, PF, daniella-sucrose, 30 mcg/0.3 mL (Pfizer Ages 12+) 09/28/2024       Past Medical History:   Diagnosis Date    Diabetes mellitus     Hypercholesteremia     Hypertension     Hypothyroidism     Osteopenia     Endocrinologist treating    Pneumonia        Past Surgical History:   Procedure Laterality Date    BREAST BIOPSY      BREAST CYST ASPIRATION      BRONCHOSCOPY Bilateral 11/6/2024    Procedure: Bronchoscopy - insert lighted tube into airway to take a biopsy of lung;  Surgeon: Brian Aldrich MD;  [facility];  Service: Pulmonary;  Laterality: Bilateral;    COLONOSCOPY  2016    [facility]    ENDOSCOPY         Family History   Problem Relation Name Age of Onset    Colon cancer Father  86    Hodgkin's lymphoma Father         Social  History     Socioeconomic History    Marital status:    Tobacco Use    Smoking status: Never    Smokeless tobacco: Never   Substance and Sexual Activity    Alcohol use: Never    Drug use: Never    Sexual activity: Not Currently     Partners: Male     Birth control/protection: None     Social Drivers of Health     Financial Resource Strain: Low Risk  (8/25/2024)    Overall Financial Resource Strain (CARDIA)     Difficulty of Paying Living Expenses: Not hard at all   Food Insecurity: No Food Insecurity (8/25/2024)    Hunger Vital Sign     Worried About Running Out of Food in the Last Year: Never true     Ran Out of Food in the Last Year: Never true   Transportation Needs: No Transportation Needs (8/5/2024)    TRANSPORTATION NEEDS     Transportation : No   Physical Activity: Sufficiently Active (8/25/2024)    Exercise Vital Sign     Days of Exercise per Week: 3 days     Minutes of Exercise per Session: 60 min   Stress: No Stress Concern Present (8/25/2024)    [employer] - Occupational Stress Questionnaire     Feeling of Stress : Only a little   Housing Stability: Unknown (8/25/2024)    Housing Stability Vital Sign     Unable to Pay for Housing in the Last Year: No       MEDICATIONS & ALLERGIES:     Current Outpatient Medications on File Prior to Visit   Medication Sig    azithromycin (ZITHROMAX) 500 MG tablet Take 1 tablet (500 mg total) by mouth 3 (three) times a week.    calcium citrate-vitamin D3 315-200 mg (CITRACAL+D) 315 mg-5 mcg (200 unit) per tablet Take 1 tablet by mouth 2 (two) times daily.    denosumab (PROLIA) 60 mg/mL Syrg Inject 60 mg into the skin. Patient takes every 6 months. Next due on 11/14/2024.    ethambutoL (MYAMBUTOL) 400 MG Tab Take 3 tablets (1,200 mg total) by mouth 3 (three) times a week.    lancets 33 gauge Misc USE EVERY DAY    levothyroxine (SYNTHROID) 125 MCG tablet Take by mouth.    losartan (COZAAR) 25 MG tablet losartan 25 mg tablet   TAKE 1 TABLET  "BY MOUTH DAILY    metFORMIN (GLUCOPHAGE-XR) 500 MG ER 24hr tablet Take 500 mg by mouth once daily.    mucus clearing device (ACAPELLA, FLUTTER) by Misc.(Non-Drug; Combo Route) route every 12 (twelve) hours.    mucus clearing device (AEROBIKA OSCILLATING PEP SYSTM) by Misc.(Non-Drug; Combo Route) route every 12 (twelve) hours.    ONETOUCH VERIO TEST STRIPS Strp SMARTSIG:Via Meter Daily    pravastatin (PRAVACHOL) 40 MG tablet Take 1 tablet by mouth once daily.    prednisoLONE acetate (PRED FORTE) 1 % DrpS Place 1 drop into both eyes.    pulse oximeter (PULSE OXIMETER) device by Apply Externally route 2 (two) times a day. Use twice daily at 8 AM and 3 PM and record the value in [employer] as directed.    rifAMpin (RIFADIN) 300 MG capsule Take 2 capsules (600 mg total) by mouth 3 (three) times a week.    sodium chloride 7% 7 % nebulizer solution Take 4 mLs by nebulization every 12 (twelve) hours.    solifenacin (VESICARE) 10 MG tablet Take 1 tablet by mouth every morning.    triamcinolone acetonide 0.1% (KENALOG) 0.1 % cream Apply to the affected area twice daily.  Do not apply to face, underarms, or groin.     No current facility-administered medications on file prior to visit.       Review of patient's allergies indicates:   Allergen Reactions    Paxlovid [nirmatrelvir-ritonavir] Swelling    Adhesive tape-silicones     Latex, natural rubber     Albuterol Anxiety, Other (See Comments) and Palpitations       OBJECTIVE:   Vital Signs:  Vitals:    05/28/25 1003   BP: 133/79   Pulse: 85   Temp: 97.3 °F (36.3 °C)   TempSrc: Oral   SpO2: (!) 94%   Weight: 50.1 kg (110 lb 7.2 oz)   Height: 5' 8" (1.727 m)       No results found for this or any previous visit (from the past 24 hours).      Physical Exam:   General:  Well developed, well nourished, no acute distress  HEENT:  Normocephalic, atraumatic  CVS:  RRR, S1 and S2 normal, no murmurs, rubs, gallops  Resp:  Lungs clear to auscultation, no wheezes, rales, rhonchi  GI:  " Abdomen soft, non-tender, non-distended, normoactive bowel sounds, no masses  MSK:  No muscle atrophy, peripheral edema, full range of motion  Skin:  No rashes, ulcers, erythema  Psych:  Alert and oriented to person, place, and time    ASSESSMENT:     Pulmonary MAC  Using as guide 2020-Treatment of Nontuberculous Mycobacterial Pulmonary Disease: An Official ATS/ERS/ESCMID/IDSA Clinical Practice Guideline: Executive Summary -  Therapy needs combination of clinical, microbiologic and radiologic therapy .  Clinical and radiologic criteria (all required) 1. Pulmonary or systemic symptoms and 2. Nodular or cavitary opacities on chest radiograph or bronchiectasis with multiple small nodules on high-resolution computed tomography and 3. Appropriate exclusion of other diagnoses        Clinical and microbiological criteria for diagnosing non-tuberculous mycobacterial lung disease (wendy et al )    Clinical ( Both required)   1.  Pulmonary symptoms, nodular or cavitary opacities on chest radiograph, or a high-resolution CT scan that shows multifocal bronchiectasis with multiple small nodules.    2.  Appropriate exclusion of other diagnoses.   Microbiological ( One required)   1. Positive culture results from at least two separate expectorated sputum samples; if the results are nondiagnostic,consider repeat sputum AFB smears and cultures.   2. Positive culture results from at least one bronchial wash or Lavage   3. Transbronchial or other lung biopsy with mycobacterial histopathological features (granulomatous inflammation or AFB) and positive culture for NTM or biopsy showing mycobacterial histopathological features (granulomatous inflammation or AFB) and one or more sputum or bronchial washings that are culture-positive for NTM.      --AFB + 11/6 from BAL   --Nodular disease noted on recent CT imaging   --Would consider this mild disease   --At this time patient not reporting classic symptoms; difficult to cough up sputum  for surveillance which would make treatment challenging   --Otherwise meets criteria for thrice weekly treatment   --Patient opted to begin treatment; antibiotics sent to [facility] on file Feb 7th   --Continue PO azithromycin 500 mg 3x weekly, rifampin 600 mg 3x weekly, and ethambutol 25 mg/kg (1200 mg) 3x weekly   --Discussed possible side effects including vision changes/blindness from ethambutol, elevated LFT from rifampin, and prolonged QT from azithromycin (less likely as patient not on other QT prolonging agents)   --Advised patient to reach out if GERD/weight loss persists; if so will switch rifampin to rifabutin   --Monthly CBC and CMP while on treatment   --Patient will need monthly vision testing while on ethambutol; eye doctor has opted for every 3 month follow up   --Plan to collect AFB sputum culture every 4 weeks when able to produce a sample   --Clinical expectations: symptomatic and radiographic improvement within 2-6 months and conversion of sputum culture from positive to negative within approximately 6 months   --Will plan to treat for at least 12 months after sputum culture becomes negative for MAC   --Recommend adjunctive bronchial hygiene with 7% nebulizer and Aerobika via pulmonology especially since patient struggling to produce sputum   --Will check CT chest in July   --Follow up with me in July to discuss results      DM  Continue current medications and follow up with PCP       HTN  Continue current medications and follow up with PCP       HLD  Continue current medications and follow up with PCP     Hypothyroidism   Continue current medications and follow up with PCP      PLAN:     Nehal was seen today for follow-up.    Diagnoses and all orders for this visit:    DORI (mycobacterium avium-intracellulare)  -     CBC Auto Differential; Standing  -     Comprehensive Metabolic Panel; Standing  -     AFB Culture & Smear; Standing    Adult bronchiectasis    Primary hypertension    Other  hyperlipidemia    Hypothyroidism, unspecified type    Diabetes mellitus type 2, noninsulin dependent          The total time for evaluation and management services performed on 5/28/25 was greater than 45 minutes.        Anand Jewell, DO   Infectious Diseases

## 2025-06-09 ENCOUNTER — LAB VISIT (OUTPATIENT)
Dept: LAB | Facility: HOSPITAL | Age: 80
End: 2025-06-09
Attending: STUDENT IN AN ORGANIZED HEALTH CARE EDUCATION/TRAINING PROGRAM
Payer: MEDICARE

## 2025-06-09 DIAGNOSIS — A31.0 MAI (MYCOBACTERIUM AVIUM-INTRACELLULARE): ICD-10-CM

## 2025-06-09 LAB
ABSOLUTE EOSINOPHIL (OHS): 0.06 K/UL
ABSOLUTE MONOCYTE (OHS): 0.31 K/UL (ref 0.3–1)
ABSOLUTE NEUTROPHIL COUNT (OHS): 2.85 K/UL (ref 1.8–7.7)
ALBUMIN SERPL BCP-MCNC: 4 G/DL (ref 3.5–5.2)
ALP SERPL-CCNC: 60 UNIT/L (ref 40–150)
ALT SERPL W/O P-5'-P-CCNC: 34 UNIT/L (ref 10–44)
ANION GAP (OHS): 9 MMOL/L (ref 8–16)
AST SERPL-CCNC: 28 UNIT/L (ref 11–45)
BASOPHILS # BLD AUTO: 0.03 K/UL
BASOPHILS NFR BLD AUTO: 0.7 %
BILIRUB SERPL-MCNC: 0.5 MG/DL (ref 0.1–1)
BUN SERPL-MCNC: 16 MG/DL (ref 8–23)
CALCIUM SERPL-MCNC: 8.6 MG/DL (ref 8.7–10.5)
CHLORIDE SERPL-SCNC: 98 MMOL/L (ref 95–110)
CO2 SERPL-SCNC: 25 MMOL/L (ref 23–29)
CREAT SERPL-MCNC: 0.8 MG/DL (ref 0.5–1.4)
ERYTHROCYTE [DISTWIDTH] IN BLOOD BY AUTOMATED COUNT: 12.5 % (ref 11.5–14.5)
GFR SERPLBLD CREATININE-BSD FMLA CKD-EPI: >60 ML/MIN/1.73/M2
GLUCOSE SERPL-MCNC: 241 MG/DL (ref 70–110)
HCT VFR BLD AUTO: 42.9 % (ref 37–48.5)
HGB BLD-MCNC: 14 GM/DL (ref 12–16)
IMM GRANULOCYTES # BLD AUTO: 0.02 K/UL (ref 0–0.04)
IMM GRANULOCYTES NFR BLD AUTO: 0.5 % (ref 0–0.5)
LYMPHOCYTES # BLD AUTO: 1.09 K/UL (ref 1–4.8)
MCH RBC QN AUTO: 31.5 PG (ref 27–31)
MCHC RBC AUTO-ENTMCNC: 32.6 G/DL (ref 32–36)
MCV RBC AUTO: 97 FL (ref 82–98)
NUCLEATED RBC (/100WBC) (OHS): 0 /100 WBC
PLATELET # BLD AUTO: 204 K/UL (ref 150–450)
PMV BLD AUTO: 12.3 FL (ref 9.2–12.9)
POTASSIUM SERPL-SCNC: 4 MMOL/L (ref 3.5–5.1)
PROT SERPL-MCNC: 6.4 GM/DL (ref 6–8.4)
RBC # BLD AUTO: 4.44 M/UL (ref 4–5.4)
RELATIVE EOSINOPHIL (OHS): 1.4 %
RELATIVE LYMPHOCYTE (OHS): 25 % (ref 18–48)
RELATIVE MONOCYTE (OHS): 7.1 % (ref 4–15)
RELATIVE NEUTROPHIL (OHS): 65.3 % (ref 38–73)
SODIUM SERPL-SCNC: 132 MMOL/L (ref 136–145)
WBC # BLD AUTO: 4.36 K/UL (ref 3.9–12.7)

## 2025-06-09 PROCEDURE — 36415 COLL VENOUS BLD VENIPUNCTURE: CPT | Mod: PO

## 2025-06-09 PROCEDURE — 80053 COMPREHEN METABOLIC PANEL: CPT

## 2025-06-09 PROCEDURE — 85025 COMPLETE CBC W/AUTO DIFF WBC: CPT

## 2025-06-11 ENCOUNTER — TELEPHONE (OUTPATIENT)
Dept: INFECTIOUS DISEASES | Facility: CLINIC | Age: 80
End: 2025-06-11
Payer: MEDICARE

## 2025-06-11 NOTE — TELEPHONE ENCOUNTER
----- Message from [employer] sent at 6/11/2025  9:13 AM CDT -----  Type : Patient Call Who Called : Patient's  (Bran)Does the patient know what this is regarding?: Wanted to notify provider that the 3 antibiotics pt is currently on is now causing her to have neuropathy like symp. Pt is experiencing a tingling as well as a burning sensation underneath both of her feet. The feeling has recently started within the last couple of weeks. Would the patient rather a call back or a response via [facility]? [facility] Call Back Number: [phone] Information:

## 2025-06-11 NOTE — TELEPHONE ENCOUNTER
Returned call to patient's  who think's that patient is experiencing numbness and tinging in her feet since starting on antibiotics. Please advise

## 2025-06-12 ENCOUNTER — TELEPHONE (OUTPATIENT)
Dept: INFECTIOUS DISEASES | Facility: CLINIC | Age: 80
End: 2025-06-12
Payer: MEDICARE

## 2025-06-12 NOTE — TELEPHONE ENCOUNTER
Called and left a message to inform that patient need to  B6. Let the department know if that doesn't work.

## 2025-06-12 NOTE — TELEPHONE ENCOUNTER
Copied from CRM #[id number]. Topic: General Inquiry - Return Call  >> Jun 12, 2025 12:40 PM Sarita wrote:  .Type:  Patient Returning Call    Who Called:Nehal  Who Left Message for Patient:nurse   Does the patient know what this is regarding?:call back   Would the patient rather a call back or a response via [facility]? Call   Best Call Back Number:.[phone]   Additional Information: pt requesting a call back

## 2025-06-13 ENCOUNTER — TELEPHONE (OUTPATIENT)
Dept: INFECTIOUS DISEASES | Facility: CLINIC | Age: 80
End: 2025-06-13
Payer: MEDICARE

## 2025-06-13 NOTE — TELEPHONE ENCOUNTER
Called and spoke with Bran. Informed that patient is to take B6 over the counter and let us know next week if symptoms are getting better for patient. He expressed understanding.

## 2025-06-17 ENCOUNTER — PATIENT MESSAGE (OUTPATIENT)
Dept: INFECTIOUS DISEASES | Facility: CLINIC | Age: 80
End: 2025-06-17
Payer: MEDICARE

## 2025-06-20 ENCOUNTER — OFFICE VISIT (OUTPATIENT)
Dept: URGENT CARE | Facility: CLINIC | Age: 80
End: 2025-06-20
Payer: MEDICARE

## 2025-06-20 ENCOUNTER — HOSPITAL ENCOUNTER (OUTPATIENT)
Dept: RADIOLOGY | Facility: CLINIC | Age: 80
Discharge: HOME | End: 2025-06-20
Attending: NURSE PRACTITIONER
Payer: MEDICARE

## 2025-06-20 ENCOUNTER — PATIENT MESSAGE (OUTPATIENT)
Dept: INFECTIOUS DISEASES | Facility: CLINIC | Age: 80
End: 2025-06-20
Payer: MEDICARE

## 2025-06-20 VITALS
DIASTOLIC BLOOD PRESSURE: 79 MMHG | HEART RATE: 75 BPM | RESPIRATION RATE: 18 BRPM | SYSTOLIC BLOOD PRESSURE: 162 MMHG | WEIGHT: 110 LBS | TEMPERATURE: 98 F | OXYGEN SATURATION: 95 % | HEIGHT: 68 IN | BODY MASS INDEX: 16.67 KG/M2

## 2025-06-20 VITALS
WEIGHT: 109.88 LBS | HEART RATE: 90 BPM | DIASTOLIC BLOOD PRESSURE: 73 MMHG | TEMPERATURE: 98 F | BODY MASS INDEX: 16.65 KG/M2 | HEIGHT: 68 IN | RESPIRATION RATE: 18 BRPM | SYSTOLIC BLOOD PRESSURE: 133 MMHG | OXYGEN SATURATION: 93 %

## 2025-06-20 DIAGNOSIS — R09.82 POST-NASAL DRIP: ICD-10-CM

## 2025-06-20 DIAGNOSIS — E11.69 TYPE 2 DIABETES MELLITUS WITH OTHER SPECIFIED COMPLICATION, WITHOUT LONG-TERM CURRENT USE OF INSULIN: ICD-10-CM

## 2025-06-20 DIAGNOSIS — H65.191 ACUTE EFFUSION OF RIGHT EAR: ICD-10-CM

## 2025-06-20 DIAGNOSIS — M25.471 RIGHT ANKLE SWELLING: ICD-10-CM

## 2025-06-20 DIAGNOSIS — M79.89 SWELLING OF RIGHT FOOT: ICD-10-CM
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
## 2025-06-20 DIAGNOSIS — R06.7 SNEEZING: ICD-10-CM

## 2025-06-20 DIAGNOSIS — R09.89 RUNNY NOSE: Primary | ICD-10-CM

## 2025-06-20 DIAGNOSIS — R09.89 RUNNY NOSE: ICD-10-CM

## 2025-06-20 DIAGNOSIS — M79.671 RIGHT FOOT PAIN: ICD-10-CM
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
## 2025-06-20 DIAGNOSIS — M25.571 ACUTE RIGHT ANKLE PAIN: ICD-10-CM

## 2025-06-20 PROCEDURE — 73610 X-RAY EXAM OF ANKLE: CPT | Mod: RT,S$GLB,, | Performed by: RADIOLOGY

## 2025-06-20 RX ORDER — RIFABUTIN 150 MG/1
300 CAPSULE ORAL
Qty: 72 CAPSULE | Refills: 3 | Status: SHIPPED | OUTPATIENT
Start: 2025-06-20 | End: 2026-06-20

## 2025-06-20 RX ORDER — LEVOCETIRIZINE DIHYDROCHLORIDE 5 MG/1
5 TABLET, FILM COATED ORAL NIGHTLY
Qty: 30 TABLET | Refills: 0 | Status: SHIPPED | OUTPATIENT
Start: 2025-06-20 | End: 2026-06-20

## 2025-06-20 NOTE — PATIENT INSTRUCTIONS
Maintain hydration/increase fluids  HOLD Nasonex  Initiate Xyzal as directed for post nasal drip  Schedule with Internal Medicine to establish care  Follow up as needed/with worsening

## 2025-06-20 NOTE — PROGRESS NOTES
"Subjective:      Patient ID: Nehal Jimenez is a 79 y.o. female.    Vitals:  height is 5' 8" (1.727 m) and weight is 49.9 kg (110 lb 0.2 oz). Her oral temperature is 97.8 °F (36.6 °C). Her blood pressure is 162/79 (abnormal) and her pulse is 75. Her respiration is 18 and oxygen saturation is 95%.     Chief Complaint: Fall    79 year old female presents for evaluation of right ankle and right foot pain (no pain at rest/moderate pain with weight bearing) and swelling post fall. States that her right leg fell asleep while she was sitting in her chair earlier today @ noon and when she got up her ankle rolled inwards and she fell down injuring her right ankle and foot. No medications taken for relief.    Fall  The accident occurred 1 to 3 hours ago. The fall occurred while standing. She fell from a height of 1 to 2 ft. She landed on Hard floor. There was no blood loss. The pain is present in the right foot. The pain is at a severity of 5/10. The pain is moderate. The symptoms are aggravated by flexion, ambulation, pressure on injury and standing. She has tried nothing for the symptoms. The treatment provided no relief.       Constitution: Negative.   HENT: Negative.     Neck: neck negative.   Cardiovascular: Negative.    Eyes: Negative.    Respiratory: Negative.     Gastrointestinal: Negative.    Endocrine: negative.   Genitourinary: Negative.    Musculoskeletal:  Positive for pain, trauma, joint pain (right ankle/lateral aspect and right foot/dorsal) and joint swelling (right ankle/lateral aspect and right foot/dorsal).   Skin: Negative.    Allergic/Immunologic: Negative.    Neurological: Negative.    Hematologic/Lymphatic: Negative.    Psychiatric/Behavioral: Negative.        Objective:     Physical Exam   Constitutional: She is oriented to person, place, and time. She is cooperative.  Non-toxic appearance. She does not appear ill. No distress. normalawake  HENT:   Head: Normocephalic and atraumatic.   Eyes: " Conjunctivae are normal. Pupils are equal, round, and reactive to light. Right eye exhibits no discharge. Left eye exhibits no discharge. No scleral icterus. Extraocular movement intact   Neck: Neck supple.   Cardiovascular: Normal rate.   Pulmonary/Chest: Effort normal.   Abdominal: Normal appearance.   Musculoskeletal:         General: Swelling, tenderness and signs of injury present.      Right ankle: She exhibits decreased range of motion and swelling. She exhibits no ecchymosis and no laceration. Tenderness. Lateral malleolus tenderness found.      Right foot: Decreased range of motion. Tenderness, bony tenderness and swelling present. No laceration.   Neurological: no focal deficit. She is alert and oriented to person, place, and time.   Skin: Skin is warm, dry and not diaphoretic. not right foot and not right ankle  Psychiatric: Her behavior is normal. Mood, judgment and thought content normal.   Nursing note and vitals reviewed.    X-Ray Foot Complete 3 view Right  Result Date: 6/20/2025  EXAM: XR FOOT COMPLETE 3 VIEW RIGHT CLINICAL HISTORY:  [W19.XXXA]-Unspecified fall, initial encounter./[M79.671]-Pain in right foot. Right foot x-ray, 3 views COMPARISON: None FINDINGS: Bones:  Well-healed fifth metatarsal fracture.  Remaining bones intact. Joint spaces:  Well-preserved. Soft tissues: Normal.     Negative for acute fracture or dislocation. Finalized on: 6/20/2025 7:38 PM By:  Franco Cassidy MD [facility]# [id number]      2025-06-20 19:40:[id number]     [facility]    X-Ray Ankle Complete 3 View Right  Result Date: 6/20/2025  EXAM:XR ANKLE COMPLETE 3 VIEW RIGHT CLINICAL HISTORY: Right ankle pain. Right ankle x-ray, 3 views COMPARISON: None FINDINGS: Bones:  No fracture/dislocation or osseous lesion. Joint spaces:  Well-preserved. Soft tissues: Normal.      Mild lateral soft tissue swelling otherwise negative right ankle x-ray. Finalized on: 6/20/2025 7:14 PM By:  Franco Cassidy MD [facility]# [id number]      2025-06-20  19:16:[id number]     [facility]      Assessment:     1. Fall, initial encounter    2. Right foot pain    3. Acute right ankle pain    4. Right ankle swelling    5. Swelling of right foot        Plan:       Fall, initial encounter  -     X-Ray Foot Complete 3 view Right; Future; Expected date: 06/20/2025  -     X-Ray Ankle Complete 3 View Right; Future; Expected date: 06/20/2025  -     Cancel: Ambulatory referral/consult to Orthopedics  -     Walking Boot For Home Use    Right foot pain  -     X-Ray Foot Complete 3 view Right; Future; Expected date: 06/20/2025    Acute right ankle pain  -     X-Ray Ankle Complete 3 View Right; Future; Expected date: 06/20/2025    Right ankle swelling    Swelling of right foot           Patient presents for evaluation of right foot/right ankle pain and swelling post injury. Decision to perform right foot and right ankle xray to rule out fracture. X-rays reviewed and discussed: (-) findings discussed. Plan is to manage pain, prevent worsening, and follow up as needed/with worsening. Discussed with patient who verbalizes understanding. Patient is stable for discharge.       Patient Instructions   Rest  Non-weight bearing  Apply ice to right foot/ankle  Immobilize right foot/ankle in boot  Elevate right foot/ankle  Alternate Tylenol and Ibuprofen OTC as directed for pain  Follow up as needed/with worsening

## 2025-06-20 NOTE — PATIENT INSTRUCTIONS
Rest  Non-weight bearing  Apply ice to right foot/ankle  Immobilize right foot/ankle in boot  Elevate right foot/ankle  Alternate Tylenol and Ibuprofen OTC as directed for pain  Follow up as needed/with worsening

## 2025-06-20 NOTE — PROGRESS NOTES
"Subjective:      Patient ID: Nehal Jimenez is a 79 y.o. female.    Vitals:  height is 5' 8" (1.727 m) and weight is 49.9 kg (109 lb 14.4 oz). Her temporal temperature is 98.2 °F (36.8 °C). Her blood pressure is 133/73 and her pulse is 90. Her respiration is 18 and oxygen saturation is 93% (abnormal).     Chief Complaint: Sinus Problem    79 year old female presents for evaluation of runny nose (clear) and post nasal drip greater than 1 month. States that symptoms are worse upon awakening. Inconsistent Claritin use without relief and daily Nasonex use. Past Flonase use caused blood sugars to increase. No pets.  reports participating in Aerobics 3 times a week in a [employer] which causes drip to worsen. 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 Desires to establish PCP care with [facility] provider    Sinus Problem  This is a new problem. The current episode started more than 1 month ago. The problem is unchanged. There has been no fever. Her pain is at a severity of 3/10. The pain is mild. Associated symptoms include congestion, sinus pressure and sneezing (occasional). Pertinent negatives include no chills, coughing, diaphoresis, ear pain, headaches (sneezing), hoarse voice, neck pain, shortness of breath, sore throat or swollen glands. Treatments tried: nasonex  clairitin. The treatment provided mild relief.       Constitution: Negative. Negative for appetite change, chills, sweating, fatigue and fever.   HENT:  Positive for hearing loss, congestion, postnasal drip and sinus pressure. Negative for ear pain and sore throat.    Neck: neck negative. Negative for neck pain.   Cardiovascular: Negative.    Eyes: Negative.    Respiratory: Negative.  Negative for cough and shortness of breath.    Gastrointestinal: Negative.    Endocrine: negative.   Genitourinary: Negative.    Musculoskeletal: Negative.    Skin: Negative.    Allergic/Immunologic: Positive for sneezing (occasional). "   Neurological: Negative.  Negative for headaches (sneezing).   Hematologic/Lymphatic: Negative.    Psychiatric/Behavioral: Negative.        Objective:     Physical Exam   Constitutional: She is oriented to person, place, and time.  Non-toxic appearance. She does not appear ill. No distress. normal  HENT:   Head: Normocephalic and atraumatic.   Ears:   Right Ear: No no drainage, swelling or tenderness. Tympanic membrane is not injected, not scarred, not perforated, not erythematous, not retracted and not bulging. A middle ear effusion is present. Decreased hearing is noted. no impacted cerumen  Left Ear: No no drainage, swelling or tenderness. Tympanic membrane is not injected, not scarred, not perforated, not erythematous, not retracted and not bulging.  No middle ear effusion. Decreased hearing is noted. no impacted cerumen  Nose: Mucosal edema (mild) present.   Mouth/Throat: Uvula is midline and mucous membranes are normal. Cobblestoning present. No oropharyngeal exudate, posterior oropharyngeal edema, posterior oropharyngeal erythema or tonsillar abscesses. Tonsils are 0 on the right. Tonsils are 0 on the left. No tonsillar exudate.   Eyes: Conjunctivae are normal. Pupils are equal, round, and reactive to light. Right eye exhibits no discharge. Left eye exhibits no discharge. No scleral icterus. Extraocular movement intact   Neck: Neck supple.   Cardiovascular: Normal rate, regular rhythm and normal heart sounds.   Pulmonary/Chest: Effort normal and breath sounds normal. No stridor. No respiratory distress. She has no wheezes. She has no rhonchi. She has no rales. She exhibits no tenderness.   Abdominal: Normal appearance.   Musculoskeletal: Normal range of motion.         General: Normal range of motion.   Neurological: no focal deficit. She is alert and oriented to person, place, and time.   Skin: Skin is warm, dry and not diaphoretic.   Psychiatric: Her behavior is normal. Mood, judgment and thought content  normal.   Nursing note and vitals reviewed.      Assessment:     1. Runny nose    2. Sneezing    3. Post-nasal drip    4. Acute effusion of right ear    5. Type 2 diabetes mellitus with other specified complication, without long-term current use of insulin        Plan:       Runny nose  -     levocetirizine (XYZAL) 5 MG tablet; Take 1 tablet (5 mg total) by mouth every evening.  Dispense: 30 tablet; Refill: 0    Sneezing  -     levocetirizine (XYZAL) 5 MG tablet; Take 1 tablet (5 mg total) by mouth every evening.  Dispense: 30 tablet; Refill: 0    Post-nasal drip  -     levocetirizine (XYZAL) 5 MG tablet; Take 1 tablet (5 mg total) by mouth every evening.  Dispense: 30 tablet; Refill: 0    Acute effusion of right ear    Type 2 diabetes mellitus with other specified complication, without long-term current use of insulin  -     Ambulatory referral/consult to Internal Medicine        Patient presents with symptoms and examination that are consistent with seasonal/environmental allergy exacerbation. Exam negative for otitis media/bacterial sinusitis/bacterial tonsillitis/bronchitis. [facility], referral placed to Internal Medicine. Plan is to manage symptoms, prevent worsening, and follow up as needed/with worsening. Discussed with patient who verbalizes understanding. Patient is stable for discharge.      Patient Instructions   Maintain hydration/increase fluids  HOLD Nasonex  Initiate Xyzal as directed for post nasal drip  Schedule with Internal Medicine to establish care  Follow up as needed/with worsening

## 2025-07-02 ENCOUNTER — OFFICE VISIT (OUTPATIENT)
Dept: INTERNAL MEDICINE | Facility: CLINIC | Age: 80
End: 2025-07-02
Payer: MEDICARE

## 2025-07-02 VITALS
BODY MASS INDEX: 17.21 KG/M2 | SYSTOLIC BLOOD PRESSURE: 126 MMHG | HEIGHT: 68 IN | WEIGHT: 113.56 LBS | DIASTOLIC BLOOD PRESSURE: 72 MMHG | HEART RATE: 89 BPM | TEMPERATURE: 97 F | OXYGEN SATURATION: 96 %

## 2025-07-02 DIAGNOSIS — E11.9 DIABETES MELLITUS TYPE 2, NONINSULIN DEPENDENT: Primary | ICD-10-CM

## 2025-07-02 DIAGNOSIS — H69.93 DYSFUNCTION OF BOTH EUSTACHIAN TUBES: ICD-10-CM

## 2025-07-02 DIAGNOSIS — S93.401D SPRAIN OF RIGHT ANKLE, UNSPECIFIED LIGAMENT, SUBSEQUENT ENCOUNTER: ICD-10-CM

## 2025-07-02 DIAGNOSIS — Z86.16 HISTORY OF COVID-19: ICD-10-CM

## 2025-07-02 DIAGNOSIS — E78.49 OTHER HYPERLIPIDEMIA: ICD-10-CM

## 2025-07-02 DIAGNOSIS — A31.0 MAI (MYCOBACTERIUM AVIUM-INTRACELLULARE): ICD-10-CM

## 2025-07-02 DIAGNOSIS — R05.3 PERSISTENT COUGH: ICD-10-CM

## 2025-07-02 DIAGNOSIS — Z87.01 HISTORY OF PNEUMONIA: ICD-10-CM

## 2025-07-02 DIAGNOSIS — E03.9 HYPOTHYROIDISM, UNSPECIFIED TYPE: ICD-10-CM

## 2025-07-02 PROCEDURE — 99215 OFFICE O/P EST HI 40 MIN: CPT | Mod: PBBFAC,PO | Performed by: FAMILY MEDICINE

## 2025-07-02 PROCEDURE — 99999 PR PBB SHADOW E&M-EST. PATIENT-LVL V: CPT | Mod: PBBFAC,,, | Performed by: FAMILY MEDICINE

## 2025-07-02 PROCEDURE — G2211 COMPLEX E/M VISIT ADD ON: HCPCS | Mod: ,,, | Performed by: FAMILY MEDICINE

## 2025-07-02 PROCEDURE — 99204 OFFICE O/P NEW MOD 45 MIN: CPT | Mod: S$PBB,,, | Performed by: FAMILY MEDICINE

## 2025-07-02 RX ORDER — BLOOD-GLUCOSE METER
EACH MISCELLANEOUS
COMMUNITY
Start: 2025-05-15

## 2025-07-02 RX ORDER — AZELASTINE 1 MG/ML
1 SPRAY, METERED NASAL 2 TIMES DAILY
Qty: 30 ML | Refills: 1 | Status: SHIPPED | OUTPATIENT
Start: 2025-07-02 | End: 2026-07-02

## 2025-07-02 NOTE — PATIENT INSTRUCTIONS
If the premier protein shakes aren't helping you to gain weight - you may want to try one of the meal replacement shakes such as ensure or boost.

## 2025-07-03 NOTE — PROGRESS NOTES
Subjective:      Patient ID: Nehal Jimenez is a 79 y.o. female.    Chief Complaint: Establish Care (Patient is here to establish care. Patient has a sprain foot; follow up. ), Cerumen Impaction (Patient thinks she has some fluid in ears; patient noticing voice goes out randomly. Patient had a nasal drip; is currently taking nasal medications to help dry this up. ), and Weight Gain (Patient would like to find things to do to increase her weight; she has been drinking [employer] beverages but do not seem to be helping increase patient weight. )      History of Present Illness    CHIEF COMPLAINT:  Ms. Jimenez presents today for follow up of ankle sprain    MUSCULOSKELETAL - ANKLE INJURY:  She sustained an ankle injury on the 20th when her leg fell asleep while sitting cross-legged, resulting in a fall. X-rays at urgent care the same day showed no fracture, and she was diagnosed with a severe ankle sprain. She was placed in a walking boot. She reports minimal pain but persistent swelling with black and blue discoloration. She has a history of previous broken toes.    RESPIRATORY - MYCOBACTERIUM INFECTION:  She has a persistent cough associated with mycobacterium lung infection, particularly during exercise and when lying down. Prior to starting treatment on February 14th, she experienced coughing during cool-down periods in aerobics class. She is currently on three antibiotics and has discontinued her regular exercise class. She denies shortness of breath.    ENT:  She reports ongoing ear pressure and congestion with fluid behind her ears. She takes Xyzal 1 tablet nightly and OTC Nasonex nasal spray for symptom management.    WEIGHT MANAGEMENT:  Her weight is 113 lbs, down from 119 lbs in August of last year. The weight loss is attributed to decreased appetite while on antibiotics. She is drinking Premier Protein shakes and reports slight weight gain since starting them.    MEDICAL HISTORY:  She has  "controlled diabetes with blood sugar under 140 mg/dL on one medication. She has a history of thyroid condition previously treated with medication and a history of corneal transplant currently followed by an eye specialist.        Past Medical History:   Diagnosis Date    Diabetes mellitus     Hypercholesteremia     Hypertension     Hypothyroidism     Osteopenia     Endocrinologist treating    Pneumonia           Past Surgical History:   Procedure Laterality Date    BREAST BIOPSY      BREAST CYST ASPIRATION      BRONCHOSCOPY Bilateral 11/6/2024    Procedure: Bronchoscopy - insert lighted tube into airway to take a biopsy of lung;  Surgeon: Brian Aldrich MD;  Location: [facility];  Service: Pulmonary;  Laterality: Bilateral;    COLONOSCOPY  2016    [facility]    ENDOSCOPY       Family History   Problem Relation Name Age of Onset    Colon cancer Father  86    Hodgkin's lymphoma Father       Social History[1]  Review of patient's allergies indicates:   Allergen Reactions    Paxlovid [nirmatrelvir-ritonavir] Swelling    Adhesive tape-silicones     Latex, natural rubber     Albuterol Anxiety, Other (See Comments) and Palpitations       Review of Systems   Constitutional:  Positive for malaise/fatigue. Negative for chills and fever.   HENT:  Positive for congestion.    Respiratory:  Negative for cough and shortness of breath.    Cardiovascular:  Negative for chest pain and palpitations.   Gastrointestinal:  Negative for abdominal pain and constipation.   Musculoskeletal:  Negative for myalgias.   Skin:  Negative for rash.   Psychiatric/Behavioral:  The patient is nervous/anxious (sometimes).      Objective:       /72 (BP Location: Right arm, Patient Position: Sitting)   Pulse 89   Temp 96.5 °F (35.8 °C) (Tympanic)   Ht 5' 8" (1.727 m)   Wt 51.5 kg (113 lb 8.6 oz)   SpO2 96%   BMI 17.26 kg/m²   Physical Exam    Vitals: Weight: 113 lbs.  Ears: Fluid behind eardrums.  MSK: Foot/Ankle - Left: Ankle swelling " present. Bruising present on ankle.        Physical Exam  Constitutional:       General: She is not in acute distress.     Appearance: Normal appearance. She is well-developed. She is not ill-appearing or diaphoretic.   HENT:      Right Ear: Ear canal and external ear normal. Tympanic membrane is bulging.      Left Ear: Ear canal and external ear normal. Tympanic membrane is bulging.      Mouth/Throat:      Pharynx: Oropharynx is clear. No posterior oropharyngeal erythema.   Cardiovascular:      Rate and Rhythm: Normal rate and regular rhythm.      Heart sounds: Normal heart sounds.   Pulmonary:      Effort: Pulmonary effort is normal.      Breath sounds: Normal breath sounds.   Abdominal:      Palpations: Abdomen is soft.      Tenderness: There is no abdominal tenderness.   Musculoskeletal:      Comments: Swelling and contusions noted on right foot-no tenderness to palpation, normal range of motion.   Neurological:      Mental Status: She is alert and oriented to person, place, and time.   Psychiatric:         Mood and Affect: Mood normal.         Behavior: Behavior normal.         Thought Content: Thought content normal.         Judgment: Judgment normal.         Assessment:     1. Diabetes mellitus type 2, noninsulin dependent    2. Other hyperlipidemia    3. Hypothyroidism, unspecified type    4. DORI (mycobacterium avium-intracellulare)    5. Sprain of right ankle, unspecified ligament, subsequent encounter    6. Dysfunction of both eustachian tubes    7. Persistent cough    8. History of COVID-19    9. History of pneumonia      Plan:   Assessment & Plan    MEDICAL DECISION MAKING:  - Assessed ankle sprain, noting improvement in swelling and bruising but still requiring continued use of boot for support.  - Evaluated ear pressure and congestion, determining presence of fluid behind eardrum without wax buildup.  - Reviewed weight loss concerns, likely related to increased caloric expenditure due to mycobacterium  lung infection.  - Examined chest XRs from previous year and today, noting apparent improvement and relatively mild presentation of lung condition.    PATIENT EDUCATION:  - Explained connection between nasal passages, sinuses, and ears using anatomical diagram to illustrate how nasal sprays can help relieve ear pressure.  - Discussed protein shakes and meal replacement options for weight gain, clarifying differences between protein-only shakes and those with added calories.  - Explained increased caloric needs due to lung condition, even when shortness of breath is not apparent.    ACTION ITEMS/LIFESTYLE:  - Ms. Jimenez to continue using protective boot for ankle as it heals.  - Recommend switching from Premier Protein shakes to meal replacement shakes like Ensure or Boost if weight gain is not achieved with current regimen.    MEDICATIONS:  - Started Astelin (azelastine) nasal spray to be used in combination with current Nasonex.  - Continued Nasonex nasal spray.  - Continued Xyzal (antihistamine) at night.        Diabetes mellitus type 2, noninsulin dependent    Other hyperlipidemia    Hypothyroidism, unspecified type    DORI (mycobacterium avium-intracellulare)    Sprain of right ankle, unspecified ligament, subsequent encounter    Dysfunction of both eustachian tubes    Persistent cough    History of COVID-19    History of pneumonia    Other orders  -     azelastine (ASTELIN) 137 mcg (0.1 %) nasal spray; 1 spray (137 mcg total) by Nasal route 2 (two) times daily.  Dispense: 30 mL; Refill: 1      Medication List with Changes/Refills   New Medications    AZELASTINE (ASTELIN) 137 MCG (0.1 %) NASAL SPRAY    1 spray (137 mcg total) by Nasal route 2 (two) times daily.   Current Medications    ACCU-CHEK GUIDE GLUCOSE METER MISC    Use to test blood sugar once daily    AZITHROMYCIN (ZITHROMAX) 500 MG TABLET    Take 1 tablet (500 mg total) by mouth 3 (three) times a week.    CALCIUM CITRATE-VITAMIN D3 315-200 MG  (CITRACAL+D) 315 MG-5 MCG (200 UNIT) PER TABLET    Take 1 tablet by mouth 2 (two) times daily.    DENOSUMAB (PROLIA) 60 MG/ML SYRG    Inject 60 mg into the skin. Patient takes every 6 months. Next due on 11/14/2024.    ETHAMBUTOL (MYAMBUTOL) 400 MG TAB    Take 3 tablets (1,200 mg total) by mouth 3 (three) times a week.    LANCETS 33 GAUGE MISC    USE EVERY DAY    LEVOCETIRIZINE (XYZAL) 5 MG TABLET    Take 1 tablet (5 mg total) by mouth every evening.    LEVOTHYROXINE (SYNTHROID) 125 MCG TABLET    Take by mouth.    LOSARTAN (COZAAR) 25 MG TABLET    losartan 25 mg tablet   TAKE 1 TABLET BY MOUTH DAILY    METFORMIN (GLUCOPHAGE-XR) 500 MG ER 24HR TABLET    Take 500 mg by mouth once daily.    MUCUS CLEARING DEVICE (ACAPELLA, FLUTTER)    by Misc.(Non-Drug; Combo Route) route every 12 (twelve) hours.    MUCUS CLEARING DEVICE (AEROBIKA OSCILLATING PEP SYSTM)    by Misc.(Non-Drug; Combo Route) route every 12 (twelve) hours.    ONETOUCH VERIO TEST STRIPS STRP    SMARTSIG:Via Meter Daily    PRAVASTATIN (PRAVACHOL) 40 MG TABLET    Take 1 tablet by mouth once daily.    PULSE OXIMETER (PULSE OXIMETER) DEVICE    by Apply Externally route 2 (two) times a day. Use twice daily at 8 AM and 3 PM and record the value in MyChart as directed.    RIFABUTIN (MYCOBUTIN) 150 MG CAP    Take 2 capsules (300 mg total) by mouth 3 (three) times a week.    SODIUM CHLORIDE 7% 7 % NEBULIZER SOLUTION    Take 4 mLs by nebulization every 12 (twelve) hours.    SOLIFENACIN (VESICARE) 10 MG TABLET    Take 1 tablet by mouth every morning.    TRIAMCINOLONE ACETONIDE 0.1% (KENALOG) 0.1 % CREAM    Apply to the affected area twice daily.  Do not apply to face, underarms, or groin.   Discontinued Medications    PREDNISOLONE ACETATE (PRED FORTE) 1 % DRPS    Place 1 drop into both eyes.       This note was generated with the assistance of ambient listening technology. Verbal consent was obtained by the patient and accompanying visitor(s) for the recording of  patient appointment to facilitate this note. I attest to having reviewed and edited the generated note for accuracy, though some syntax or spelling errors may persist. Please contact the author of this note for any clarification.            [1]   Social History  Socioeconomic History    Marital status:    Tobacco Use    Smoking status: Never    Smokeless tobacco: Never   Vaping Use    Vaping status: Never Used   Substance and Sexual Activity    Alcohol use: Never    Drug use: Never    Sexual activity: Not Currently     Partners: Male     Birth control/protection: None     Social Drivers of Health     Financial Resource Strain: Low Risk  (6/25/2025)    Overall Financial Resource Strain (CARDIA)     Difficulty of Paying Living Expenses: Not hard at all   Food Insecurity: No Food Insecurity (6/25/2025)    Hunger Vital Sign     Worried About Running Out of Food in the Last Year: Never true     Ran Out of Food in the Last Year: Never true   Transportation Needs: No Transportation Needs (6/25/2025)    PRAPARE - Transportation     Lack of Transportation (Medical): No     Lack of Transportation (Non-Medical): No   Physical Activity: Sufficiently Active (6/25/2025)    Exercise Vital Sign     Days of Exercise per Week: 3 days     Minutes of Exercise per Session: 60 min   Stress: No Stress Concern Present (6/25/2025)    [employer] - Occupational Stress Questionnaire     Feeling of Stress : Only a little   Housing Stability: Low Risk  (6/25/2025)    Housing Stability Vital Sign     Unable to Pay for Housing in the Last Year: No     Homeless in the Last Year: No

## 2025-07-05 ENCOUNTER — PATIENT MESSAGE (OUTPATIENT)
Dept: INTERNAL MEDICINE | Facility: CLINIC | Age: 80
End: 2025-07-05
Payer: MEDICARE

## 2025-07-05 DIAGNOSIS — R06.7 SNEEZING: ICD-10-CM

## 2025-07-05 DIAGNOSIS — R09.82 POST-NASAL DRIP: ICD-10-CM

## 2025-07-05 DIAGNOSIS — R09.89 RUNNY NOSE: ICD-10-CM

## 2025-07-07 RX ORDER — PRAVASTATIN SODIUM 40 MG/1
40 TABLET ORAL DAILY
Qty: 90 TABLET | Refills: 3 | Status: SHIPPED | OUTPATIENT
Start: 2025-07-07 | End: 2026-07-07

## 2025-07-07 RX ORDER — SOLIFENACIN SUCCINATE 10 MG/1
10 TABLET, FILM COATED ORAL EVERY MORNING
Qty: 30 TABLET | Refills: 11 | Status: SHIPPED | OUTPATIENT
Start: 2025-07-07 | End: 2026-07-07

## 2025-07-07 RX ORDER — LEVOCETIRIZINE DIHYDROCHLORIDE 5 MG/1
5 TABLET, FILM COATED ORAL NIGHTLY
Qty: 90 TABLET | OUTPATIENT
Start: 2025-07-07

## 2025-07-07 RX ORDER — LEVOCETIRIZINE DIHYDROCHLORIDE 5 MG/1
5 TABLET, FILM COATED ORAL NIGHTLY
Qty: 90 TABLET | Refills: 3 | Status: SHIPPED | OUTPATIENT
Start: 2025-07-07 | End: 2026-07-07

## 2025-07-07 NOTE — TELEPHONE ENCOUNTER
No care due was identified.  [facility] Embedded Care Due Messages. Reference number: [id number].   7/07/2025 10:59:35 AM CDT

## 2025-07-10 ENCOUNTER — LAB VISIT (OUTPATIENT)
Dept: LAB | Facility: HOSPITAL | Age: 80
End: 2025-07-10
Attending: STUDENT IN AN ORGANIZED HEALTH CARE EDUCATION/TRAINING PROGRAM
Payer: MEDICARE

## 2025-07-10 DIAGNOSIS — A31.0 MAI (MYCOBACTERIUM AVIUM-INTRACELLULARE): ICD-10-CM

## 2025-07-10 LAB
ABSOLUTE EOSINOPHIL (OHS): 0.04 K/UL
ABSOLUTE MONOCYTE (OHS): 0.28 K/UL (ref 0.3–1)
ABSOLUTE NEUTROPHIL COUNT (OHS): 2.71 K/UL (ref 1.8–7.7)
ALBUMIN SERPL BCP-MCNC: 4.2 G/DL (ref 3.5–5.2)
ALP SERPL-CCNC: 58 UNIT/L (ref 40–150)
ALT SERPL W/O P-5'-P-CCNC: 34 UNIT/L (ref 10–44)
ANION GAP (OHS): 10 MMOL/L (ref 8–16)
AST SERPL-CCNC: 24 UNIT/L (ref 11–45)
BASOPHILS # BLD AUTO: 0.04 K/UL
BASOPHILS NFR BLD AUTO: 1 %
BILIRUB SERPL-MCNC: 1 MG/DL (ref 0.1–1)
BUN SERPL-MCNC: 19 MG/DL (ref 8–23)
CALCIUM SERPL-MCNC: 8.9 MG/DL (ref 8.7–10.5)
CHLORIDE SERPL-SCNC: 99 MMOL/L (ref 95–110)
CO2 SERPL-SCNC: 26 MMOL/L (ref 23–29)
CREAT SERPL-MCNC: 0.8 MG/DL (ref 0.5–1.4)
ERYTHROCYTE [DISTWIDTH] IN BLOOD BY AUTOMATED COUNT: 12.6 % (ref 11.5–14.5)
GFR SERPLBLD CREATININE-BSD FMLA CKD-EPI: >60 ML/MIN/1.73/M2
GLUCOSE SERPL-MCNC: 203 MG/DL (ref 70–110)
HCT VFR BLD AUTO: 40.1 % (ref 37–48.5)
HGB BLD-MCNC: 13.1 GM/DL (ref 12–16)
IMM GRANULOCYTES # BLD AUTO: 0.02 K/UL (ref 0–0.04)
IMM GRANULOCYTES NFR BLD AUTO: 0.5 % (ref 0–0.5)
LYMPHOCYTES # BLD AUTO: 1.01 K/UL (ref 1–4.8)
MCH RBC QN AUTO: 32.3 PG (ref 27–31)
MCHC RBC AUTO-ENTMCNC: 32.7 G/DL (ref 32–36)
MCV RBC AUTO: 99 FL (ref 82–98)
NUCLEATED RBC (/100WBC) (OHS): 0 /100 WBC
PLATELET # BLD AUTO: 181 K/UL (ref 150–450)
PMV BLD AUTO: 12.5 FL (ref 9.2–12.9)
POTASSIUM SERPL-SCNC: 4.2 MMOL/L (ref 3.5–5.1)
PROT SERPL-MCNC: 6.3 GM/DL (ref 6–8.4)
RBC # BLD AUTO: 4.05 M/UL (ref 4–5.4)
RELATIVE EOSINOPHIL (OHS): 1 %
RELATIVE LYMPHOCYTE (OHS): 24.6 % (ref 18–48)
RELATIVE MONOCYTE (OHS): 6.8 % (ref 4–15)
RELATIVE NEUTROPHIL (OHS): 66.1 % (ref 38–73)
SODIUM SERPL-SCNC: 135 MMOL/L (ref 136–145)
WBC # BLD AUTO: 4.1 K/UL (ref 3.9–12.7)

## 2025-07-10 PROCEDURE — 80053 COMPREHEN METABOLIC PANEL: CPT

## 2025-07-10 PROCEDURE — 85025 COMPLETE CBC W/AUTO DIFF WBC: CPT

## 2025-07-10 PROCEDURE — 36415 COLL VENOUS BLD VENIPUNCTURE: CPT | Mod: PO

## 2025-07-16 ENCOUNTER — HOSPITAL ENCOUNTER (OUTPATIENT)
Dept: RADIOLOGY | Facility: HOSPITAL | Age: 80
Discharge: HOME | End: 2025-07-16
Attending: STUDENT IN AN ORGANIZED HEALTH CARE EDUCATION/TRAINING PROGRAM
Payer: MEDICARE

## 2025-07-16 DIAGNOSIS — A31.0 MAI (MYCOBACTERIUM AVIUM-INTRACELLULARE): ICD-10-CM

## 2025-07-16 PROCEDURE — 71250 CT THORAX DX C-: CPT | Mod: TC

## 2025-07-16 PROCEDURE — 71250 CT THORAX DX C-: CPT | Mod: 26,,, | Performed by: RADIOLOGY

## 2025-07-23 ENCOUNTER — OFFICE VISIT (OUTPATIENT)
Dept: INFECTIOUS DISEASES | Facility: CLINIC | Age: 80
End: 2025-07-23
Payer: MEDICARE

## 2025-07-23 VITALS
HEART RATE: 86 BPM | BODY MASS INDEX: 16.78 KG/M2 | DIASTOLIC BLOOD PRESSURE: 74 MMHG | HEIGHT: 68 IN | SYSTOLIC BLOOD PRESSURE: 148 MMHG | WEIGHT: 110.69 LBS

## 2025-07-23 DIAGNOSIS — A31.0 MAI (MYCOBACTERIUM AVIUM-INTRACELLULARE): Primary | ICD-10-CM

## 2025-07-23 DIAGNOSIS — E03.9 HYPOTHYROIDISM, UNSPECIFIED TYPE: ICD-10-CM

## 2025-07-23 DIAGNOSIS — I10 PRIMARY HYPERTENSION: ICD-10-CM

## 2025-07-23 DIAGNOSIS — J47.9 ADULT BRONCHIECTASIS: ICD-10-CM

## 2025-07-23 DIAGNOSIS — E78.49 OTHER HYPERLIPIDEMIA: ICD-10-CM

## 2025-07-23 DIAGNOSIS — E11.9 DIABETES MELLITUS TYPE 2, NONINSULIN DEPENDENT: ICD-10-CM

## 2025-07-23 PROCEDURE — 99214 OFFICE O/P EST MOD 30 MIN: CPT | Mod: S$PBB,,, | Performed by: STUDENT IN AN ORGANIZED HEALTH CARE EDUCATION/TRAINING PROGRAM

## 2025-07-23 PROCEDURE — 99214 OFFICE O/P EST MOD 30 MIN: CPT | Mod: PBBFAC | Performed by: STUDENT IN AN ORGANIZED HEALTH CARE EDUCATION/TRAINING PROGRAM

## 2025-07-23 PROCEDURE — 99999 PR PBB SHADOW E&M-EST. PATIENT-LVL IV: CPT | Mod: PBBFAC,,, | Performed by: STUDENT IN AN ORGANIZED HEALTH CARE EDUCATION/TRAINING PROGRAM

## 2025-07-23 NOTE — PROGRESS NOTES
"Infectious Disease Clinic Note    Patient Name: Nehal Jimenez  YOB: 1945    PRESENTING HISTORY       History of Present Illness:  Ms. Nehal Jimenez is a 79 y.o. female w/ significant PMHx of DM, HTN, HLD, and hypothyroidism referred by pulmonology due to positive cultures collected from right lung on 11/6/24. Underwent bronchoscopy at the time. Findings: "Scanty secretions. Lavage performed collectively sent for microbiology. 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 CT chest with tree in bud nodular disease at the time. Had been seen for COVID last year and admitted. Seen by pulmonology for follow up and decision made to perform bronch. Current symptoms: daily cough; post nasal drip after exercise. Used to cough up a lot of mucus. Discussed with patient that Acremonium is a mold that can contaminate surfaces. More concerning if she were immunocompromised. Not likely to cause infection in this case. More commonly infects skin and nails. CT findings more likely associated with MAC. Explained complexity of MAC treatment. Ethambutol has been known to cause irreversible blindness in some cases so patient needs to see eye specialist weekly to monthly. Rifampin/rifabutin with potential to raise LFT and cause GERD/GI symptoms. Azithromycin well tolerated. Due to mild disease would plan to use 3x weekly dosing for 12 months from negative sputum cultures. 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 Sees Dr Sofia with [facility]. Patient would like to discuss risks of treatment with him before deciding to pursue treatment. Also not able to produce sputum at this time. Will need to ask pulmonology for clearance regimen. Patient and  most concerned about weight loss.      2/7/25: Patient reported willingness to begin MAC therapy. Antibiotics sent to pharmacy.      2/24: Follow up. " Patient has been on thrice weekly MAC regimen for about two weeks. Tolerating. Reiterated need to see eye doctor regularly to ensure no evidence of optic neuritis. Will need sputum and blood work every 4 weeks to ensure normal CBC/LFT and sputum negative for bacteria. CT chest scheduled in July. Goal is unchanged or improving findings.      5/28: Follow up. Patient tolerating 3 drug regimen aside from acid reflux that seems to occur once she completes breakfast after taking ethambutol. Explained there are various ways to take antibiotics. Bigger concern is combining rifampin with Synthroid as Synthroid levels can be reduced. Advised she take Synthroid with ethambutol and azithromycin first thing in the morning then take rifampin at night. Encouraged to reach out if stomach upset or acid reflux continue to worsen. Also recommend she cut out all acid foods from diet. Discussed that last AFB from February finalized no growth. Needs new orders as lab system changed. Last CBC and CMP unremarkable for drug toxicity. Seeing eye doctor every 3 months. They are aware patient is on ethambutol. She currently denies vision changes. Has been unable to produce sputum. Advised her that on days she has blood work scheduled if she can somehow produce sputum to bring a sample. If unable then would not worry about it. Patient voiced understanding. Willing to complete 12 months of therapy. Tentative stop date February 2026.      7/23: Follow up. No new sputum for review.  Patient has been taking medicine little bit differently.  Takes rifampin at night and the others in the morning.  Acid reflux and neuropathy has been much better.  Encouraged her to start vitamin B6 to help with neuropathy moving forward.  Discussed CT chest results from this month.  All evidence of infection has resolved with bronchiectasis remaining.  Patient voiced understanding.  We will still plan to stop MAC treatment in February of next year.  Patient and  daughter in agreement.  Minimal cough usually worse after nebulizer therapy.  Unable to provide new sputum specimens at this time. Will use CT to guide therapy.         EXAMINATION:  CT CHEST WITHOUT CONTRAST     CLINICAL HISTORY:  Bronchiectasis, uncomplicatedRML atelectasis;     TECHNIQUE:  Axial CT images performed through the chest without intravenous contrast.     COMPARISON:  08/04/2024     FINDINGS:  Progression of the tree-in-bud micronodular opacities in the upper lobes.  The micro nodular opacities with bronchiectasis and right middle lobe atelectasis is otherwise stable in the lower lobes, lingula, and right middle lobe.  No effusions.  No thoracic adenopathy.     The upper abdominal visualized organs are within normal limits.     The bones are intact.     Impression:     Progression of the tree-in-bud micronodular opacities specifically in the upper lobes characteristic of an indolent infectious bronchiolitis such as DORI.     All CT scans at this facility are performed  using dose modulation techniques as appropriate to performed exam including the following:  automated exposure control; adjustment of mA and/or kV according to the patients size (this includes techniques or standardized protocols for targeted exams where dose is matched to indication/reason for exam: i.e. extremities or head);  iterative reconstruction technique.        Electronically signed by:Amadeo Avila MD  Date:                                            10/28/2024  Time:                                           10:53                Exam Ended: 10/28/24 10:27 CDT Last Resulted: 10/28/24 10:53 CDT            Procedure Component Value Units Date/Time   Culture, Respiratory with Gram Stain [[id number]] Collected: 11/06/24 0828   Order Status: Canceled Specimen: Respiratory from Lung, RML     AFB Culture & Smear [[id number]] (Abnormal) Collected: 11/06/24 0828   Order Status: Completed Specimen: Respiratory from Lung, RML Updated:  12/11/24 1245     AFB Culture & Smear Culture positive, identification pending       Results acknowledged via secure chat by Dr. Brian Aldrich       11/20/2024  11:41       MYCOBACTERIUM SPECIES  Identification and susceptibility pending   Abnormal      AFB CULTURE STAIN No acid fast bacilli seen.   Fungus culture [[id number]] (Abnormal) Collected: 11/06/24 0828   Order Status: Completed Specimen: Respiratory from Lung, RML Updated: 12/16/24 0529     Fungus (Mycology) Culture ACREMONIUM SPECIES  Rare   Abnormal    KOH prep [[id number]] Collected: 11/06/24 0828   Order Status: Completed Specimen: Respiratory from Lung, RML Updated: 11/07/24 0133     KOH Prep No yeast or fungal elements seen   Culture Referred ID, Mycobacteria and Nocardia [[id number]] (Abnormal) Collected: 11/06/24 0828   Order Status: Completed Updated: 12/30/24 1410     Specimen and source - Mycobacterium RESP, Lung, RML     Mycobacteria culture ID + Susc, referred FINAL 12/30/2024 1408 Abnormal      Comment: SOURCE: LUNG, lung RML  MYCOBACTERIA CULTURE REFER ID+SUSC                     FINAL  MYCOBACTERIUM AVIUM COMPLEX    -  Per CLSI M24 Guidelines, LIZY data for ethambutol,  rifampin,and rifabutin have shown poor correlation  with clinical response. Therefore, although these  drugs are in the recommended treatment regimen,  breakpoints for these agents that separate  susceptible from resistant strains cannot be  determined and these drugs will not be reported.  A high or a low LIZY value provided by a laboratory  should not be used to determine whether these  drugs are useful in treatment regimens and we will  not test forward to other institutions for testing  of these agents because it is contrary to current  guidance-based therapy recommendations.  There are no established interpretive guidelines for agents  reported without interpretations.  Previous comment was modified at 15:03 on 12/16/2024:  Negative for M. tuberculosis complex to  date  by PCR.  ----------------------------------------------------------  Organism     MYCOBACTERIUM AVIUM COMPLEX  Antibiotic                   LIZY (mcg/mL)  Interpretation  ----------------------------------------------------------  Clofazimine                           0.5  Moxifloxacin                           >4       R  Clarithromycin                          2       S  Amikacin (IV)                          64       R  Amikacin (liposomal, inhaled)          64       S  Linezolid                              16       I  Moxifloxacin: The in vivo effectiveness of this agent for  MAC disease is unproven.  Clarithromycin: Clarithromycin is the class drug for  macrolides and the only macrolide that needs to be tested.  Linezolid: The in vivo effectiveness of this agent for MAC  disease is unproven.  ------------------------------------------------------------  S=SUSCEPTIBLE  I=INTERMEDIATE  R=RESISTANT  NS=NONSUSCEPTIBLE  SDD=SUSCEPTIBLE DOSE DEPENDENT  ------------------------------------------------------------    Test Performed by:  [facility] [address] [address]  : Uziel Vick Ph.D.; CLIA# [id number]      Culture, Respiratory with Gram Stain [[id number]] Collected: 11/06/24 0826   Order Status: Completed Specimen: Bronchial Wash Updated: 11/09/24 1123     Respiratory Culture No growth     Gram Stain (Respiratory) <10 epithelial cells per low power field.     Gram Stain (Respiratory) Rare WBC's     Gram Stain (Respiratory) No organisms seen   AFB Culture & Smear [[id number]] Collected: 11/06/24 0826   Order Status: Completed Specimen: Bronchial Wash Updated: 12/04/24 1218     AFB Culture & Smear Culture positive, identification pending       Verbal report given for previous positive culture     AFB CULTURE STAIN No acid fast bacilli seen.   Fungus culture [[id number]] Collected: 11/06/24 0826   Order Status: Completed Specimen:  Bronchial Wash Updated: 12/09/24 1001     Fungus (Mycology) Culture No fungus isolated after 4 weeks   KOH prep [[id number]] Collected: 11/06/24 0826   Order Status: Completed Specimen: Bronchial Wash Updated: 11/07/24 0148     KOH Prep No yeast or fungal elements seen   Culture Referred for Identification, Mycobacterium [[id number]] (Abnormal) Collected: 11/06/24 0826   Order Status: Completed Updated: 12/23/24 1417     Culture referred for ID, Mycobacterium FINAL 12/23/2024 1415 Abnormal      Comment: SOURCE: BRONCHIAL WASHING, Bronchial Wash  CULTURE REFER FOR ID, MYCOBACTERIUM                    FINAL  MYCOBACTERIUM AVIUM COMPLEX    -  Previous comment was modified at 14:15 on 12/23/2024:  Negative for M. tuberculosis complex to date  by PCR.    Test Performed by:  [place] [address]  : Uziel Vick Ph.D.; CLIA# [id number]            Review of Systems:  Constitutional: no fever or chills  Respiratory: minimal cough and shortness of breath  Cardiovascular: no chest pain  Gastrointestinal: no nausea or vomiting, no abdominal pain, no constipation, no diarrhea; some acid reflux  Genitourinary: no hematuria or dysuria  Musculoskeletal: no arthralgias or myalgias  Skin: no rash  Neurological: no headaches, numbness, or paresthesias    The following portions of the patient's history were reviewed and updated as appropriate: allergies, current medications, past family history, past medical history, past social history, past surgical history, and problem list.    PAST HISTORY:     Immunization History   Administered Date(s) Administered    COVID-19, MRNA, LN-S, PF (Pfizer) (Gray Cap) 07/06/2022    COVID-19, MRNA, LN-S, PF (Pfizer) (Purple Cap) 01/13/2021, 02/03/2021, 10/04/2021    COVID-19, mRNA, LNP-S, PF, adniella-sucrose, 30 mcg/0.3 mL (Pfizer Ages 12+) 09/28/2024       Past Medical History:   Diagnosis Date    Diabetes mellitus      Hypercholesteremia     Hypertension     Hypothyroidism     Osteopenia     Endocrinologist treating    Pneumonia        Past Surgical History:   Procedure Laterality Date    BREAST BIOPSY      BREAST CYST ASPIRATION      BRONCHOSCOPY Bilateral 11/6/2024    Procedure: Bronchoscopy - insert lighted tube into airway to take a biopsy of lung;  Surgeon: Brian Aldrich MD;  [facility]: [facility];  Service: Pulmonary;  Laterality: Bilateral;    COLONOSCOPY  2016    [facility]    ENDOSCOPY         Family History   Problem Relation Name Age of Onset    Colon cancer Father  86    Hodgkin's lymphoma Father         Social History     Socioeconomic History    Marital status:    Tobacco Use    Smoking status: Never    Smokeless tobacco: Never   Vaping Use    Vaping status: Never Used   Substance and Sexual Activity    Alcohol use: Never    Drug use: Never    Sexual activity: Not Currently     Partners: Male     Birth control/protection: None     Social Drivers of Health     Financial Resource Strain: Low Risk  (6/25/2025)    Overall Financial Resource Strain (CARDIA)     Difficulty of Paying Living Expenses: Not hard at all   Food Insecurity: No Food Insecurity (6/25/2025)    Hunger Vital Sign     Worried About Running Out of Food in the Last Year: Never true     Ran Out of Food in the Last Year: Never true   Transportation Needs: No Transportation Needs (6/25/2025)    PRAPARE - Transportation     Lack of Transportation (Medical): No     Lack of Transportation (Non-Medical): No   Physical Activity: Sufficiently Active (6/25/2025)    Exercise Vital Sign     Days of Exercise per Week: 3 days     Minutes of Exercise per Session: 60 min   Stress: No Stress Concern Present (6/25/2025)    [employer] [place] [employer] - Occupational Stress Questionnaire     Feeling of Stress : Only a little   Housing Stability: Low Risk  (6/25/2025)    Housing Stability Vital Sign     Unable to Pay for Housing in the Last Year: No      Homeless in the Last Year: No       MEDICATIONS & ALLERGIES:     Current Outpatient Medications on File Prior to Visit   Medication Sig    ACCU-CHEK GUIDE GLUCOSE METER Misc Use to test blood sugar once daily    azelastine (ASTELIN) 137 mcg (0.1 %) nasal spray 1 spray (137 mcg total) by Nasal route 2 (two) times daily.    azithromycin (ZITHROMAX) 500 MG tablet Take 1 tablet (500 mg total) by mouth 3 (three) times a week.    calcium citrate-vitamin D3 315-200 mg (CITRACAL+D) 315 mg-5 mcg (200 unit) per tablet Take 1 tablet by mouth 2 (two) times daily.    denosumab (PROLIA) 60 mg/mL Syrg Inject 60 mg into the skin. Patient takes every 6 months. Next due on 11/14/2024.    ethambutoL (MYAMBUTOL) 400 MG Tab Take 3 tablets (1,200 mg total) by mouth 3 (three) times a week.    lancets 33 gauge Misc USE EVERY DAY    levocetirizine (XYZAL) 5 MG tablet Take 1 tablet (5 mg total) by mouth every evening.    levothyroxine (SYNTHROID) 125 MCG tablet Take by mouth.    losartan (COZAAR) 25 MG tablet losartan 25 mg tablet   TAKE 1 TABLET BY MOUTH DAILY    metFORMIN (GLUCOPHAGE-XR) 500 MG ER 24hr tablet Take 500 mg by mouth once daily.    mucus clearing device (ACAPELLA, FLUTTER) by Misc.(Non-Drug; Combo Route) route every 12 (twelve) hours.    mucus clearing device (AEROBIKA OSCILLATING PEP SYSTM) by Misc.(Non-Drug; Combo Route) route every 12 (twelve) hours.    ONETOUCH VERIO TEST STRIPS Strp SMARTSIG:Via Meter Daily    pravastatin (PRAVACHOL) 40 MG tablet Take 1 tablet (40 mg total) by mouth once daily.    pulse oximeter (PULSE OXIMETER) device by Apply Externally route 2 (two) times a day. Use twice daily at 8 AM and 3 PM and record the value in [facility] as directed.    rifabutin (MYCOBUTIN) 150 mg Cap Take 2 capsules (300 mg total) by mouth 3 (three) times a week.    sodium chloride 7% 7 % nebulizer solution Take 4 mLs by nebulization every 12 (twelve) hours.    solifenacin (VESICARE) 10 MG tablet Take 1 tablet (10 mg total) by  "mouth every morning.    triamcinolone acetonide 0.1% (KENALOG) 0.1 % cream      No current facility-administered medications on file prior to visit.       Review of patient's allergies indicates:   Allergen Reactions    Paxlovid [nirmatrelvir-ritonavir] Swelling    Adhesive tape-silicones     Latex, natural rubber     Albuterol Anxiety, Other (See Comments) and Palpitations       OBJECTIVE:   Vital Signs:  Vitals:    07/23/25 1016   BP: (!) 148/74   Pulse: 86   Weight: 50.2 kg (110 lb 10.7 oz)   Height: 5' 8" (1.727 m)       No results found for this or any previous visit (from the past 24 hours).      Physical Exam:   General:  Well developed, well nourished, no acute distress  HEENT:  Normocephalic, atraumatic  CVS:  RRR, S1 and S2 normal, no murmurs, rubs, gallops  Resp:  Lungs clear to auscultation, no wheezes, rales, rhonchi  GI:  Abdomen soft, non-tender, non-distended, normoactive bowel sounds, no masses  MSK:  No muscle atrophy, peripheral edema, full range of motion  Skin:  No rashes, ulcers, erythema  Psych:  Alert and oriented to person, place, and time    ASSESSMENT:     Pulmonary MAC  Using as guide 2020-Treatment of Nontuberculous Mycobacterial Pulmonary Disease: An Official ATS/ERS/ESCMID/IDSA Clinical Practice Guideline: Executive Summary -  Therapy needs combination of clinical, microbiologic and radiologic therapy .  Clinical and radiologic criteria (all required) 1. Pulmonary or systemic symptoms and 2. Nodular or cavitary opacities on chest radiograph or bronchiectasis with multiple small nodules on high-resolution computed tomography and 3. Appropriate exclusion of other diagnoses        Clinical and microbiological criteria for diagnosing non-tuberculous mycobacterial lung disease (wendy et al )    Clinical ( Both required)   1.  Pulmonary symptoms, nodular or cavitary opacities on chest radiograph, or a high-resolution CT scan that shows multifocal bronchiectasis with multiple small " nodules.    2.  Appropriate exclusion of other diagnoses.   Microbiological ( One required)   1. Positive culture results from at least two separate expectorated sputum samples; if the results are nondiagnostic,consider repeat sputum AFB smears and cultures.   2. Positive culture results from at least one bronchial wash or Lavage   3. Transbronchial or other lung biopsy with mycobacterial histopathological features (granulomatous inflammation or AFB) and positive culture for NTM or biopsy showing mycobacterial histopathological features (granulomatous inflammation or AFB) and one or more sputum or bronchial washings that are culture-positive for NTM.      --AFB + 11/6 from BAL   --Nodular disease noted on recent CT imaging   --Would consider this mild disease   --At this time patient not reporting classic symptoms; difficult to cough up sputum for surveillance which would make treatment challenging   --Otherwise meets criteria for thrice weekly treatment   --Patient opted to begin treatment; antibiotics sent to [facility] on file Feb 7th   --Continue PO azithromycin 500 mg 3x weekly, rifampin 600 mg 3x weekly, and ethambutol 25 mg/kg (1200 mg) 3x weekly   --Discussed possible side effects including vision changes/blindness from ethambutol, elevated LFT from rifampin, and prolonged QT from azithromycin (less likely as patient not on other QT prolonging agents)   --Advised patient to reach out if GERD/weight loss persists; if so will switch rifampin to rifabutin   --Monthly CBC and CMP while on treatment   --Patient will need monthly vision testing while on ethambutol; eye doctor has opted for every 3 month follow up   --Plan to collect AFB sputum culture every 4 weeks when able to produce a sample   --Clinical expectations: symptomatic and radiographic improvement within 2-6 months and conversion of sputum culture from positive to negative within approximately 6 months   --Will plan to treat for at least 12 months  after sputum culture becomes negative for MAC; stop date Feb 2026  --Recommend adjunctive bronchial hygiene with 7% nebulizer and Aerobika via pulmonology especially since patient struggling to produce sputum   --Will check CT chest in January 2026    --Follow up with me in 3 months      DM  Continue current medications and follow up with PCP       HTN  Continue current medications and follow up with PCP       HLD  Continue current medications and follow up with PCP     Hypothyroidism   Continue current medications and follow up with PCP      PLAN:     Nehal was seen today for femi (mycobacterium avium-intracellulare).    Diagnoses and all orders for this visit:    FEMI (mycobacterium avium-intracellulare)  -     CT Chest Without Contrast; Future    Adult bronchiectasis    Primary hypertension    Other hyperlipidemia    Hypothyroidism, unspecified type    Diabetes mellitus type 2, noninsulin dependent          The total time for evaluation and management services performed on 7/23/25 was greater than 30 minutes.        Anand Jewell, DO   Infectious Diseases

## 2025-08-01 ENCOUNTER — OFFICE VISIT (OUTPATIENT)
Dept: INTERNAL MEDICINE | Facility: CLINIC | Age: 80
End: 2025-08-01
Payer: MEDICARE

## 2025-08-01 VITALS
HEIGHT: 68 IN | WEIGHT: 111.31 LBS | TEMPERATURE: 97 F | OXYGEN SATURATION: 94 % | DIASTOLIC BLOOD PRESSURE: 62 MMHG | BODY MASS INDEX: 16.87 KG/M2 | SYSTOLIC BLOOD PRESSURE: 110 MMHG | HEART RATE: 94 BPM

## 2025-08-01 DIAGNOSIS — R09.82 POST-NASAL DRIP: ICD-10-CM

## 2025-08-01 DIAGNOSIS — S93.401D SPRAIN OF RIGHT ANKLE, UNSPECIFIED LIGAMENT, SUBSEQUENT ENCOUNTER: Primary | ICD-10-CM

## 2025-08-01 DIAGNOSIS — E78.5 HYPERLIPIDEMIA, UNSPECIFIED HYPERLIPIDEMIA TYPE: ICD-10-CM

## 2025-08-01 PROCEDURE — 99215 OFFICE O/P EST HI 40 MIN: CPT | Mod: PBBFAC,PO | Performed by: FAMILY MEDICINE

## 2025-08-01 PROCEDURE — 99999 PR PBB SHADOW E&M-EST. PATIENT-LVL V: CPT | Mod: PBBFAC,,, | Performed by: FAMILY MEDICINE

## 2025-08-01 PROCEDURE — G2211 COMPLEX E/M VISIT ADD ON: HCPCS | Mod: ,,, | Performed by: FAMILY MEDICINE

## 2025-08-01 PROCEDURE — 99214 OFFICE O/P EST MOD 30 MIN: CPT | Mod: S$PBB,,, | Performed by: FAMILY MEDICINE

## 2025-08-01 RX ORDER — LANOLIN ALCOHOL/MO/W.PET/CERES
50 CREAM (GRAM) TOPICAL DAILY
COMMUNITY

## 2025-08-02 NOTE — PROGRESS NOTES
"Subjective:      Patient ID: Nehal Jimenez is a 80 y.o. female.    Chief Complaint: Medication Management, Ear Fullness (Check fluid in ears), and Foot Problem (Recheck for ankle sprain)      History of Present Illness    CHIEF COMPLAINT:  Ms. Jimenez presents today with her daughter for follow-up of foot injury and multiple concerns.    FOOT INJURY:  She sustained a foot injury on June 20th and currently experiences mild persistent swelling over the affected area with intermittent and minimal pain. She was initially treated with a medical boot and compression wrap. She occasionally elevates the leg and sometimes uses compression wrapping. She notes significant improvement from initial injury, with swelling markedly reduced compared to first few weeks post-injury. She denies significant ongoing pain and reports continued gradual healing.    ENT SYMPTOMS:  She reports ear pressure associated with hearing aid use, described as feeling "stopped up" without pain. She notes the hearing aid may be causing some air suction sensation. She also experiences hoarseness for approximately one week, which is worse in the mornings. She has clear nasal discharge when blowing her nose, which is not discolored. She denies ear pain, ear infection, or significant allergy symptoms.    WEIGHT MANAGEMENT:  Recent weights include 109 lbs on June 20th, 110 lbs on July 23rd, and 111 lbs at current visit. She maintains relatively stable weight with minor fluctuations.    MEDICAL HISTORY:  She has diabetes that is well-managed through diet. She has a history of COVID-19 with hospitalization in August of last year, during which elevated troponin levels were noted. She was hospitalized for three days with lung-related complications initially thought to be pneumonia. She experienced a second COVID-19 infection in December, at which time troponin levels were normal.    CURRENT MEDICATIONS:  She takes Pravastatin 40 mg for cholesterol management " "due to diabetes-related cardiovascular risk. Her Levothyroxine dose was recently increased in May following elevated TSH levels. She takes Zyrtec daily for allergy management. She reports no adverse reactions or side effects from current medication regimen.        Past Medical History:   Diagnosis Date    Diabetes mellitus     Hypercholesteremia     Hypertension     Hypothyroidism     Osteopenia     Endocrinologist treating    Pneumonia           Past Surgical History:   Procedure Laterality Date    BREAST BIOPSY      BREAST CYST ASPIRATION      BRONCHOSCOPY Bilateral 11/6/2024    Procedure: Bronchoscopy - insert lighted tube into airway to take a biopsy of lung;  Surgeon: Brian Aldrich MD;  Location: [facility];  Service: Pulmonary;  Laterality: Bilateral;    COLONOSCOPY  2016    [facility]    ENDOSCOPY       Family History   Problem Relation Name Age of Onset    Colon cancer Father  86    Hodgkin's lymphoma Father       Social History[1]  Review of patient's allergies indicates:   Allergen Reactions    Paxlovid [nirmatrelvir-ritonavir] Swelling    Adhesive tape-silicones     Latex, natural rubber     Albuterol Anxiety, Other (See Comments) and Palpitations       Review of Systems   HENT:  Positive for hearing loss and sore throat (PND, hoarseness).    Eyes:  Negative for discharge.   Respiratory:  Negative for wheezing.    Cardiovascular:  Negative for chest pain and palpitations.   Gastrointestinal:  Negative for constipation, diarrhea and vomiting.   Genitourinary:  Negative for dysuria and hematuria.   Musculoskeletal:  Negative for neck pain.   Neurological:  Negative for weakness and headaches.   Endo/Heme/Allergies:  Negative for polydipsia.     Objective:       /62 (BP Location: Right arm, Patient Position: Sitting)   Pulse 94   Temp 97.4 °F (36.3 °C) (Tympanic)   Ht 5' 8" (1.727 m)   Wt 50.5 kg (111 lb 5.3 oz)   SpO2 (!) 94%   BMI 16.93 kg/m²   Physical Exam    Musculoskeletal: Mild swelling " in foot. Mild tenderness along foot.  Vitals: Weight: 111 lbs.  Ears: All normal.        Physical Exam  Vitals reviewed.   Constitutional:       General: She is not in acute distress.     Appearance: Normal appearance. She is well-developed. She is not diaphoretic.   HENT:      Head: Normocephalic.      Right Ear: Hearing, ear canal and external ear normal. Tympanic membrane is bulging.      Left Ear: Hearing, ear canal and external ear normal. Tympanic membrane is bulging.      Nose: Mucosal edema present.      Right Sinus: No maxillary sinus tenderness or frontal sinus tenderness.      Left Sinus: No maxillary sinus tenderness or frontal sinus tenderness.      Mouth/Throat:      Pharynx: Uvula midline.   Eyes:      Conjunctiva/sclera: Conjunctivae normal.      Pupils: Pupils are equal, round, and reactive to light.   Neck:      Thyroid: No thyromegaly.      Trachea: No tracheal deviation.   Cardiovascular:      Rate and Rhythm: Normal rate and regular rhythm.      Heart sounds: Normal heart sounds.   Pulmonary:      Effort: Pulmonary effort is normal. No respiratory distress.      Breath sounds: Normal breath sounds.   Abdominal:      General: Bowel sounds are normal.      Palpations: Abdomen is soft.   Musculoskeletal:      Cervical back: Normal range of motion and neck supple.      Comments: Mild swelling noted lateral right ankle, no tenderness to palpation, normal pedal pulses, normal ROM   Lymphadenopathy:      Cervical: No cervical adenopathy.   Skin:     General: Skin is warm and dry.   Neurological:      Mental Status: She is alert.   Psychiatric:         Behavior: Behavior normal.         Assessment:     1. Sprain of right ankle, unspecified ligament, subsequent encounter    2. Post-nasal drip    3. Hyperlipidemia, unspecified hyperlipidemia type      Plan:   Assessment & Plan    MEDICAL DECISION MAKING:  - Foot sprain improving but continued swelling at 6 weeks post-injury.  - Ear complaints: no fluid or  "infection present.  - Hoarseness potentially related to recent increased exposure to outdoor allergens.  - Evaluated recent thyroid medication increase by Dr. Olson as potential cause of weight loss.  - Considered strategies to address low weight, including protein supplements and appetite stimulants.    PATIENT EDUCATION:  - Reviewed cholesterol management, noting diabetes as a risk factor similar to heart disease.  - Explained that diabetes increases inflammation, accelerating arterial blockage formation and increasing risk of heart attack, stroke, and kidney damage.  - Discussed standard of care for LDL cholesterol targets in diabetic patients (currently <70, potentially changing to <55).  - Explained that LDL is the "bad" cholesterol that causes blockages, while HDL is generally considered "good".  - Explained that thyroid medication increase can speed metabolism and cause weight loss.    ACTION ITEMS/LIFESTYLE:  - MsErick Jimenez to elevate foot to help reduce swelling.  - Ms. Tony to use compression wrap on foot if it does not cause balance issues.  - Ms. Tony to monitor weight weekly using home scale and keep a log.  - Ms. Tony to consume 1 protein drink daily in addition to regular meals.    MEDICATIONS:  - Continue Pravastatin 40 mg for cholesterol management.  - Continue Zyrtec (cetirizine) and nasal spray for allergy management.    FOLLOW UP:  - Contact the office if hoarseness persists for a few more weeks to consider ENT referral.        Sprain of right ankle, unspecified ligament, subsequent encounter    Post-nasal drip    Hyperlipidemia, unspecified hyperlipidemia type    32 minutes of total time spent on the encounter, which includes face to face time and non-face to face time preparing to see the patient (eg, review of tests), Obtaining and/or reviewing separately obtained history, Documenting clinical information in the electronic or other health record, Independently interpreting results (not " separately reported) and communicating results to the patient/family/caregiver, or Care coordination (not separately reported).     Medication List with Changes/Refills   Current Medications    ACCU-CHEK GUIDE GLUCOSE METER MISC    Use to test blood sugar once daily    AZELASTINE (ASTELIN) 137 MCG (0.1 %) NASAL SPRAY    1 spray (137 mcg total) by Nasal route 2 (two) times daily.    AZITHROMYCIN (ZITHROMAX) 500 MG TABLET    Take 1 tablet (500 mg total) by mouth 3 (three) times a week.    CALCIUM CITRATE-VITAMIN D3 315-200 MG (CITRACAL+D) 315 MG-5 MCG (200 UNIT) PER TABLET    Take 1 tablet by mouth 2 (two) times daily.    DENOSUMAB (PROLIA) 60 MG/ML SYRG    Inject 60 mg into the skin. Patient takes every 6 months. Next due on 11/14/2024.    ETHAMBUTOL (MYAMBUTOL) 400 MG TAB    Take 3 tablets (1,200 mg total) by mouth 3 (three) times a week.    LANCETS 33 GAUGE MISC    USE EVERY DAY    LEVOCETIRIZINE (XYZAL) 5 MG TABLET    Take 1 tablet (5 mg total) by mouth every evening.    LEVOTHYROXINE (SYNTHROID) 125 MCG TABLET    Take by mouth.    LOSARTAN (COZAAR) 25 MG TABLET    losartan 25 mg tablet   TAKE 1 TABLET BY MOUTH DAILY    METFORMIN (GLUCOPHAGE-XR) 500 MG ER 24HR TABLET    Take 500 mg by mouth once daily.    MUCUS CLEARING DEVICE (ACAPELLA, FLUTTER)    by Misc.(Non-Drug; Combo Route) route every 12 (twelve) hours.    MUCUS CLEARING DEVICE (AEROBIKA OSCILLATING PEP SYSTM)    by Misc.(Non-Drug; Combo Route) route every 12 (twelve) hours.    ONETOUCH VERIO TEST STRIPS STRP    SMARTSIG:Via Meter Daily    PRAVASTATIN (PRAVACHOL) 40 MG TABLET    Take 1 tablet (40 mg total) by mouth once daily.    PULSE OXIMETER (PULSE OXIMETER) DEVICE    by Apply Externally route 2 (two) times a day. Use twice daily at 8 AM and 3 PM and record the value in [employer] as directed.    PYRIDOXINE, VITAMIN B6, (VITAMIN B-6) 50 MG TAB    Take 50 mg by mouth once daily. OTC    RIFABUTIN (MYCOBUTIN) 150 MG CAP    Take 2 capsules (300 mg total) by  mouth 3 (three) times a week.    SODIUM CHLORIDE 7% 7 % NEBULIZER SOLUTION    Take 4 mLs by nebulization every 12 (twelve) hours.    SOLIFENACIN (VESICARE) 10 MG TABLET    Take 1 tablet (10 mg total) by mouth every morning.    TRIAMCINOLONE ACETONIDE 0.1% (KENALOG) 0.1 % CREAM           This note was generated with the assistance of ambient listening technology. Verbal consent was obtained by the patient and accompanying visitor(s) for the recording of patient appointment to facilitate this note. I attest to having reviewed and edited the generated note for accuracy, though some syntax or spelling errors may persist. Please contact the author of this note for any clarification.            [1]   Social History  Socioeconomic History    Marital status:    Tobacco Use    Smoking status: Never    Smokeless tobacco: Never   Vaping Use    Vaping status: Never Used   Substance and Sexual Activity    Alcohol use: Never    Drug use: Never    Sexual activity: Not Currently     Partners: Male     Birth control/protection: None     Social Drivers of Health     Financial Resource Strain: Low Risk  (6/25/2025)    Overall Financial Resource Strain (CARDIA)     Difficulty of Paying Living Expenses: Not hard at all   Food Insecurity: No Food Insecurity (6/25/2025)    Hunger Vital Sign     Worried About Running Out of Food in the Last Year: Never true     Ran Out of Food in the Last Year: Never true   Transportation Needs: No Transportation Needs (6/25/2025)    PRAPARE - Transportation     Lack of Transportation (Medical): No     Lack of Transportation (Non-Medical): No   Physical Activity: Sufficiently Active (6/25/2025)    Exercise Vital Sign     Days of Exercise per Week: 3 days     Minutes of Exercise per Session: 60 min   Stress: No Stress Concern Present (6/25/2025)    [employer] - Occupational Stress Questionnaire     Feeling of Stress : Only a little   Housing Stability: Low Risk   (6/25/2025)    Housing Stability Vital Sign     Unable to Pay for Housing in the Last Year: No     Homeless in the Last Year: No

## 2025-08-08 ENCOUNTER — LAB VISIT (OUTPATIENT)
Dept: LAB | Facility: HOSPITAL | Age: 80
End: 2025-08-08
Attending: STUDENT IN AN ORGANIZED HEALTH CARE EDUCATION/TRAINING PROGRAM
Payer: MEDICARE

## 2025-08-08 DIAGNOSIS — A31.0 MAI (MYCOBACTERIUM AVIUM-INTRACELLULARE): ICD-10-CM

## 2025-08-08 LAB
ABSOLUTE EOSINOPHIL (OHS): 0.08 K/UL
ABSOLUTE MONOCYTE (OHS): 0.24 K/UL (ref 0.3–1)
ABSOLUTE NEUTROPHIL COUNT (OHS): 2.66 K/UL (ref 1.8–7.7)
ALBUMIN SERPL BCP-MCNC: 3.9 G/DL (ref 3.5–5.2)
ALP SERPL-CCNC: 71 UNIT/L (ref 40–150)
ALT SERPL W/O P-5'-P-CCNC: 49 UNIT/L (ref 0–55)
ANION GAP (OHS): 6 MMOL/L (ref 8–16)
AST SERPL-CCNC: 32 UNIT/L (ref 0–50)
BASOPHILS # BLD AUTO: 0.04 K/UL
BASOPHILS NFR BLD AUTO: 1 %
BILIRUB SERPL-MCNC: 0.9 MG/DL (ref 0.1–1)
BUN SERPL-MCNC: 19 MG/DL (ref 8–23)
CALCIUM SERPL-MCNC: 9.1 MG/DL (ref 8.7–10.5)
CHLORIDE SERPL-SCNC: 101 MMOL/L (ref 95–110)
CO2 SERPL-SCNC: 28 MMOL/L (ref 23–29)
CREAT SERPL-MCNC: 0.8 MG/DL (ref 0.5–1.4)
ERYTHROCYTE [DISTWIDTH] IN BLOOD BY AUTOMATED COUNT: 12.3 % (ref 11.5–14.5)
GFR SERPLBLD CREATININE-BSD FMLA CKD-EPI: >60 ML/MIN/1.73/M2
GLUCOSE SERPL-MCNC: 272 MG/DL (ref 70–110)
HCT VFR BLD AUTO: 42 % (ref 37–48.5)
HGB BLD-MCNC: 14 GM/DL (ref 12–16)
IMM GRANULOCYTES # BLD AUTO: 0.01 K/UL (ref 0–0.04)
IMM GRANULOCYTES NFR BLD AUTO: 0.2 % (ref 0–0.5)
LYMPHOCYTES # BLD AUTO: 1.06 K/UL (ref 1–4.8)
MCH RBC QN AUTO: 33.5 PG (ref 27–31)
MCHC RBC AUTO-ENTMCNC: 33.3 G/DL (ref 32–36)
MCV RBC AUTO: 101 FL (ref 82–98)
NUCLEATED RBC (/100WBC) (OHS): 0 /100 WBC
PLATELET # BLD AUTO: 179 K/UL (ref 150–450)
PMV BLD AUTO: 12.9 FL (ref 9.2–12.9)
POTASSIUM SERPL-SCNC: 4.4 MMOL/L (ref 3.5–5.1)
PROT SERPL-MCNC: 6.1 GM/DL (ref 6–8.4)
RBC # BLD AUTO: 4.18 M/UL (ref 4–5.4)
RELATIVE EOSINOPHIL (OHS): 2 %
RELATIVE LYMPHOCYTE (OHS): 25.9 % (ref 18–48)
RELATIVE MONOCYTE (OHS): 5.9 % (ref 4–15)
RELATIVE NEUTROPHIL (OHS): 65 % (ref 38–73)
SODIUM SERPL-SCNC: 135 MMOL/L (ref 136–145)
WBC # BLD AUTO: 4.09 K/UL (ref 3.9–12.7)

## 2025-08-08 PROCEDURE — 80053 COMPREHEN METABOLIC PANEL: CPT

## 2025-08-08 PROCEDURE — 85025 COMPLETE CBC W/AUTO DIFF WBC: CPT

## 2025-08-08 PROCEDURE — 36415 COLL VENOUS BLD VENIPUNCTURE: CPT | Mod: PO

## 2025-08-18 ENCOUNTER — TELEPHONE (OUTPATIENT)
Dept: OTOLARYNGOLOGY | Facility: CLINIC | Age: 80
End: 2025-08-18
Payer: MEDICARE

## 2025-08-21 ENCOUNTER — OFFICE VISIT (OUTPATIENT)
Dept: OTOLARYNGOLOGY | Facility: CLINIC | Age: 80
End: 2025-08-21
Payer: MEDICARE

## 2025-08-21 VITALS — BODY MASS INDEX: 16.87 KG/M2 | WEIGHT: 111.31 LBS | HEIGHT: 68 IN

## 2025-08-21 DIAGNOSIS — J30.89 NON-SEASONAL ALLERGIC RHINITIS, UNSPECIFIED TRIGGER: ICD-10-CM

## 2025-08-21 DIAGNOSIS — A31.0 PULMONARY MYCOBACTERIAL INFECTION: ICD-10-CM

## 2025-08-21 DIAGNOSIS — R05.3 CHRONIC COUGH: ICD-10-CM

## 2025-08-21 DIAGNOSIS — J38.7 PRESBYLARYNX: ICD-10-CM

## 2025-08-21 DIAGNOSIS — K21.9 LARYNGOPHARYNGEAL REFLUX (LPR): Primary | ICD-10-CM

## 2025-08-21 DIAGNOSIS — R49.0 DYSPHONIA: ICD-10-CM

## 2025-08-21 PROCEDURE — 99999 PR PBB SHADOW E&M-EST. PATIENT-LVL IV: CPT | Mod: PBBFAC,,, | Performed by: OTOLARYNGOLOGY

## 2025-08-21 PROCEDURE — 99214 OFFICE O/P EST MOD 30 MIN: CPT | Mod: PBBFAC,25 | Performed by: OTOLARYNGOLOGY

## 2025-08-21 PROCEDURE — 99204 OFFICE O/P NEW MOD 45 MIN: CPT | Mod: 25,S$PBB,, | Performed by: OTOLARYNGOLOGY

## 2025-08-21 PROCEDURE — 31575 DIAGNOSTIC LARYNGOSCOPY: CPT | Mod: PBBFAC | Performed by: OTOLARYNGOLOGY

## 2025-08-21 PROCEDURE — 31575 DIAGNOSTIC LARYNGOSCOPY: CPT | Mod: S$PBB,,, | Performed by: OTOLARYNGOLOGY

## 2025-08-21 RX ORDER — FAMOTIDINE 20 MG/1
20 TABLET, FILM COATED ORAL 2 TIMES DAILY
Qty: 60 TABLET | Refills: 11 | Status: SHIPPED | OUTPATIENT
Start: 2025-08-21 | End: 2026-08-21

## 2025-08-22 ENCOUNTER — PATIENT MESSAGE (OUTPATIENT)
Dept: INFECTIOUS DISEASES | Facility: CLINIC | Age: 80
End: 2025-08-22
Payer: MEDICARE